# Patient Record
Sex: FEMALE | Race: WHITE | NOT HISPANIC OR LATINO | URBAN - METROPOLITAN AREA
[De-identification: names, ages, dates, MRNs, and addresses within clinical notes are randomized per-mention and may not be internally consistent; named-entity substitution may affect disease eponyms.]

---

## 2022-10-29 ENCOUNTER — INPATIENT (INPATIENT)
Facility: HOSPITAL | Age: 77
LOS: 10 days | Discharge: HOME | DRG: 439 | End: 2022-11-09
Attending: INTERNAL MEDICINE | Admitting: INTERNAL MEDICINE
Payer: MEDICARE

## 2022-10-29 VITALS
DIASTOLIC BLOOD PRESSURE: 84 MMHG | RESPIRATION RATE: 18 BRPM | HEART RATE: 79 BPM | WEIGHT: 130.95 LBS | OXYGEN SATURATION: 98 % | TEMPERATURE: 98 F | SYSTOLIC BLOOD PRESSURE: 153 MMHG

## 2022-10-29 DIAGNOSIS — D72.829 ELEVATED WHITE BLOOD CELL COUNT, UNSPECIFIED: ICD-10-CM

## 2022-10-29 DIAGNOSIS — Z29.9 ENCOUNTER FOR PROPHYLACTIC MEASURES, UNSPECIFIED: ICD-10-CM

## 2022-10-29 DIAGNOSIS — K85.90 ACUTE PANCREATITIS WITHOUT NECROSIS OR INFECTION, UNSPECIFIED: ICD-10-CM

## 2022-10-29 LAB
ALBUMIN SERPL ELPH-MCNC: 3.2 G/DL — LOW (ref 3.5–5)
ALP SERPL-CCNC: 106 U/L — SIGNIFICANT CHANGE UP (ref 40–120)
ALT FLD-CCNC: 58 U/L DA — SIGNIFICANT CHANGE UP (ref 10–60)
ANION GAP SERPL CALC-SCNC: 8 MMOL/L — SIGNIFICANT CHANGE UP (ref 5–17)
APPEARANCE UR: CLEAR — SIGNIFICANT CHANGE UP
AST SERPL-CCNC: 45 U/L — HIGH (ref 10–40)
BACTERIA # UR AUTO: NEGATIVE /HPF — SIGNIFICANT CHANGE UP
BASOPHILS # BLD AUTO: 0.02 K/UL — SIGNIFICANT CHANGE UP (ref 0–0.2)
BASOPHILS NFR BLD AUTO: 0.1 % — SIGNIFICANT CHANGE UP (ref 0–2)
BILIRUB SERPL-MCNC: 0.7 MG/DL — SIGNIFICANT CHANGE UP (ref 0.2–1.2)
BILIRUB UR-MCNC: NEGATIVE — SIGNIFICANT CHANGE UP
BUN SERPL-MCNC: 17 MG/DL — SIGNIFICANT CHANGE UP (ref 7–18)
CALCIUM SERPL-MCNC: 8.9 MG/DL — SIGNIFICANT CHANGE UP (ref 8.4–10.5)
CHLORIDE SERPL-SCNC: 108 MMOL/L — SIGNIFICANT CHANGE UP (ref 96–108)
CO2 SERPL-SCNC: 27 MMOL/L — SIGNIFICANT CHANGE UP (ref 22–31)
COLOR SPEC: ABNORMAL
CREAT SERPL-MCNC: 0.93 MG/DL — SIGNIFICANT CHANGE UP (ref 0.5–1.3)
DIFF PNL FLD: ABNORMAL
EGFR: 63 ML/MIN/1.73M2 — SIGNIFICANT CHANGE UP
EOSINOPHIL # BLD AUTO: 0 K/UL — SIGNIFICANT CHANGE UP (ref 0–0.5)
EOSINOPHIL NFR BLD AUTO: 0 % — SIGNIFICANT CHANGE UP (ref 0–6)
EPI CELLS # UR: NEGATIVE /HPF — SIGNIFICANT CHANGE UP
GLUCOSE SERPL-MCNC: 145 MG/DL — HIGH (ref 70–99)
GLUCOSE UR QL: NEGATIVE — SIGNIFICANT CHANGE UP
HCT VFR BLD CALC: 50.9 % — HIGH (ref 34.5–45)
HGB BLD-MCNC: 16.7 G/DL — HIGH (ref 11.5–15.5)
IMM GRANULOCYTES NFR BLD AUTO: 0.6 % — SIGNIFICANT CHANGE UP (ref 0–0.9)
KETONES UR-MCNC: ABNORMAL
LEUKOCYTE ESTERASE UR-ACNC: ABNORMAL
LIDOCAIN IGE QN: 5682 U/L — HIGH (ref 73–393)
LYMPHOCYTES # BLD AUTO: 0.34 K/UL — LOW (ref 1–3.3)
LYMPHOCYTES # BLD AUTO: 2.1 % — LOW (ref 13–44)
MAGNESIUM SERPL-MCNC: 2 MG/DL — SIGNIFICANT CHANGE UP (ref 1.6–2.6)
MCHC RBC-ENTMCNC: 28.4 PG — SIGNIFICANT CHANGE UP (ref 27–34)
MCHC RBC-ENTMCNC: 32.8 GM/DL — SIGNIFICANT CHANGE UP (ref 32–36)
MCV RBC AUTO: 86.4 FL — SIGNIFICANT CHANGE UP (ref 80–100)
MONOCYTES # BLD AUTO: 1.26 K/UL — HIGH (ref 0–0.9)
MONOCYTES NFR BLD AUTO: 7.8 % — SIGNIFICANT CHANGE UP (ref 2–14)
NEUTROPHILS # BLD AUTO: 14.5 K/UL — HIGH (ref 1.8–7.4)
NEUTROPHILS NFR BLD AUTO: 89.4 % — HIGH (ref 43–77)
NITRITE UR-MCNC: NEGATIVE — SIGNIFICANT CHANGE UP
NRBC # BLD: 0 /100 WBCS — SIGNIFICANT CHANGE UP (ref 0–0)
PH UR: 5 — SIGNIFICANT CHANGE UP (ref 5–8)
PLATELET # BLD AUTO: 228 K/UL — SIGNIFICANT CHANGE UP (ref 150–400)
POTASSIUM SERPL-MCNC: 3.9 MMOL/L — SIGNIFICANT CHANGE UP (ref 3.5–5.3)
POTASSIUM SERPL-SCNC: 3.9 MMOL/L — SIGNIFICANT CHANGE UP (ref 3.5–5.3)
PROT SERPL-MCNC: 7.1 G/DL — SIGNIFICANT CHANGE UP (ref 6–8.3)
PROT UR-MCNC: 100
RBC # BLD: 5.89 M/UL — HIGH (ref 3.8–5.2)
RBC # FLD: 12.5 % — SIGNIFICANT CHANGE UP (ref 10.3–14.5)
RBC CASTS # UR COMP ASSIST: ABNORMAL /HPF (ref 0–2)
SARS-COV-2 RNA SPEC QL NAA+PROBE: SIGNIFICANT CHANGE UP
SODIUM SERPL-SCNC: 143 MMOL/L — SIGNIFICANT CHANGE UP (ref 135–145)
SP GR SPEC: 1.02 — SIGNIFICANT CHANGE UP (ref 1.01–1.02)
TRIGL SERPL-MCNC: 111 MG/DL — SIGNIFICANT CHANGE UP
UROBILINOGEN FLD QL: NEGATIVE — SIGNIFICANT CHANGE UP
WBC # BLD: 16.38 K/UL — HIGH (ref 3.8–10.5)
WBC # FLD AUTO: 16.38 K/UL — HIGH (ref 3.8–10.5)
WBC UR QL: SIGNIFICANT CHANGE UP /HPF (ref 0–5)

## 2022-10-29 PROCEDURE — 74177 CT ABD & PELVIS W/CONTRAST: CPT | Mod: 26,MA

## 2022-10-29 PROCEDURE — 99285 EMERGENCY DEPT VISIT HI MDM: CPT

## 2022-10-29 RX ORDER — SODIUM CHLORIDE 9 MG/ML
1000 INJECTION, SOLUTION INTRAVENOUS
Refills: 0 | Status: DISCONTINUED | OUTPATIENT
Start: 2022-10-29 | End: 2022-10-31

## 2022-10-29 RX ORDER — ONDANSETRON 8 MG/1
4 TABLET, FILM COATED ORAL ONCE
Refills: 0 | Status: COMPLETED | OUTPATIENT
Start: 2022-10-29 | End: 2022-10-29

## 2022-10-29 RX ORDER — KETOROLAC TROMETHAMINE 30 MG/ML
15 SYRINGE (ML) INJECTION ONCE
Refills: 0 | Status: DISCONTINUED | OUTPATIENT
Start: 2022-10-29 | End: 2022-10-29

## 2022-10-29 RX ORDER — ACETAMINOPHEN 500 MG
650 TABLET ORAL EVERY 6 HOURS
Refills: 0 | Status: DISCONTINUED | OUTPATIENT
Start: 2022-10-29 | End: 2022-11-09

## 2022-10-29 RX ORDER — ONDANSETRON 8 MG/1
4 TABLET, FILM COATED ORAL EVERY 8 HOURS
Refills: 0 | Status: DISCONTINUED | OUTPATIENT
Start: 2022-10-29 | End: 2022-11-09

## 2022-10-29 RX ORDER — SODIUM CHLORIDE 9 MG/ML
2000 INJECTION INTRAMUSCULAR; INTRAVENOUS; SUBCUTANEOUS ONCE
Refills: 0 | Status: COMPLETED | OUTPATIENT
Start: 2022-10-29 | End: 2022-10-29

## 2022-10-29 RX ORDER — MORPHINE SULFATE 50 MG/1
1 CAPSULE, EXTENDED RELEASE ORAL EVERY 6 HOURS
Refills: 0 | Status: DISCONTINUED | OUTPATIENT
Start: 2022-10-29 | End: 2022-11-01

## 2022-10-29 RX ORDER — MORPHINE SULFATE 50 MG/1
4 CAPSULE, EXTENDED RELEASE ORAL ONCE
Refills: 0 | Status: DISCONTINUED | OUTPATIENT
Start: 2022-10-29 | End: 2022-10-29

## 2022-10-29 RX ORDER — SODIUM CHLORIDE 9 MG/ML
1000 INJECTION, SOLUTION INTRAVENOUS
Refills: 0 | Status: DISCONTINUED | OUTPATIENT
Start: 2022-10-29 | End: 2022-10-29

## 2022-10-29 RX ORDER — MORPHINE SULFATE 50 MG/1
2 CAPSULE, EXTENDED RELEASE ORAL EVERY 6 HOURS
Refills: 0 | Status: DISCONTINUED | OUTPATIENT
Start: 2022-10-29 | End: 2022-11-02

## 2022-10-29 RX ORDER — ENOXAPARIN SODIUM 100 MG/ML
40 INJECTION SUBCUTANEOUS EVERY 24 HOURS
Refills: 0 | Status: DISCONTINUED | OUTPATIENT
Start: 2022-10-29 | End: 2022-11-03

## 2022-10-29 RX ORDER — KETOROLAC TROMETHAMINE 30 MG/ML
15 SYRINGE (ML) INJECTION EVERY 6 HOURS
Refills: 0 | Status: DISCONTINUED | OUTPATIENT
Start: 2022-10-29 | End: 2022-10-29

## 2022-10-29 RX ADMIN — Medication 15 MILLIGRAM(S): at 11:34

## 2022-10-29 RX ADMIN — Medication 15 MILLIGRAM(S): at 12:00

## 2022-10-29 RX ADMIN — MORPHINE SULFATE 4 MILLIGRAM(S): 50 CAPSULE, EXTENDED RELEASE ORAL at 14:20

## 2022-10-29 RX ADMIN — SODIUM CHLORIDE 1000 MILLILITER(S): 9 INJECTION INTRAMUSCULAR; INTRAVENOUS; SUBCUTANEOUS at 19:30

## 2022-10-29 RX ADMIN — ONDANSETRON 4 MILLIGRAM(S): 8 TABLET, FILM COATED ORAL at 11:34

## 2022-10-29 RX ADMIN — MORPHINE SULFATE 4 MILLIGRAM(S): 50 CAPSULE, EXTENDED RELEASE ORAL at 14:50

## 2022-10-29 NOTE — ED PROVIDER NOTE - PHYSICAL EXAMINATION
General: well appearing female, no acute distress   HEENT: normocephalic, atraumatic   Respiratory: normal work of breathing, lungs clear to auscultation bilaterally   Cardiac: regular rate and rhythm   Abdomen: soft, diffuse lower abdominal tenderness to palpation   MSK: no swelling or tenderness of lower extremities, moving all extremities spontaneously   Skin: warm, dry   Neuro: A&Ox3  Psych: appropriate affect

## 2022-10-29 NOTE — H&P ADULT - HISTORY OF PRESENT ILLNESS
77F, coming from home, ambulates independently, with no significant PMHx p/w worsening abdominal pain x3d. She states her abdominal pain started on Thursday, after taking new abx, amoxicillin  started on Monday after a recent dental removal. She describes abdominal pain to radiate to her back with sudden nausea and vomiting waking her up on Friday morning. She states that she has not been able to hold any food/ drink down, reporting food aversion since then. Denies any prior episodes of this happening. She states that last year she was told that she was told that she had hardening of the gallbladder with no stones, she does not follow GI o/p. Patient denies fevers, chills, or recent illness. Patient denies HA, chest pain, SOB, abdominal pain, and changes in BM and urination.

## 2022-10-29 NOTE — ED ADULT NURSE NOTE - OBJECTIVE STATEMENT
patient c/o abdominal pain with nausea vomiting since Friday morning. reports dental procedure on Monday taking amoxicillin.

## 2022-10-29 NOTE — ED PROVIDER NOTE - OBJECTIVE STATEMENT
77F presenting with two days of abdominal pain. patient currently taking amoxicillin for recent dental procedure. has nausea and vomiting. no fever, chest pain, trouble breathing, diarrhea. no abdominal surgeries.

## 2022-10-29 NOTE — H&P ADULT - NSHPPHYSICALEXAM_GEN_ALL_CORE
Vital Signs Last 24 Hrs  T(C): 36.7 (29 Oct 2022 15:10), Max: 36.7 (29 Oct 2022 15:10)  T(F): 98.1 (29 Oct 2022 15:10), Max: 98.1 (29 Oct 2022 15:10)  HR: 72 (29 Oct 2022 15:10) (72 - 80)  BP: 149/82 (29 Oct 2022 15:10) (149/82 - 172/84)  BP(mean): --  RR: 17 (29 Oct 2022 15:10) (17 - 18)  SpO2: 93% (29 Oct 2022 15:10) (93% - 98%)    Parameters below as of 29 Oct 2022 15:10  Patient On (Oxygen Delivery Method): room air    GENERAL: NAD, lying in bed comfortably  HEAD:  Atraumatic, Normocephalic  EYES: EOMI, PERRLA, conjunctiva and sclera clear  ENT: Moist mucous membranes  NECK: Supple, No JVD  CHEST/LUNG: Clear to auscultation bilaterally; No rales, rhonchi, wheezing, or rubs. Unlabored respirations  HEART: Regular rate and rhythm; No murmurs, rubs, or gallops  ABDOMEN: Bowel sounds present; Soft, Nondistended. (+) epigastric tenderness   EXTREMITIES:  2+ Peripheral Pulses, brisk capillary refill. No clubbing, cyanosis, or edema  NERVOUS SYSTEM:  Alert & Oriented X3, speech clear. No deficits   MSK: FROM all 4 extremities, full and equal strength  SKIN: No rashes or lesions

## 2022-10-29 NOTE — H&P ADULT - PROBLEM SELECTOR PLAN 2
p/w WBC >16k, afebrile   likely reactive in the setting of acute pancreatitis   will hold off on Abx for now   Rest of plan as above   will continue to monitor for improvement

## 2022-10-29 NOTE — H&P ADULT - PROBLEM SELECTOR PLAN 1
p/w abdominal pain + lipase elevated 5682   likely in setting of new Abx use s/p dental procedure on Monday   CT a/p shows distended GB with evidence of acute pancreatitis, colonic diverticulosis, mild-moderate pelvic ascites, and no evidence of cholelithiasis  s/p Ketoralac, Morphine, and 2L NS in the ED  c/w aggressive fluid hydration - LR 200cc/hr x 24h  NPO until able to tolerate diet, once able to tolerate diet start with clears   Zofran 4mg ivp q4 PRN for n/v  Pain management - tylenol, Ketoralac, morphine  f/u Abdominal U/S  GI consulted: p/w abdominal pain + lipase elevated 5682   likely in setting of new Abx use s/p dental procedure on Monday   CT a/p shows distended GB with evidence of acute pancreatitis, colonic diverticulosis, mild-moderate pelvic ascites, and no evidence of cholelithiasis  s/p Ketoralac, Morphine, and 2L NS in the ED  c/w IVF with 1/2 NS @ 100cc/hr x 24hr, per attending   NPO until able to tolerate diet, once able to tolerate diet start with clears   Zofran 4mg ivp q4 PRN for n/v  Pain management - morphine 1mg IVP for breakthrough pain and morphine 2mg for severe pain   f/u Abdominal U/S  f/u GGT, lactate, CEA and pancreatic tumor marker   f/u labs tomorrow 3AM  GI consulted: Dr. White, called twice with no answer, still awaiting reply - endorsed for covering team to f/u

## 2022-10-29 NOTE — ED PROVIDER NOTE - NSFOLLOWUPINSTRUCTIONS_ED_ALL_ED_FT
You were seen in the emergency department for abdominal pain.     Please follow-up with your primary care doctor in the next 24-48 hours.     If you have any worsening symptoms, severe headache, chest pain, trouble breathing, please return to the emergency department.

## 2022-10-29 NOTE — H&P ADULT - ASSESSMENT
77F, coming from home, ambulates independently, with no significant PMHx p/w worsening abdominal pain x3d with associated n/v and food aversion. CT confirms pancreatitis. Patient being admitted for acute pancreatitis.     In the ED:  VSS  WBC >16k, Hgb >16k, TG wnl, AST 45, and Lipase 5682  CT a/p shows distended GB with evidence of acute pancreatitis, colonic diverticulosis, mild-moderate pelvic ascites, and no evidence of cholelithiasis  s/p Ketoralac and Morphine   s/p 2L NS  77F, coming from home, ambulates independently, with no significant PMHx p/w worsening abdominal pain x3d with associated n/v and food aversion. CT confirms pancreatitis. Patient being admitted for acute pancreatitis.     In the ED:  VSS  WBC >16k, Hgb >16k, TG wnl, AST 45, and Lipase 5682  CT a/p shows distended GB with evidence of acute pancreatitis, colonic diverticulosis, mild-moderate pelvic ascites, and no evidence of cholelithiasis  s/p Ketoralac and Morphine   s/p 2L NS       PRIMARY TEAM TO FOLLOW UP WITH DR. RAGLAND IN THE AM, CALLED TWICE FOR CONSULT AND FURTHER RECOMMENDATIONS WITH NO REPLY.

## 2022-10-30 LAB
ALBUMIN SERPL ELPH-MCNC: 2.6 G/DL — LOW (ref 3.5–5)
ALP SERPL-CCNC: 82 U/L — SIGNIFICANT CHANGE UP (ref 40–120)
ALT FLD-CCNC: 39 U/L DA — SIGNIFICANT CHANGE UP (ref 10–60)
ANION GAP SERPL CALC-SCNC: 5 MMOL/L — SIGNIFICANT CHANGE UP (ref 5–17)
AST SERPL-CCNC: 25 U/L — SIGNIFICANT CHANGE UP (ref 10–40)
BASOPHILS # BLD AUTO: 0.03 K/UL — SIGNIFICANT CHANGE UP (ref 0–0.2)
BASOPHILS NFR BLD AUTO: 0.2 % — SIGNIFICANT CHANGE UP (ref 0–2)
BILIRUB SERPL-MCNC: 0.8 MG/DL — SIGNIFICANT CHANGE UP (ref 0.2–1.2)
BUN SERPL-MCNC: 19 MG/DL — HIGH (ref 7–18)
CALCIUM SERPL-MCNC: 8.1 MG/DL — LOW (ref 8.4–10.5)
CANCER AG19-9 SERPL-ACNC: 17 U/ML — SIGNIFICANT CHANGE UP
CEA SERPL-MCNC: 1.7 NG/ML — SIGNIFICANT CHANGE UP (ref 0–3.8)
CHLORIDE SERPL-SCNC: 111 MMOL/L — HIGH (ref 96–108)
CO2 SERPL-SCNC: 27 MMOL/L — SIGNIFICANT CHANGE UP (ref 22–31)
CREAT SERPL-MCNC: 0.65 MG/DL — SIGNIFICANT CHANGE UP (ref 0.5–1.3)
EGFR: 90 ML/MIN/1.73M2 — SIGNIFICANT CHANGE UP
EOSINOPHIL # BLD AUTO: 0 K/UL — SIGNIFICANT CHANGE UP (ref 0–0.5)
EOSINOPHIL NFR BLD AUTO: 0 % — SIGNIFICANT CHANGE UP (ref 0–6)
GGT SERPL-CCNC: 75 U/L — HIGH (ref 8–40)
GLUCOSE SERPL-MCNC: 117 MG/DL — HIGH (ref 70–99)
HCT VFR BLD CALC: 44.3 % — SIGNIFICANT CHANGE UP (ref 34.5–45)
HGB BLD-MCNC: 14.7 G/DL — SIGNIFICANT CHANGE UP (ref 11.5–15.5)
IMM GRANULOCYTES NFR BLD AUTO: 1 % — HIGH (ref 0–0.9)
LACTATE SERPL-SCNC: 0.8 MMOL/L — SIGNIFICANT CHANGE UP (ref 0.7–2)
LYMPHOCYTES # BLD AUTO: 0.54 K/UL — LOW (ref 1–3.3)
LYMPHOCYTES # BLD AUTO: 3.4 % — LOW (ref 13–44)
MAGNESIUM SERPL-MCNC: 1.9 MG/DL — SIGNIFICANT CHANGE UP (ref 1.6–2.6)
MCHC RBC-ENTMCNC: 29.2 PG — SIGNIFICANT CHANGE UP (ref 27–34)
MCHC RBC-ENTMCNC: 33.2 GM/DL — SIGNIFICANT CHANGE UP (ref 32–36)
MCV RBC AUTO: 88.1 FL — SIGNIFICANT CHANGE UP (ref 80–100)
MONOCYTES # BLD AUTO: 1.2 K/UL — HIGH (ref 0–0.9)
MONOCYTES NFR BLD AUTO: 7.5 % — SIGNIFICANT CHANGE UP (ref 2–14)
NEUTROPHILS # BLD AUTO: 14.14 K/UL — HIGH (ref 1.8–7.4)
NEUTROPHILS NFR BLD AUTO: 87.9 % — HIGH (ref 43–77)
NRBC # BLD: 0 /100 WBCS — SIGNIFICANT CHANGE UP (ref 0–0)
PHOSPHATE SERPL-MCNC: 2 MG/DL — LOW (ref 2.5–4.5)
PLATELET # BLD AUTO: 184 K/UL — SIGNIFICANT CHANGE UP (ref 150–400)
POTASSIUM SERPL-MCNC: 3.3 MMOL/L — LOW (ref 3.5–5.3)
POTASSIUM SERPL-SCNC: 3.3 MMOL/L — LOW (ref 3.5–5.3)
PROT SERPL-MCNC: 5.9 G/DL — LOW (ref 6–8.3)
RBC # BLD: 5.03 M/UL — SIGNIFICANT CHANGE UP (ref 3.8–5.2)
RBC # FLD: 12.7 % — SIGNIFICANT CHANGE UP (ref 10.3–14.5)
SODIUM SERPL-SCNC: 143 MMOL/L — SIGNIFICANT CHANGE UP (ref 135–145)
WBC # BLD: 16.07 K/UL — HIGH (ref 3.8–10.5)
WBC # FLD AUTO: 16.07 K/UL — HIGH (ref 3.8–10.5)

## 2022-10-30 RX ORDER — POTASSIUM CHLORIDE 20 MEQ
40 PACKET (EA) ORAL ONCE
Refills: 0 | Status: COMPLETED | OUTPATIENT
Start: 2022-10-30 | End: 2022-10-30

## 2022-10-30 RX ADMIN — MORPHINE SULFATE 2 MILLIGRAM(S): 50 CAPSULE, EXTENDED RELEASE ORAL at 23:56

## 2022-10-30 RX ADMIN — SODIUM CHLORIDE 100 MILLILITER(S): 9 INJECTION, SOLUTION INTRAVENOUS at 05:45

## 2022-10-30 RX ADMIN — Medication 40 MILLIEQUIVALENT(S): at 15:06

## 2022-10-30 RX ADMIN — MORPHINE SULFATE 2 MILLIGRAM(S): 50 CAPSULE, EXTENDED RELEASE ORAL at 17:44

## 2022-10-30 RX ADMIN — MORPHINE SULFATE 2 MILLIGRAM(S): 50 CAPSULE, EXTENDED RELEASE ORAL at 05:45

## 2022-10-30 RX ADMIN — ENOXAPARIN SODIUM 40 MILLIGRAM(S): 100 INJECTION SUBCUTANEOUS at 05:44

## 2022-10-30 RX ADMIN — MORPHINE SULFATE 2 MILLIGRAM(S): 50 CAPSULE, EXTENDED RELEASE ORAL at 17:14

## 2022-10-30 NOTE — CONSULT NOTE ADULT - SUBJECTIVE AND OBJECTIVE BOX
[  ] STAT REQUEST              [ X ] ROUTINE REQUEST    Patient is a 77 year old female with abdominal pain. GI consulted to evaluate.        HPI:  77 year old female from home, ambulates independently, with out significant past medical history presented with 3 days history of progressively worsening sharp constant, 8/10 intensity epigastric abdominal pain radiating to her back associated with nausea, non bloody vomiting. Patient reports abdominal pain started after starting taking Amoxacillin for her recent dental removal. Patient denies hematemesis, hematochezia, melena, fever, chills, chest pain, SOB, cough, hematuria, dysuria, diarrhea or taking ETOH.           PAIN MANAGEMENT:  Pain Scale:                8 /10  Pain Location:  Epigastric abdominal pain      Prior Colonoscopy:  No prior colonoscopy      PAST MEDICAL HISTORY  No significant past medical history reported      PAST SURGICAL HISTORY  No significant surgical history reported      Allergies    No Known Allergies    Intolerances  None         MEDICATIONS  (STANDING):  enoxaparin Injectable 40 milliGRAM(s) SubCutaneous every 24 hours  potassium chloride    Tablet ER 40 milliEquivalent(s) Oral once  sodium chloride 0.45%. 1000 milliLiter(s) (100 mL/Hr) IV Continuous <Continuous>    MEDICATIONS  (PRN):  acetaminophen     Tablet .. 650 milliGRAM(s) Oral every 6 hours PRN Temp greater or equal to 38C (100.4F), Mild Pain (1 - 3)  aluminum hydroxide/magnesium hydroxide/simethicone Suspension 30 milliLiter(s) Oral every 4 hours PRN Dyspepsia  morphine  - Injectable 2 milliGRAM(s) IV Push every 6 hours PRN Severe Pain (7 - 10)  morphine  - Injectable 1 milliGRAM(s) IV Push every 6 hours PRN breakthrough pain  ondansetron Injectable 4 milliGRAM(s) IV Push every 8 hours PRN Nausea and/or Vomiting      SOCIAL HISTORY  Advanced Directives:       [ X ] Full Code       [  ] DNR  Marital Status:         [  ] M      [ X ] S      [  ] D       [  ] W  Children:       [ X ] Yes      [  ] No  Occupation:        [  ] Employed       [ X] Unemployed       [  ] Retired  Diet:       [ X ] Regular       [  ] PEG feeding          [  ] NG tube feeding  Drug Use:           [ X ] Patient denied          [  ] Yes  Alcohol:           [  X] No             [  ] Yes (socially)         [  ] Yes (chronic)  Tobacco:           [  ] Yes           [  X] No      FAMILY HISTORY  [ X ] Heart Disease            [ X ] Diabetes             [ X ] HTN             [  ] Colon Cancer             [  ] Stomach Cancer              [  ] Pancreatic Cancer      VITAL SIGNS   Vital Signs Last 24 Hrs  T(C): 37.1 (30 Oct 2022 07:40), Max: 37.1 (30 Oct 2022 07:40)  T(F): 98.7 (30 Oct 2022 07:40), Max: 98.7 (30 Oct 2022 07:40)  HR: 84 (30 Oct 2022 07:40) (72 - 85)  BP: 167/74 (30 Oct 2022 07:40) (143/78 - 167/74)   RR: 18 (30 Oct 2022 07:40) (17 - 18)  SpO2: 92% (30 Oct 2022 07:40) (92% - 93%)  Parameters below as of 30 Oct 2022 07:40  Patient On (Oxygen Delivery Method): room air           CBC Full  -  ( 30 Oct 2022 03:20 )  WBC Count : 16.07 K/uL  RBC Count : 5.03 M/uL  Hemoglobin : 14.7 g/dL  Hematocrit : 44.3 %  Platelet Count - Automated : 184 K/uL  Mean Cell Volume : 88.1 fl  Mean Cell Hemoglobin : 29.2 pg  Mean Cell Hemoglobin Concentration : 33.2 gm/dL  Auto Neutrophil # : 14.14 K/uL  Auto Lymphocyte # : 0.54 K/uL  Auto Monocyte # : 1.20 K/uL  Auto Eosinophil # : 0.00 K/uL  Auto Basophil # : 0.03 K/uL  Auto Neutrophil % : 87.9 %  Auto Lymphocyte % : 3.4 %  Auto Monocyte % : 7.5 %  Auto Eosinophil % : 0.0 %  Auto Basophil % : 0.2 %      10-30    143  |  111<H>  |  19<H>  ----------------------------<  117<H>  3.3<L>   |  27  |  0.65    Ca    8.1<L>      30 Oct 2022 03:20  Phos  2.0     10-30  Mg     1.9     10-30    TPro  5.9<L>  /  Alb  2.6<L>  /  TBili  0.8  /  DBili  x   /  AST  25  /  ALT  39  /  AlkPhos  82  10-30     Urinalysis + Microscopic Examination (10.29.22 @ 14:40)   pH Urine: 5.0   Leukocyte Esterase Concentration: Trace   Color: Raina   Protein, Urine: 100   Urine Appearance: Clear   Urobilinogen: Negative   Specific Gravity: 1.025   Nitrite: Negative   Ketone - Urine: Small   Bilirubin: Negative   Glucose Qualitative, Urine: Negative   Blood, Urine: Moderate   Red Blood Cell - Urine: 5-10 /HPF   White Blood Cell - Urine: 0-2 /HPF   Epithelial Cells: Negative /HPF   Bacteria: Negative /HPF     RADIOLOGY/IMAGING                ACC: [id number] EXAM:  CT ABDOMEN AND PELVIS IC                          PROCEDURE DATE:  10/29/2022          INTERPRETATION:  CLINICAL INFORMATION: Lower abdominal pain    COMPARISON: None.    CONTRAST/COMPLICATIONS:  IV Contrast: Omnipaque 350  90 cc administered   10 cc discarded  Oral Contrast: NONE  Complications: None reported at time of study completion    PROCEDURE:  CT of the Abdomen and Pelvis was performed.  Sagittal and coronal reformats were performed.    FINDINGS:  LOWER CHEST: Chronic changes in the lung bases with additional linear   atelectasis and/or scarring.    LIVER: Hepatic cyst adjacent to the gallbladder fossa. Trace perihepatic   ascites.  BILE DUCTS: Common bile duct measures up to 8 mm. No definite common bile   duct calculus delineated on current study, correlate with bilirubin   levels.  GALLBLADDER: Gallbladder is mildly distended.  SPLEEN: Within normal limits.  PANCREAS: Edema the pancreatic body and tail with focal area of marked   decreased enhancement ofthe proximal pancreatic body with peripancreatic   infiltrative changes and peripancreatic fluid extending inferiorly along   the bilateral paracolic gutters compatible with acute pancreatitis.  ADRENALS: Within normal limits.  KIDNEYS/URETERS: No hydronephrosis bilaterally. Anterior exophytic right   mid to lower pole cyst. Additional tiny hypodensities bilaterally to   small to characterize. No intrarenal calculi. The ureters are   unremarkable.    BLADDER: Within normal limits.  REPRODUCTIVE ORGANS: Mild to moderate pelvic ascites. Uterus and adnexa   are unremarkable.    BOWEL: No bowel obstruction. Appendix is normal. Colonic diverticulosis   without definite evidence of acute diverticulitis.  PERITONEUM: No ascites.  VESSELS: Atherosclerotic changes.  RETROPERITONEUM/LYMPH NODES: No lymphadenopathy.  ABDOMINAL WALL: Tiny fat-containing umbilical hernia.  BONES: Degenerative changes.    IMPRESSION:  Acute pancreatitis with focal area of decreased enhancement in the   proximal anterior pancreatic body.

## 2022-10-30 NOTE — CONSULT NOTE ADULT - SUBJECTIVE AND OBJECTIVE BOX
Patient is a 77y old  Female from home, ambulates independently, with no significant PMHx, presents to the ER for evaluation of worsening abdominal pain x 3d. She states her abdominal pain started on Thursday, after taking new abx, amoxicillin  started on Monday after a recent dental removal. She describes abdominal pain to radiate to her back with sudden nausea and vomiting waking her up on Friday morning. She states that she has not been able to hold any food/ drink down. ON Admission, she found to have no fever but Leukocytosis. The CT abd/pelvis shows Acute Pancreatitis. The ID consult requested to assist with further evaluation and antibiotic management.         REVIEW OF SYSTEMS: Total of twelve systems have been reviewed with patient and found to be negative unless mentioned in HPI        PAST MEDICAL & SURGICAL HISTORY:  No pertinent past medical history        SOCIAL HISTORY  Alcohol: Does not drink  Tobacco: Does not smoke  Illicit substance use: None      FAMILY HISTORY: Non contributory to the present illness        ALLERGIES: No Known Allergies        \Vital Signs Last 24 Hrs  T(C): 37.1 (30 Oct 2022 07:40), Max: 37.1 (30 Oct 2022 07:40)  T(F): 98.7 (30 Oct 2022 07:40), Max: 98.7 (30 Oct 2022 07:40)  HR: 84 (30 Oct 2022 07:40) (84 - 85)  BP: 167/74 (30 Oct 2022 07:40) (143/78 - 167/74)  BP(mean): --  RR: 18 (30 Oct 2022 07:40) (17 - 18)  SpO2: 92% (30 Oct 2022 07:40) (92% - 93%)    Parameters below as of 30 Oct 2022 07:40  Patient On (Oxygen Delivery Method): room air        PHYSICAL EXAM:  GENERAL: Not in distress   CHEST/LUNG: Not using accessory muscles   HEART: s1 and s2 present  ABDOMEN:  Nontender and  Nondistended  EXTREMITIES: No pedal  edema  CNS: Awake and Alert      LABS:                        14.7   16.07 )-----------( 184      ( 30 Oct 2022 03:20 )             44.3       10    143  |  111<H>  |  19<H>  ----------------------------<  117<H>  3.3<L>   |  27  |  0.65    Ca    8.1<L>      30 Oct 2022 03:20  Phos  2.0     10-30  Mg     1.9     10-30    TPro  5.9<L>  /  Alb  2.6<L>  /  TBili  0.8  /  DBili  x   /  AST  25  /  ALT  39  /  AlkPhos  82  10-30        Urinalysis Basic - ( 29 Oct 2022 14:40 )  Color: Raina / Appearance: Clear / S.025 / pH: x  Gluc: x / Ketone: Small  / Bili: Negative / Urobili: Negative   Blood: x / Protein: 100 / Nitrite: Negative   Leuk Esterase: Trace / RBC: 5-10 /HPF / WBC 0-2 /HPF   Sq Epi: x / Non Sq Epi: Negative /HPF / Bacteria: Negative /HPF        MEDICATIONS  (STANDING):  enoxaparin Injectable 40 milliGRAM(s) SubCutaneous every 24 hours  sodium chloride 0.45%. 1000 milliLiter(s) (100 mL/Hr) IV Continuous <Continuous>    MEDICATIONS  (PRN):  acetaminophen     Tablet .. 650 milliGRAM(s) Oral every 6 hours PRN Temp greater or equal to 38C (100.4F), Mild Pain (1 - 3)  aluminum hydroxide/magnesium hydroxide/simethicone Suspension 30 milliLiter(s) Oral every 4 hours PRN Dyspepsia  morphine  - Injectable 2 milliGRAM(s) IV Push every 6 hours PRN Severe Pain (7 - 10)  morphine  - Injectable 1 milliGRAM(s) IV Push every 6 hours PRN breakthrough pain  ondansetron Injectable 4 milliGRAM(s) IV Push every 8 hours PRN Nausea and/or Vomiting        RADIOLOGY & ADDITIONAL TESTS:    10/29/22: CT Abdomen and Pelvis w/ IV Cont (10.29.22 @ 13:37) >Acute pancreatitis with focal area of decreased enhancement in the   proximal anterior pancreatic body.      MICROBIOLOGY DATA:    COVID-19 PCR . (10.29.22 @ 14:40)   COVID-19 PCR: NotDetec:               Patient is a 77y old  Female from home, ambulates independently, with no significant PMHx, presents to the ER for evaluation of worsening abdominal pain x 3d. She states her abdominal pain started on Thursday, after taking new abx, amoxicillin  started on Monday after a recent dental removal. She describes abdominal pain to radiate to her back with sudden nausea and vomiting waking her up on Friday morning. She states that she has not been able to hold any food/ drink down. ON Admission, she found to have no fever but Leukocytosis. The CT abd/pelvis shows Acute Pancreatitis. The ID consult requested to assist with further evaluation and antibiotic management.       REVIEW OF SYSTEMS: Total of twelve systems have been reviewed with patient and found to be negative unless mentioned in HPI        PAST MEDICAL & SURGICAL HISTORY:  No pertinent past medical history        SOCIAL HISTORY  Alcohol: Does not drink  Tobacco: Does not smoke  Illicit substance use: None      FAMILY HISTORY: Non contributory to the present illness        ALLERGIES: No Known Allergies        \Vital Signs Last 24 Hrs  T(C): 37.1 (30 Oct 2022 07:40), Max: 37.1 (30 Oct 2022 07:40)  T(F): 98.7 (30 Oct 2022 07:40), Max: 98.7 (30 Oct 2022 07:40)  HR: 84 (30 Oct 2022 07:40) (84 - 85)  BP: 167/74 (30 Oct 2022 07:40) (143/78 - 167/74)  BP(mean): --  RR: 18 (30 Oct 2022 07:40) (17 - 18)  SpO2: 92% (30 Oct 2022 07:40) (92% - 93%)    Parameters below as of 30 Oct 2022 07:40  Patient On (Oxygen Delivery Method): room air        PHYSICAL EXAM:  GENERAL: Not in distress   CHEST/LUNG: Not using accessory muscles   HEART: s1 and s2 present  ABDOMEN:  epigastric and right sided tenderness   EXTREMITIES: No pedal  edema  CNS: Awake and Alert      LABS:                        14.7   16.07 )-----------( 184      ( 30 Oct 2022 03:20 )             44.3       10    143  |  111<H>  |  19<H>  ----------------------------<  117<H>  3.3<L>   |  27  |  0.65    Ca    8.1<L>      30 Oct 2022 03:20  Phos  2.0     10-30  Mg     1.9     10-30    TPro  5.9<L>  /  Alb  2.6<L>  /  TBili  0.8  /  DBili  x   /  AST  25  /  ALT  39  /  AlkPhos  82  10-30        Urinalysis Basic - ( 29 Oct 2022 14:40 )  Color: Raina / Appearance: Clear / S.025 / pH: x  Gluc: x / Ketone: Small  / Bili: Negative / Urobili: Negative   Blood: x / Protein: 100 / Nitrite: Negative   Leuk Esterase: Trace / RBC: 5-10 /HPF / WBC 0-2 /HPF   Sq Epi: x / Non Sq Epi: Negative /HPF / Bacteria: Negative /HPF        MEDICATIONS  (STANDING):  enoxaparin Injectable 40 milliGRAM(s) SubCutaneous every 24 hours  sodium chloride 0.45%. 1000 milliLiter(s) (100 mL/Hr) IV Continuous <Continuous>    MEDICATIONS  (PRN):  acetaminophen     Tablet .. 650 milliGRAM(s) Oral every 6 hours PRN Temp greater or equal to 38C (100.4F), Mild Pain (1 - 3)  aluminum hydroxide/magnesium hydroxide/simethicone Suspension 30 milliLiter(s) Oral every 4 hours PRN Dyspepsia  morphine  - Injectable 2 milliGRAM(s) IV Push every 6 hours PRN Severe Pain (7 - 10)  morphine  - Injectable 1 milliGRAM(s) IV Push every 6 hours PRN breakthrough pain  ondansetron Injectable 4 milliGRAM(s) IV Push every 8 hours PRN Nausea and/or Vomiting        RADIOLOGY & ADDITIONAL TESTS:      10/29/22: CT Abdomen and Pelvis w/ IV Cont (10.29.22 @ 13:37) >Acute pancreatitis with focal area of decreased enhancement in the   proximal anterior pancreatic body.      MICROBIOLOGY DATA:    COVID-19 PCR . (10.29.22 @ 14:40)   COVID-19 PCR: NotDetec:

## 2022-10-30 NOTE — PATIENT PROFILE ADULT - FALL HARM RISK - FACTORS
pain/IV and/or equipment tethered to patient/Other medical problems/Other pain/IV and/or equipment tethered to patient/Other medical problems/Syncope/Other

## 2022-10-30 NOTE — PROGRESS NOTE ADULT - ASSESSMENT
77F, coming from home, ambulates independently, with no significant PMHx p/w worsening abdominal pain x3d with associated n/v and food aversion. CT confirms pancreatitis. Patient being admitted for acute pancreatitis.     In

## 2022-10-30 NOTE — CONSULT NOTE ADULT - GASTROINTESTINAL
soft/nondistended/normal active bowel sounds/no guarding/no rigidity/no organomegaly/no palpable anibal/no masses palpable/tender details…

## 2022-10-30 NOTE — CONSULT NOTE ADULT - NEGATIVE SKIN SYMPTOMS
Impression: Keratoconjunctivitis sicca, bilateral
01/2021 OSMO [id number]
03/2021 [facility]smo 325, [phone]
03/2021 Schirmers 7, 5 Plan: Improving. Pt notes redness at nasal canthus OU is better. 4th IPL 8/2021. Using Systane Ultra AT's qid OU- recommend switching to PFATs. O3's. Avoid ceiling fans. Cont Restasis BID OU-still notes burning, consider refrigeration and use AT's after. Consider Lumify PRN OU. Will consider replacing plugs BLL in future. Generated new SRX Today. *Removed plugs 9/2021 to see if improves redness. 03/4/2021 BLL 0.5mm OASIS Mini placed 8/2021; 0.4mm Ultra placed RLL

DEC 03/2021. 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 Discussed options for treatment such as IPL x 4 in order to maintain MG function. Pt aware of out of pocket cost $350. no rash/no itching/no dryness/no brittle nails/no pitted nails/no hair loss

## 2022-10-30 NOTE — CONSULT NOTE ADULT - NEGATIVE OPHTHALMOLOGIC SYMPTOMS
no diplopia/no photophobia/no lacrimation L/no lacrimation R/no blurred vision L/no blurred vision R/no discharge L/no discharge R/no pain L/no pain R/no irritation L/no irritation R/no loss of vision L/no loss of vision R/no scleral injection L/no scleral injection R HEADACHE

## 2022-10-30 NOTE — CONSULT NOTE ADULT - NEGATIVE NEUROLOGICAL SYMPTOMS
no syncope/no tremors/no loss of sensation/no difficulty walking/no headache/no loss of consciousness/no confusion

## 2022-10-30 NOTE — PROGRESS NOTE ADULT - SUBJECTIVE AND OBJECTIVE BOX
SUBJECTIVE / OVERNIGHT EVENTS:pt seen and examined  10-30-22    MEDICATIONS  (STANDING):  enoxaparin Injectable 40 milliGRAM(s) SubCutaneous every 24 hours  sodium chloride 0.45%. 1000 milliLiter(s) (100 mL/Hr) IV Continuous <Continuous>    MEDICATIONS  (PRN):  acetaminophen     Tablet .. 650 milliGRAM(s) Oral every 6 hours PRN Temp greater or equal to 38C (100.4F), Mild Pain (1 - 3)  aluminum hydroxide/magnesium hydroxide/simethicone Suspension 30 milliLiter(s) Oral every 4 hours PRN Dyspepsia  morphine  - Injectable 2 milliGRAM(s) IV Push every 6 hours PRN Severe Pain (7 - 10)  morphine  - Injectable 1 milliGRAM(s) IV Push every 6 hours PRN breakthrough pain  ondansetron Injectable 4 milliGRAM(s) IV Push every 8 hours PRN Nausea and/or Vomiting    T(C): 36.7 (10-30-22 @ 21:48), Max: 37.2 (10-30-22 @ 15:58)  HR: 83 (10-30-22 @ 21:48) (81 - 85)  BP: 160/77 (10-30-22 @ 21:48) (143/78 - 167/74)  RR: 17 (10-30-22 @ 21:48) (17 - 18)  SpO2: 92% (10-30-22 @ 21:48) (92% - 95%)    CAPILLARY BLOOD GLUCOSE        I&O's Summary      Constitutional: No fever, fatigue  Skin: No rash.  Eyes: No recent vision problems or eye pain.  ENT: No congestion, ear pain, or sore throat.  Cardiovascular: No chest pain or palpation.  Respiratory: No cough, shortness of breath, congestion, or wheezing.  Gastrointestinal: No abdominal pain, nausea, vomiting, or diarrhea.  Genitourinary: No dysuria.  Musculoskeletal: No joint swelling.  Neurologic: No headache.    PHYSICAL EXAM:  GENERAL: NAD  EYES: EOMI, PERRLA  NECK: Supple, No JVD  CHEST/LUNG: dec breath sounds at bases  HEART:  S1 , S2 +  ABDOMEN: soft, bs+  EXTREMITIES:  no edema  NEUROLOGY:alert awake      LABS:                        14.7   16.07 )-----------( 184      ( 30 Oct 2022 03:20 )             44.3     10-30    143  |  111<H>  |  19<H>  ----------------------------<  117<H>  3.3<L>   |  27  |  0.65    Ca    8.1<L>      30 Oct 2022 03:20  Phos  2.0     10-30  Mg     1.9     10-30    TPro  5.9<L>  /  Alb  2.6<L>  /  TBili  0.8  /  DBili  x   /  AST  25  /  ALT  39  /  AlkPhos  82  10-30          Urinalysis Basic - ( 29 Oct 2022 14:40 )    Color: Raina / Appearance: Clear / S.025 / pH: x  Gluc: x / Ketone: Small  / Bili: Negative / Urobili: Negative   Blood: x / Protein: 100 / Nitrite: Negative   Leuk Esterase: Trace / RBC: 5-10 /HPF / WBC 0-2 /HPF   Sq Epi: x / Non Sq Epi: Negative /HPF / Bacteria: Negative /HPF        RADIOLOGY & ADDITIONAL TESTS:    Imaging Personally Reviewed:    Consultant(s) Notes Reviewed:      Care Discussed with Consultants/Other Providers:

## 2022-10-30 NOTE — PATIENT PROFILE ADULT - CENTRAL VENOUS CATHETER/PICC LINE
[facility]   PROGRESS NOTE    ADMISSION DATE:  4/23/2020  DATE:  4/29/2020  CURRENT HOSPITAL DAY:  Hospital Day: 7  ATTENDING PHYSICIAN:  Rich DAVIES MD      SUBJECTIVE:    Patient denies chest pain, palpitations, faintness or syncope.  Patient denies shortness of breath, dyspnea on exertion, orthopnea or cough.      CURRENT MEDICATIONS:    Current Facility-Administered Medications   Medication Dose Route Frequency Provider Last Rate Last Dose   • warfarin (COUMADIN) tablet 4 mg  4 mg Oral Q Evening Shana Craig CNP   4 mg at 04/28/20 1702   • metoPROLOL tartrate (LOPRESSOR) tablet 100 mg  100 mg Oral 2 times per day Edgar Irizarry MD   100 mg at 04/29/20 0818   • WARFARIN - PHYSICIAN MONITORED 1 each  1 each Does not apply Q Evening Rich DAVIES MD   Stopped at 04/28/20 1713   • dextrose 50 % injection 25 g  25 g Intravenous PRN Rich DAVIES MD       • dextrose 50 % injection 12.5 g  12.5 g Intravenous PRN Rich DAVIES MD       • glucagon (GLUCAGEN) injection 1 mg  1 mg Intramuscular PRN Rich DAVIES MD       • dextrose (GLUTOSE) 40 % gel 15 g  15 g Oral PRN Rich DAVIES MD       • dextrose (GLUTOSE) 40 % gel 30 g  30 g Oral PRN Rich DAVIES MD       • insulin lispro (HumaLOG) injection 3-9 Units  3-9 Units Subcutaneous 4x Daily AC & HS Rich DAVIES MD   5 Units at 04/28/20 2020   • atorvastatin (LIPITOR) tablet 80 mg  80 mg Oral Nightly Rich Gaines MD   80 mg at 04/28/20 2014   • heparin (porcine) injection 5,000 Units  5,000 Units Subcutaneous 2 times per day Shana Craig CNP   5,000 Units at 04/29/20 0818   • acetaminophen (TYLENOL) tablet 650 mg  650 mg Oral Q4H PRN Shana Craig CNP       • HYDROcodone-acetaminophen (NORCO) 5-325 MG per tablet 1 tablet  1 tablet Oral Q4H PRN Shana Craig CNP   1 tablet at 04/28/20 0053   • ondansetron (ZOFRAN ODT) disintegrating tablet 4 mg  4 mg Oral Q12H PRN Shana  SUZY Craig CNP        Or   • ondansetron (ZOFRAN) injection 4 mg  4 mg Intravenous Q12H PRN Shana Craig CNP   4 mg at 04/24/20 0545   • aspirin (ECOTRIN) EC tablet 325 mg  325 mg Oral Daily Shana Craig CNP   325 mg at 04/29/20 0818   • aluminum-magnesium hydroxide-simethicone (MAALOX) 200-200-20 MG/5ML suspension 30 mL  30 mL Oral Q4H PRN Shana Craig CNP       • polyethylene glycol (MIRALAX) packet 17 g  17 g Oral Daily PRN Shana Craig CNP       • docusate sodium-sennosides (SENOKOT S) 50-8.6 MG 2 tablet  2 tablet Oral BID Shana Craig CNP   2 tablet at 04/29/20 0818   • bisacodyl (DULCOLAX) suppository 10 mg  10 mg Rectal Daily PRN Shana Craig CNP       • magnesium hydroxide (MILK OF MAGNESIA) 400 MG/5ML suspension 30 mL  30 mL Oral Daily PRN Shana Craig CNP       • potassium CHLORIDE ER tablet 20 mEq  20 mEq Oral Q4H PRN Shana Craig CNP   20 mEq at 04/28/20 0801   • potassium CHLORIDE 20 mEq/100mL IVPB premix  20 mEq Intravenous Q4H PRN Shana Craig CNP       • potassium CHLORIDE ER tablet 40 mEq  40 mEq Oral Q4H PRN Shana Craig CNP       • potassium CHLORIDE 40 mEq in sodium chloride 0.9 % 250 mL IVPB  40 mEq Intravenous Q4H PRN Shana Craig CNP       • magnesium sulfate 1 g in dextrose 5% 100 mL IVPB premix  1 g Intravenous Daily PRN Shana Craig CNP       • magnesium sulfate 2 g in 50 mL premix IVPB  2 g Intravenous Daily PRN Shana Craig CNP       • magnesium sulfate 2 g in 50 mL premix IVPB  2 g Intravenous Q4H PRN Shana Craig CNP       • traMADol (ULTRAM) tablet 50 mg  50 mg Oral Q6H PRN Shana Craig CNP       • allopurinol (ZYLOPRIM) tablet 100 mg  100 mg Oral Daily Shana Craig CNP   100 mg at 04/29/20 0818        OBJECTIVE:    VITAL SIGNS:     Vital Last Value 24 Hour Range   Temperature 97.7 °F (36.5 °C) (04/29/20 0645) Temp  Min: 97.3 °F (36.3 °C)  Max: 98.4 °F (36.9  °C)   Pulse (!) 128 (04/29/20 0645) Pulse  Min: 80  Max: 128   Respiratory 17 (04/29/20 0645) Resp  Min: 16  Max: 18   Non-Invasive  Blood Pressure 120/72 (04/29/20 0645) BP  Min: 118/57  Max: 146/75   Pulse Oximetry 95 % (04/29/20 0645) SpO2  Min: 94 %  Max: 100 %     Vital Today Admitted   Weight 92.4 kg (203 lb 11.3 oz) (04/29/20 0500) Weight: 87.5 kg (193 lb) (04/20/20 1143)   Height N/A Height: 6' (182.9 cm) (04/20/20 1143)   Body Mass Index N/A BMI (Calculated): 26.18 (04/20/20 1143)     INTAKE/OUTPUT:      Intake/Output Summary (Last 24 hours) at 4/29/2020 0906  Last data filed at 4/29/2020 0800  Gross per 24 hour   Intake 240 ml   Output 400 ml   Net -160 ml         PHYSICAL EXAM:    CONSTITUTIONAL: In no acute distress.   EYES: Conjunctiva pink, sclera white.   NECK: No JVD.   CARDIOVASCULAR: RRR, S1 & S2 present, no murmurs, rubs or gallops. No carotid bruit bilaterally.   RESPIRATORY: Even and unlabored respirations. Lungs clear to auscultation bilaterally. No wheezes or rhonchi.   EXTREMITIES:  2+ pitting edema bilaterally to the knees sKIN: Warm and dry.   MUSCULOSKELETAL: Strength grossly intact.   NEURO: Alert and Oriented x 3.             LABORATORY DATA:    Recent Results (from the past 24 hour(s))   Metered blood glucose    Collection Time: 04/28/20 11:55 AM   Result Value Ref Range    Glucose Bedside  (H) 70 - 99 mg/dL   Metered blood glucose    Collection Time: 04/28/20  4:56 PM   Result Value Ref Range    Glucose Bedside  (H) 70 - 99 mg/dL   Metered blood glucose    Collection Time: 04/28/20  8:07 PM   Result Value Ref Range    Glucose Bedside  (H) 70 - 99 mg/dL   Metered blood glucose    Collection Time: 04/28/20  9:27 PM   Result Value Ref Range    Glucose Bedside  (H) 70 - 99 mg/dL   Basic Metabolic Panel    Collection Time: 04/29/20  3:55 AM   Result Value Ref Range    Sodium 138 135 - 145 mmol/L    Potassium 3.9 3.4 - 5.1 mmol/L    Chloride 101 98 - 107 mmol/L     Carbon Dioxide 32 21 - 32 mmol/L    Anion Gap 9 (L) 10 - 20 mmol/L    Glucose 127 (H) 65 - 99 mg/dL    BUN 41 (H) 6 - 20 mg/dL    Creatinine 1.28 (H) 0.67 - 1.17 mg/dL    GFR Estimate,  61     GFR Estimate, Non African American 53     BUN/Creatinine Ratio 32 (H) 7 - 25    CALCIUM 8.0 (L) 8.4 - 10.2 mg/dL   CBC No Differential    Collection Time: 04/29/20  3:55 AM   Result Value Ref Range    WBC 7.8 4.2 - 11.0 K/mcL    RBC 2.78 (L) 4.50 - 5.90 mil/mcL    HGB 9.3 (L) 13.0 - 17.0 g/dL    HCT 28.2 (L) 39.0 - 51.0 %    .4 (H) 78.0 - 100.0 fl    MCH 33.5 26.0 - 34.0 pg    MCHC 33.0 32.0 - 36.5 g/dL    RDW-CV 13.1 11.0 - 15.0 %     (L) 140 - 450 K/mcL    NRBC 0 0 /100 WBC   Prothrombin Time    Collection Time: 04/29/20  3:55 AM   Result Value Ref Range    PROTIME 11.5 9.7 - 11.8 sec    INR 1.1    Metered blood glucose    Collection Time: 04/29/20  6:51 AM   Result Value Ref Range    Glucose Bedside  (H) 70 - 99 mg/dL         IMAGING STUDIES:                               ASSESSMENT/PLAN:     1.  Aortic stenosis-status post aortic valve replacement with a #23 [place] trifecta bovine pericardial valve.  Postop day #5.  Continue with postoperative care.  Patient will need anticoagulation with Coumadin for 3 months for the valve.  After that patient will not need any anticoagulation since the left atrial appendage has been removed. LVEF PREOP 50%  2.  Chronic and permanent atrial fibrillation-CTA showed possible thrombus in the left atrial appendage.  The left atrial appendage has been removed.  We will need rate control for the atrial fibrillation.  3.  Hypertension-continue to monitor  4.  Hyperlipidemia-restart statin therapy in the future  5.  Carotid stenosis-left is 100% occluded, mild to moderate disease.  Continue to monitor  6.  Chronic kidney disease-continue to observe  7.  Decreased platelets-platelet count improving, 111,000 today       I would like to give the patient 2 mg  of Bumex IV push x1.  Increase metoprolol.  Question discharge tomorrow.  Discharge planning in progress.  Continue Coumadin.                     Edgar Irizarry MD, FACC, [facility]   no

## 2022-10-30 NOTE — PATIENT PROFILE ADULT - CAREGIVER RELATION TO PATIENT
1. MARINE with bubble study for evaluation of ASD and further characterization.     -The risks, benefits & alternatives of the procedure were explained to the patient.    -The risks of transesophageal echo include but are not limited to:  Dental trauma, esophageal trauma/perforation, bleeding, laryngospasm/brochospasm, aspiration, sore throat/hoarseness, & dislodgement of the endotracheal tube/nasogastric tube (where applicable).    -The risks of moderate sedation include hypotension, respiratory depression, arrhythmias, bronchospasm, & death.    -Informed consent was obtained & the patient is agreeable to proceed with the procedure.     I will discuss with the attending physician. Attending addendum is to follow.      Further recommendations per attending addendum     Jatin Ruth MD  PGY-V Cardiology Fellow  [phone]          neighbor

## 2022-10-30 NOTE — CONSULT NOTE ADULT - ASSESSMENT
1. Abdominal pain  2. Acute pancreatitis  3. R/o CBD stone    Suggestions:    1. NPO  2. IVF hydration  3. Protonix daily  4. Avoid NSAID  5. Pain control  6. MRI / MRCP  7. Follow up lipase  8. DVT prophylaxis

## 2022-10-30 NOTE — PATIENT PROFILE ADULT - FALL HARM RISK - RISK INTERVENTIONS
Assistance OOB with selected safe patient handling equipment/Assistance with ambulation/Communicate Fall Risk and Risk Factors to all staff, patient, and family/Monitor gait and stability/Reinforce activity limits and safety measures with patient and family/Visual Cue: Yellow wristband/Bed in lowest position, wheels locked, appropriate side rails in place/Call bell, personal items and telephone in reach/Instruct patient to call for assistance before getting out of bed or chair/Non-slip footwear when patient is out of bed/[place] to call system/Physically safe environment - no spills, clutter or unnecessary equipment/Purposeful Proactive Rounding/Room/bathroom lighting operational, light cord in reach Assistance OOB with selected safe patient handling equipment/Assistance with ambulation/Communicate Fall Risk and Risk Factors to all staff, patient, and family/Discuss with provider need for PT consult/Monitor gait and stability/Provide patient with walking aids - walker, cane, crutches/Reinforce activity limits and safety measures with patient and family/Visual Cue: Yellow wristband/Bed in lowest position, wheels locked, appropriate side rails in place/Call bell, personal items and telephone in reach/Instruct patient to call for assistance before getting out of bed or chair/Non-slip footwear when patient is out of bed/[place] to call system/Physically safe environment - no spills, clutter or unnecessary equipment/Purposeful Proactive Rounding/Room/bathroom lighting operational, light cord in reach

## 2022-10-30 NOTE — CONSULT NOTE ADULT - NEGATIVE MUSCULOSKELETAL SYMPTOMS
no muscle cramps/no muscle weakness/no stiffness/no arm pain L/no back pain/no leg pain L/no leg pain R

## 2022-10-30 NOTE — CONSULT NOTE ADULT - ASSESSMENT
Patient is a 77y old  Female from home, ambulates independently, with no significant PMHx, presents to the ER for evaluation of worsening abdominal pain x 3d. She states her abdominal pain started on Thursday, after taking new abx, amoxicillin  started on Monday after a recent dental removal. She describes abdominal pain to radiate to her back with sudden nausea and vomiting waking her up on Friday morning. She states that she has not been able to hold any food/ drink down. ON Admission, she found to have no fever but Leukocytosis. The CT abd/pelvis shows Acute Pancreatitis. The ID consult requested to assist with further evaluation and antibiotic management.     # Acute Pancreatitis  # Leukocytosis     would recommend:    1. NPO, IVF and pain management  2. Monitor WBC count  3. Monitor oFF ABx     will follow the patient with you and make further recommendation based on the clinical course and Lab results  Thank you for the opportunity to participate in Ms. ARDON's care      Attending Attestation:    Spent more than 65 minutes on total encounter, more than 50 % of the visit was spent counseling and/or coordinating care by the Attending physician.       Patient is a 77y old  Female from home, ambulates independently, with no significant PMHx, presents to the ER for evaluation of worsening abdominal pain x 3d. She states her abdominal pain started on Thursday, after taking new abx, amoxicillin  started on Monday after a recent dental removal. She describes abdominal pain to radiate to her back with sudden nausea and vomiting waking her up on Friday morning. She states that she has not been able to hold any food/ drink down. ON Admission, she found to have no fever but Leukocytosis. The CT abd/pelvis shows Acute Pancreatitis. The ID consult requested to assist with further evaluation and antibiotic management.     # Acute Pancreatitis- r/o Gallstone pancreatitis  # Leukocytosis     would recommend:    1. NPO, IVF and pain management  2. Monitor WBC count  3. Monitor oFF ABx   4. Abdominal US  since Gall bladder mildly distended    d/w Patient     will follow the patient with you and make further recommendation based on the clinical course and Lab results  Thank you for the opportunity to participate in Ms. ARDON's care      Attending Attestation:    Spent more than 65 minutes on total encounter, more than 50 % of the visit was spent counseling and/or coordinating care by the Attending physician.

## 2022-10-31 LAB
-  AMIKACIN: SIGNIFICANT CHANGE UP
-  AMOXICILLIN/CLAVULANIC ACID: SIGNIFICANT CHANGE UP
-  AMPICILLIN/SULBACTAM: SIGNIFICANT CHANGE UP
-  AMPICILLIN: SIGNIFICANT CHANGE UP
-  AZTREONAM: SIGNIFICANT CHANGE UP
-  CEFAZOLIN: SIGNIFICANT CHANGE UP
-  CEFEPIME: SIGNIFICANT CHANGE UP
-  CEFOXITIN: SIGNIFICANT CHANGE UP
-  CEFTRIAXONE: SIGNIFICANT CHANGE UP
-  CIPROFLOXACIN: SIGNIFICANT CHANGE UP
-  ERTAPENEM: SIGNIFICANT CHANGE UP
-  GENTAMICIN: SIGNIFICANT CHANGE UP
-  IMIPENEM: SIGNIFICANT CHANGE UP
-  LEVOFLOXACIN: SIGNIFICANT CHANGE UP
-  MEROPENEM: SIGNIFICANT CHANGE UP
-  NITROFURANTOIN: SIGNIFICANT CHANGE UP
-  PIPERACILLIN/TAZOBACTAM: SIGNIFICANT CHANGE UP
-  TIGECYCLINE: SIGNIFICANT CHANGE UP
-  TOBRAMYCIN: SIGNIFICANT CHANGE UP
-  TRIMETHOPRIM/SULFAMETHOXAZOLE: SIGNIFICANT CHANGE UP
ALBUMIN SERPL ELPH-MCNC: 2.4 G/DL — LOW (ref 3.5–5)
ALP SERPL-CCNC: 88 U/L — SIGNIFICANT CHANGE UP (ref 40–120)
ALT FLD-CCNC: 27 U/L DA — SIGNIFICANT CHANGE UP (ref 10–60)
ANION GAP SERPL CALC-SCNC: 10 MMOL/L — SIGNIFICANT CHANGE UP (ref 5–17)
AST SERPL-CCNC: 18 U/L — SIGNIFICANT CHANGE UP (ref 10–40)
BASOPHILS # BLD AUTO: 0.02 K/UL — SIGNIFICANT CHANGE UP (ref 0–0.2)
BASOPHILS NFR BLD AUTO: 0.2 % — SIGNIFICANT CHANGE UP (ref 0–2)
BILIRUB SERPL-MCNC: 0.7 MG/DL — SIGNIFICANT CHANGE UP (ref 0.2–1.2)
BUN SERPL-MCNC: 20 MG/DL — HIGH (ref 7–18)
CALCIUM SERPL-MCNC: 8.6 MG/DL — SIGNIFICANT CHANGE UP (ref 8.4–10.5)
CHLORIDE SERPL-SCNC: 111 MMOL/L — HIGH (ref 96–108)
CO2 SERPL-SCNC: 25 MMOL/L — SIGNIFICANT CHANGE UP (ref 22–31)
CREAT SERPL-MCNC: 0.58 MG/DL — SIGNIFICANT CHANGE UP (ref 0.5–1.3)
CULTURE RESULTS: SIGNIFICANT CHANGE UP
EGFR: 92 ML/MIN/1.73M2 — SIGNIFICANT CHANGE UP
EOSINOPHIL # BLD AUTO: 0 K/UL — SIGNIFICANT CHANGE UP (ref 0–0.5)
EOSINOPHIL NFR BLD AUTO: 0 % — SIGNIFICANT CHANGE UP (ref 0–6)
GLUCOSE SERPL-MCNC: 93 MG/DL — SIGNIFICANT CHANGE UP (ref 70–99)
HCT VFR BLD CALC: 42.5 % — SIGNIFICANT CHANGE UP (ref 34.5–45)
HGB BLD-MCNC: 14.2 G/DL — SIGNIFICANT CHANGE UP (ref 11.5–15.5)
IMM GRANULOCYTES NFR BLD AUTO: 1.3 % — HIGH (ref 0–0.9)
LYMPHOCYTES # BLD AUTO: 0.61 K/UL — LOW (ref 1–3.3)
LYMPHOCYTES # BLD AUTO: 4.8 % — LOW (ref 13–44)
MAGNESIUM SERPL-MCNC: 2.3 MG/DL — SIGNIFICANT CHANGE UP (ref 1.6–2.6)
MCHC RBC-ENTMCNC: 29.1 PG — SIGNIFICANT CHANGE UP (ref 27–34)
MCHC RBC-ENTMCNC: 33.4 GM/DL — SIGNIFICANT CHANGE UP (ref 32–36)
MCV RBC AUTO: 87.1 FL — SIGNIFICANT CHANGE UP (ref 80–100)
METHOD TYPE: SIGNIFICANT CHANGE UP
MONOCYTES # BLD AUTO: 1.2 K/UL — HIGH (ref 0–0.9)
MONOCYTES NFR BLD AUTO: 9.4 % — SIGNIFICANT CHANGE UP (ref 2–14)
NEUTROPHILS # BLD AUTO: 10.82 K/UL — HIGH (ref 1.8–7.4)
NEUTROPHILS NFR BLD AUTO: 84.3 % — HIGH (ref 43–77)
NRBC # BLD: 0 /100 WBCS — SIGNIFICANT CHANGE UP (ref 0–0)
ORGANISM # SPEC MICROSCOPIC CNT: SIGNIFICANT CHANGE UP
ORGANISM # SPEC MICROSCOPIC CNT: SIGNIFICANT CHANGE UP
PHOSPHATE SERPL-MCNC: 1.2 MG/DL — LOW (ref 2.5–4.5)
PLATELET # BLD AUTO: 186 K/UL — SIGNIFICANT CHANGE UP (ref 150–400)
POTASSIUM SERPL-MCNC: 3.5 MMOL/L — SIGNIFICANT CHANGE UP (ref 3.5–5.3)
POTASSIUM SERPL-SCNC: 3.5 MMOL/L — SIGNIFICANT CHANGE UP (ref 3.5–5.3)
PROT SERPL-MCNC: 6.2 G/DL — SIGNIFICANT CHANGE UP (ref 6–8.3)
RBC # BLD: 4.88 M/UL — SIGNIFICANT CHANGE UP (ref 3.8–5.2)
RBC # FLD: 12.6 % — SIGNIFICANT CHANGE UP (ref 10.3–14.5)
SODIUM SERPL-SCNC: 146 MMOL/L — HIGH (ref 135–145)
SPECIMEN SOURCE: SIGNIFICANT CHANGE UP
WBC # BLD: 12.82 K/UL — HIGH (ref 3.8–10.5)
WBC # FLD AUTO: 12.82 K/UL — HIGH (ref 3.8–10.5)

## 2022-10-31 RX ORDER — MORPHINE SULFATE 50 MG/1
2 CAPSULE, EXTENDED RELEASE ORAL ONCE
Refills: 0 | Status: DISCONTINUED | OUTPATIENT
Start: 2022-10-31 | End: 2022-10-31

## 2022-10-31 RX ORDER — SODIUM CHLORIDE 9 MG/ML
1000 INJECTION, SOLUTION INTRAVENOUS
Refills: 0 | Status: DISCONTINUED | OUTPATIENT
Start: 2022-10-31 | End: 2022-11-02

## 2022-10-31 RX ORDER — POTASSIUM PHOSPHATE, MONOBASIC POTASSIUM PHOSPHATE, DIBASIC 236; 224 MG/ML; MG/ML
30 INJECTION, SOLUTION INTRAVENOUS ONCE
Refills: 0 | Status: COMPLETED | OUTPATIENT
Start: 2022-10-31 | End: 2022-10-31

## 2022-10-31 RX ADMIN — MORPHINE SULFATE 2 MILLIGRAM(S): 50 CAPSULE, EXTENDED RELEASE ORAL at 10:13

## 2022-10-31 RX ADMIN — ENOXAPARIN SODIUM 40 MILLIGRAM(S): 100 INJECTION SUBCUTANEOUS at 05:09

## 2022-10-31 RX ADMIN — MORPHINE SULFATE 2 MILLIGRAM(S): 50 CAPSULE, EXTENDED RELEASE ORAL at 06:07

## 2022-10-31 RX ADMIN — SODIUM CHLORIDE 200 MILLILITER(S): 9 INJECTION, SOLUTION INTRAVENOUS at 10:41

## 2022-10-31 RX ADMIN — MORPHINE SULFATE 2 MILLIGRAM(S): 50 CAPSULE, EXTENDED RELEASE ORAL at 17:32

## 2022-10-31 RX ADMIN — MORPHINE SULFATE 2 MILLIGRAM(S): 50 CAPSULE, EXTENDED RELEASE ORAL at 06:24

## 2022-10-31 RX ADMIN — MORPHINE SULFATE 2 MILLIGRAM(S): 50 CAPSULE, EXTENDED RELEASE ORAL at 00:30

## 2022-10-31 RX ADMIN — POTASSIUM PHOSPHATE, MONOBASIC POTASSIUM PHOSPHATE, DIBASIC 83.33 MILLIMOLE(S): 236; 224 INJECTION, SOLUTION INTRAVENOUS at 10:37

## 2022-10-31 RX ADMIN — MORPHINE SULFATE 2 MILLIGRAM(S): 50 CAPSULE, EXTENDED RELEASE ORAL at 17:12

## 2022-10-31 RX ADMIN — MORPHINE SULFATE 2 MILLIGRAM(S): 50 CAPSULE, EXTENDED RELEASE ORAL at 11:13

## 2022-10-31 RX ADMIN — MORPHINE SULFATE 1 MILLIGRAM(S): 50 CAPSULE, EXTENDED RELEASE ORAL at 23:45

## 2022-10-31 NOTE — PROGRESS NOTE ADULT - SUBJECTIVE AND OBJECTIVE BOX
PGY1 Progress Note discussed with attending     PAGER #: [[phone]] TILL 5:00 PM  PLEASE CONTACT ON CALL TEAM:  - On Call Team (Please refer to Elizabeth) FROM 5:00 PM - 8:30PM  - Nightfloat Team FROM 8:30 -7:30 AM    CHIEF COMPLAINT & BRIEF HOSPITAL COURSE:    INTERVAL HPI/OVERNIGHT EVENTS:       REVIEW OF SYSTEMS:  CONSTITUTIONAL: No fever, weight loss, or fatigue  RESPIRATORY: No cough, wheezing, chills or hemoptysis; No shortness of breath  CARDIOVASCULAR: No chest pain, palpitations, dizziness, or leg swelling  GASTROINTESTINAL: No abdominal pain. No nausea, vomiting, or hematemesis; No diarrhea or constipation. No melena or hematochezia.  GENITOURINARY: No dysuria or hematuria, urinary frequency  NEUROLOGICAL: No headaches, memory loss, loss of strength, numbness, or tremors  SKIN: No itching, burning, rashes, or lesions   MEDICATIONS  (STANDING):  enoxaparin Injectable 40 milliGRAM(s) SubCutaneous every 24 hours  lactated ringers. 1000 milliLiter(s) (200 mL/Hr) IV Continuous <Continuous>  sodium chloride 0.45%. 1000 milliLiter(s) (100 mL/Hr) IV Continuous <Continuous>    MEDICATIONS  (PRN):  acetaminophen     Tablet .. 650 milliGRAM(s) Oral every 6 hours PRN Temp greater or equal to 38C (100.4F), Mild Pain (1 - 3)  aluminum hydroxide/magnesium hydroxide/simethicone Suspension 30 milliLiter(s) Oral every 4 hours PRN Dyspepsia  morphine  - Injectable 2 milliGRAM(s) IV Push every 6 hours PRN Severe Pain (7 - 10)  morphine  - Injectable 1 milliGRAM(s) IV Push every 6 hours PRN breakthrough pain  ondansetron Injectable 4 milliGRAM(s) IV Push every 8 hours PRN Nausea and/or Vomiting    Vital Signs Last 24 Hrs  T(C): 36.7 (31 Oct 2022 14:05), Max: 37.3 (31 Oct 2022 06:04)  T(F): 98 (31 Oct 2022 14:05), Max: 99.2 (31 Oct 2022 06:04)  HR: 81 (31 Oct 2022 14:05) (76 - 89)  BP: 163/69 (31 Oct 2022 14:05) (145/74 - 181/77)  BP(mean): --  RR: 20 (31 Oct 2022 14:05) (17 - 20)  SpO2: 93% (31 Oct 2022 14:05) (92% - 97%)    Parameters below as of 31 Oct 2022 14:05  Patient On (Oxygen Delivery Method): room air        PHYSICAL EXAMINATION:  GENERAL: NAD, well built  HEAD:  Atraumatic, Normocephalic  EYES:  conjunctiva and sclera clear  NECK: Supple, No JVD, Normal thyroid  CHEST/LUNG: Clear to auscultation. Clear to percussion bilaterally; No rales, rhonchi, wheezing, or rubs  HEART: Regular rate and rhythm; No murmurs, rubs, or gallops  ABDOMEN: Soft, Nontender, Nondistended; Bowel sounds present  NERVOUS SYSTEM:  Alert & Oriented X3,    EXTREMITIES:  2+ Peripheral Pulses, No clubbing, cyanosis, or edema  SKIN: warm dry                          14.2   12.82 )-----------( 186      ( 31 Oct 2022 07:55 )             42.5     10-31    146<H>  |  111<H>  |  20<H>  ----------------------------<  93  3.5   |  25  |  0.58    Ca    8.6      31 Oct 2022 07:55  Phos  1.2     10-31  Mg     2.3     10-31    TPro  6.2  /  Alb  2.4<L>  /  TBili  0.7  /  DBili  x   /  AST  18  /  ALT  27  /  AlkPhos  88  10-31    LIVER FUNCTIONS - ( 31 Oct 2022 07:55 )  Alb: 2.4 g/dL / Pro: 6.2 g/dL / ALK PHOS: 88 U/L / ALT: 27 U/L DA / AST: 18 U/L / GGT: x                   CAPILLARY BLOOD GLUCOSE      RADIOLOGY & ADDITIONAL TESTS:                   PGY1 Progress Note discussed with attending     PAGER #: [[phone]] TILL 5:00 PM  PLEASE CONTACT ON CALL TEAM:  - On Call Team (Please refer to Elizabeth) FROM 5:00 PM - 8:30PM  - Nightfloat Team FROM 8:30 -7:30 AM    CHIEF COMPLAINT & BRIEF HOSPITAL COURSE:    INTERVAL HPI/OVERNIGHT EVENTS: No acute events noted overnight. Patient still complaining of abdominal pains. Denies nausea or vomiting. Denies fevers or chills.       REVIEW OF SYSTEMS:  CONSTITUTIONAL: No fever, weight loss, or fatigue  RESPIRATORY: No cough, wheezing, chills or hemoptysis; No shortness of breath  CARDIOVASCULAR: No chest pain, palpitations, dizziness, or leg swelling  GASTROINTESTINAL: Has  abdominal pain. No nausea, vomiting, or hematemesis; No diarrhea or constipation. No melena or hematochezia.  GENITOURINARY: No dysuria or hematuria, urinary frequency  NEUROLOGICAL: No headaches, memory loss, loss of strength, numbness, or tremors  SKIN: No itching, burning, rashes, or lesions   MEDICATIONS  (STANDING):  enoxaparin Injectable 40 milliGRAM(s) SubCutaneous every 24 hours  lactated ringers. 1000 milliLiter(s) (200 mL/Hr) IV Continuous <Continuous>  sodium chloride 0.45%. 1000 milliLiter(s) (100 mL/Hr) IV Continuous <Continuous>    MEDICATIONS  (PRN):  acetaminophen     Tablet .. 650 milliGRAM(s) Oral every 6 hours PRN Temp greater or equal to 38C (100.4F), Mild Pain (1 - 3)  aluminum hydroxide/magnesium hydroxide/simethicone Suspension 30 milliLiter(s) Oral every 4 hours PRN Dyspepsia  morphine  - Injectable 2 milliGRAM(s) IV Push every 6 hours PRN Severe Pain (7 - 10)  morphine  - Injectable 1 milliGRAM(s) IV Push every 6 hours PRN breakthrough pain  ondansetron Injectable 4 milliGRAM(s) IV Push every 8 hours PRN Nausea and/or Vomiting    Vital Signs Last 24 Hrs  T(C): 36.7 (31 Oct 2022 14:05), Max: 37.3 (31 Oct 2022 06:04)  T(F): 98 (31 Oct 2022 14:05), Max: 99.2 (31 Oct 2022 06:04)  HR: 81 (31 Oct 2022 14:05) (76 - 89)  BP: 163/69 (31 Oct 2022 14:05) (145/74 - 181/77)  BP(mean): --  RR: 20 (31 Oct 2022 14:05) (17 - 20)  SpO2: 93% (31 Oct 2022 14:05) (92% - 97%)    Parameters below as of 31 Oct 2022 14:05  Patient On (Oxygen Delivery Method): room air        PHYSICAL EXAMINATION:  GENERAL: NAD, well built  HEAD:  Atraumatic, Normocephalic  EYES:  conjunctiva and sclera clear  NECK: Supple, No JVD, Normal thyroid  CHEST/LUNG: Clear to auscultation. Clear to percussion bilaterally; No rales, rhonchi, wheezing, or rubs  HEART: Regular rate and rhythm; No murmurs, rubs, or gallops  ABDOMEN: Soft, tender in epigastric region, Nondistended; Bowel sounds present  NERVOUS SYSTEM:  Alert & Oriented X3,    EXTREMITIES:  2+ Peripheral Pulses, No clubbing, cyanosis, or edema  SKIN: warm dry                          14.2   12.82 )-----------( 186      ( 31 Oct 2022 07:55 )             42.5     10-31    146<H>  |  111<H>  |  20<H>  ----------------------------<  93  3.5   |  25  |  0.58    Ca    8.6      31 Oct 2022 07:55  Phos  1.2     10-31  Mg     2.3     10-31    TPro  6.2  /  Alb  2.4<L>  /  TBili  0.7  /  DBili  x   /  AST  18  /  ALT  27  /  AlkPhos  88  10-31    LIVER FUNCTIONS - ( 31 Oct 2022 07:55 )  Alb: 2.4 g/dL / Pro: 6.2 g/dL / ALK PHOS: 88 U/L / ALT: 27 U/L DA / AST: 18 U/L / GGT: x                   CAPILLARY BLOOD GLUCOSE      RADIOLOGY & ADDITIONAL TESTS:

## 2022-10-31 NOTE — PROGRESS NOTE ADULT - GASTROINTESTINAL
details… soft/nondistended/normal active bowel sounds/no guarding/no rigidity/no organomegaly/no palpable anibal/no masses palpable/tender

## 2022-10-31 NOTE — DIETITIAN INITIAL EVALUATION ADULT - SIGNS/SYMPTOMS
AEB poor PO intake PTA <25%x7 days, moderate fat & muscle loss, NPO x3 days, small ketones in urine.

## 2022-10-31 NOTE — DIETITIAN INITIAL EVALUATION ADULT - PERTINENT MEDS FT
MEDICATIONS  (STANDING):  enoxaparin Injectable 40 milliGRAM(s) SubCutaneous every 24 hours  lactated ringers. 1000 milliLiter(s) (200 mL/Hr) IV Continuous <Continuous>  sodium chloride 0.45%. 1000 milliLiter(s) (100 mL/Hr) IV Continuous <Continuous>    MEDICATIONS  (PRN):  acetaminophen     Tablet .. 650 milliGRAM(s) Oral every 6 hours PRN Temp greater or equal to 38C (100.4F), Mild Pain (1 - 3)  aluminum hydroxide/magnesium hydroxide/simethicone Suspension 30 milliLiter(s) Oral every 4 hours PRN Dyspepsia  morphine  - Injectable 2 milliGRAM(s) IV Push every 6 hours PRN Severe Pain (7 - 10)  morphine  - Injectable 1 milliGRAM(s) IV Push every 6 hours PRN breakthrough pain  ondansetron Injectable 4 milliGRAM(s) IV Push every 8 hours PRN Nausea and/or Vomiting

## 2022-10-31 NOTE — PROGRESS NOTE ADULT - ASSESSMENT
Patient is a 77y old  Female from home, ambulates independently, with no significant PMHx, presents to the ER for evaluation of worsening abdominal pain x 3d. She states her abdominal pain started on Thursday, after taking new abx, amoxicillin  started on Monday after a recent dental removal. She describes abdominal pain to radiate to her back with sudden nausea and vomiting waking her up on Friday morning. She states that she has not been able to hold any food/ drink down. ON Admission, she found to have no fever but Leukocytosis. The CT abd/pelvis shows Acute Pancreatitis. The ID consult requested to assist with further evaluation and antibiotic management.     # Acute Pancreatitis- r/o Gallstone pancreatitis  # Leukocytosis - resolving   # Positive Urine culture- most likely a contaminant in the setting of Negative Urine analysis    would recommend:    1. Monitor oFF ABx   2. NPO, IVF and pain management  3. Monitor WBC count, started trending down   4. Abdominal US  since Gall bladder mildly distended    d/w Patient     Attending Attestation:    Spent more than 45 minutes on total encounter, more than 50 % of the visit was spent counseling and/or coordinating care by the Attending physician.

## 2022-10-31 NOTE — DIETITIAN INITIAL EVALUATION ADULT - OTHER INFO
77 years old female with admit diagnoses acute pancreatitis without infection visited earlier today. Pt states she had some dental work done x1 week was on abt therapy and noticed decrease in her Po intake, was eating only soup, does not know of any weight loss -129 lbs. C/o N/V two days PTA but for now its resolves (as per pt). pt NPO on IV fluids for hydration; she states she is not hungry because she is in a lot of pain which is controlled with pain medication. Pt is lactose intolerant can only tolerate hard cheese.

## 2022-10-31 NOTE — PROGRESS NOTE ADULT - SUBJECTIVE AND OBJECTIVE BOX
[   ] ICU                                          [   ] CCU                                      [ X  ] Medical Floor        Patient is a 77 year old female with abdominal pain. GI consulted to evaluate.         77 year old female from home, ambulates independently, with out significant past medical history presented with 3 days history of progressively worsening sharp constant, 8/10 intensity epigastric abdominal pain radiating to her back associated with nausea, non bloody vomiting. Patient reports abdominal pain started after starting taking Amoxacillin for her recent dental removal. Patient denies hematemesis, hematochezia, melena, fever, chills, chest pain, SOB, cough, hematuria, dysuria, diarrhea or taking ETOH.    Patient is comfortable. No new complaints reported, No abdominal pain, N/V, hematemesis, hematochezia, melena, fever, chills, chest pain, SOB, cough or diarrhea reported.           PAIN MANAGEMENT:  Pain Scale:                5/10  Pain Location:  Epigastric abdominal pain      Prior Colonoscopy:  No prior colonoscopy      PAST MEDICAL HISTORY  No significant past medical history reported      PAST SURGICAL HISTORY  No significant surgical history reported      Allergies    No Known Allergies    Intolerances  None       SOCIAL HISTORY  Advanced Directives:       [ X ] Full Code       [  ] DNR  Marital Status:         [  ] M      [ X ] S      [  ] D       [  ] W  Children:       [ X ] Yes      [  ] No  Occupation:        [  ] Employed       [ X] Unemployed       [  ] Retired  Diet:       [ X ] Regular       [  ] PEG feeding          [  ] NG tube feeding  Drug Use:           [ X ] Patient denied          [  ] Yes  Alcohol:           [  X] No             [  ] Yes (socially)         [  ] Yes (chronic)  Tobacco:           [  ] Yes           [  X] No      FAMILY HISTORY  [ X ] Heart Disease            [ X ] Diabetes             [ X ] HTN             [  ] Colon Cancer             [  ] Stomach Cancer              [  ] Pancreatic Cancer        VITALS  Vital Signs Last 24 Hrs  T(C): 36.4 (31 Oct 2022 10:13), Max: 37.3 (31 Oct 2022 06:04)  T(F): 97.5 (31 Oct 2022 10:13), Max: 99.2 (31 Oct 2022 06:04)  HR: 76 (31 Oct 2022 10:13) (76 - 89)  BP: 152/81 (31 Oct 2022 10:13) (145/74 - 181/77)  RR: 18 (31 Oct 2022 10:13) (17 - 18)  SpO2: 97% (31 Oct 2022 10:13) (92% - 97%)  Parameters below as of 31 Oct 2022 10:13  Patient On (Oxygen Delivery Method): room air      MEDICATIONS  (STANDING):  enoxaparin Injectable 40 milliGRAM(s) SubCutaneous every 24 hours  lactated ringers. 1000 milliLiter(s) (200 mL/Hr) IV Continuous <Continuous>  sodium chloride 0.45%. 1000 milliLiter(s) (100 mL/Hr) IV Continuous <Continuous>    MEDICATIONS  (PRN):  acetaminophen     Tablet .. 650 milliGRAM(s) Oral every 6 hours PRN Temp greater or equal to 38C (100.4F), Mild Pain (1 - 3)  aluminum hydroxide/magnesium hydroxide/simethicone Suspension 30 milliLiter(s) Oral every 4 hours PRN Dyspepsia  morphine  - Injectable 2 milliGRAM(s) IV Push every 6 hours PRN Severe Pain (7 - 10)  morphine  - Injectable 1 milliGRAM(s) IV Push every 6 hours PRN breakthrough pain  ondansetron Injectable 4 milliGRAM(s) IV Push every 8 hours PRN Nausea and/or Vomiting                            14.2   12.82 )-----------( 186      ( 31 Oct 2022 07:55 )             42.5       10-31    146<H>  |  111<H>  |  20<H>  ----------------------------<  93  3.5   |  25  |  0.58    Ca    8.6      31 Oct 2022 07:55  Phos  1.2     10-31  Mg     2.3     10-31    TPro  6.2  /  Alb  2.4<L>  /  TBili  0.7  /  DBili  x   /  AST  18  /  ALT  27  /  AlkPhos  88  10-31

## 2022-10-31 NOTE — DIETITIAN INITIAL EVALUATION ADULT - PERTINENT LABORATORY DATA
10-31    146<H>  |  111<H>  |  20<H>  ----------------------------<  93  3.5   |  25  |  0.58    Ca    8.6      31 Oct 2022 07:55  Phos  1.2     10-31  Mg     2.3     10-31    TPro  6.2  /  Alb  2.4<L>  /  TBili  0.7  /  DBili  x   /  AST  18  /  ALT  27  /  AlkPhos  88  10-31

## 2022-11-01 LAB
ALBUMIN SERPL ELPH-MCNC: 2.2 G/DL — LOW (ref 3.5–5)
ALP SERPL-CCNC: 90 U/L — SIGNIFICANT CHANGE UP (ref 40–120)
ALT FLD-CCNC: 25 U/L DA — SIGNIFICANT CHANGE UP (ref 10–60)
ANION GAP SERPL CALC-SCNC: 5 MMOL/L — SIGNIFICANT CHANGE UP (ref 5–17)
AST SERPL-CCNC: 16 U/L — SIGNIFICANT CHANGE UP (ref 10–40)
BILIRUB SERPL-MCNC: 0.7 MG/DL — SIGNIFICANT CHANGE UP (ref 0.2–1.2)
BUN SERPL-MCNC: 18 MG/DL — SIGNIFICANT CHANGE UP (ref 7–18)
CALCIUM SERPL-MCNC: 8.2 MG/DL — LOW (ref 8.4–10.5)
CHLORIDE SERPL-SCNC: 113 MMOL/L — HIGH (ref 96–108)
CO2 SERPL-SCNC: 26 MMOL/L — SIGNIFICANT CHANGE UP (ref 22–31)
CREAT SERPL-MCNC: 0.43 MG/DL — LOW (ref 0.5–1.3)
EGFR: 99 ML/MIN/1.73M2 — SIGNIFICANT CHANGE UP
GLUCOSE BLDC GLUCOMTR-MCNC: 78 MG/DL — SIGNIFICANT CHANGE UP (ref 70–99)
GLUCOSE SERPL-MCNC: 86 MG/DL — SIGNIFICANT CHANGE UP (ref 70–99)
HCT VFR BLD CALC: 41.3 % — SIGNIFICANT CHANGE UP (ref 34.5–45)
HGB BLD-MCNC: 13.7 G/DL — SIGNIFICANT CHANGE UP (ref 11.5–15.5)
MAGNESIUM SERPL-MCNC: 2 MG/DL — SIGNIFICANT CHANGE UP (ref 1.6–2.6)
MCHC RBC-ENTMCNC: 28.6 PG — SIGNIFICANT CHANGE UP (ref 27–34)
MCHC RBC-ENTMCNC: 33.2 GM/DL — SIGNIFICANT CHANGE UP (ref 32–36)
MCV RBC AUTO: 86.2 FL — SIGNIFICANT CHANGE UP (ref 80–100)
NRBC # BLD: 0 /100 WBCS — SIGNIFICANT CHANGE UP (ref 0–0)
PHOSPHATE SERPL-MCNC: 1.7 MG/DL — LOW (ref 2.5–4.5)
PLATELET # BLD AUTO: 215 K/UL — SIGNIFICANT CHANGE UP (ref 150–400)
POTASSIUM SERPL-MCNC: 3.4 MMOL/L — LOW (ref 3.5–5.3)
POTASSIUM SERPL-SCNC: 3.4 MMOL/L — LOW (ref 3.5–5.3)
PROT SERPL-MCNC: 5.8 G/DL — LOW (ref 6–8.3)
RBC # BLD: 4.79 M/UL — SIGNIFICANT CHANGE UP (ref 3.8–5.2)
RBC # FLD: 12.4 % — SIGNIFICANT CHANGE UP (ref 10.3–14.5)
SODIUM SERPL-SCNC: 144 MMOL/L — SIGNIFICANT CHANGE UP (ref 135–145)
WBC # BLD: 12.61 K/UL — HIGH (ref 3.8–10.5)
WBC # FLD AUTO: 12.61 K/UL — HIGH (ref 3.8–10.5)

## 2022-11-01 PROCEDURE — 76705 ECHO EXAM OF ABDOMEN: CPT | Mod: 26

## 2022-11-01 RX ORDER — POTASSIUM PHOSPHATE, MONOBASIC POTASSIUM PHOSPHATE, DIBASIC 236; 224 MG/ML; MG/ML
30 INJECTION, SOLUTION INTRAVENOUS ONCE
Refills: 0 | Status: COMPLETED | OUTPATIENT
Start: 2022-11-01 | End: 2022-11-01

## 2022-11-01 RX ORDER — POTASSIUM CHLORIDE 20 MEQ
10 PACKET (EA) ORAL
Refills: 0 | Status: COMPLETED | OUTPATIENT
Start: 2022-11-01 | End: 2022-11-01

## 2022-11-01 RX ORDER — MORPHINE SULFATE 50 MG/1
1 CAPSULE, EXTENDED RELEASE ORAL EVERY 6 HOURS
Refills: 0 | Status: DISCONTINUED | OUTPATIENT
Start: 2022-11-01 | End: 2022-11-02

## 2022-11-01 RX ADMIN — Medication 100 MILLIEQUIVALENT(S): at 09:25

## 2022-11-01 RX ADMIN — MORPHINE SULFATE 2 MILLIGRAM(S): 50 CAPSULE, EXTENDED RELEASE ORAL at 06:50

## 2022-11-01 RX ADMIN — POTASSIUM PHOSPHATE, MONOBASIC POTASSIUM PHOSPHATE, DIBASIC 83.33 MILLIMOLE(S): 236; 224 INJECTION, SOLUTION INTRAVENOUS at 08:50

## 2022-11-01 RX ADMIN — Medication 100 MILLIEQUIVALENT(S): at 11:01

## 2022-11-01 RX ADMIN — MORPHINE SULFATE 2 MILLIGRAM(S): 50 CAPSULE, EXTENDED RELEASE ORAL at 16:37

## 2022-11-01 RX ADMIN — MORPHINE SULFATE 2 MILLIGRAM(S): 50 CAPSULE, EXTENDED RELEASE ORAL at 05:51

## 2022-11-01 RX ADMIN — ENOXAPARIN SODIUM 40 MILLIGRAM(S): 100 INJECTION SUBCUTANEOUS at 05:51

## 2022-11-01 RX ADMIN — Medication 30 MILLILITER(S): at 19:09

## 2022-11-01 RX ADMIN — SODIUM CHLORIDE 200 MILLILITER(S): 9 INJECTION, SOLUTION INTRAVENOUS at 05:51

## 2022-11-01 RX ADMIN — MORPHINE SULFATE 1 MILLIGRAM(S): 50 CAPSULE, EXTENDED RELEASE ORAL at 00:45

## 2022-11-01 RX ADMIN — MORPHINE SULFATE 1 MILLIGRAM(S): 50 CAPSULE, EXTENDED RELEASE ORAL at 08:50

## 2022-11-01 RX ADMIN — MORPHINE SULFATE 1 MILLIGRAM(S): 50 CAPSULE, EXTENDED RELEASE ORAL at 09:00

## 2022-11-01 RX ADMIN — MORPHINE SULFATE 2 MILLIGRAM(S): 50 CAPSULE, EXTENDED RELEASE ORAL at 16:07

## 2022-11-01 RX ADMIN — Medication 100 MILLIEQUIVALENT(S): at 12:13

## 2022-11-01 NOTE — PROGRESS NOTE ADULT - ASSESSMENT
Patient is a 77y old  Female from home, ambulates independently, with no significant PMHx, presents to the ER for evaluation of worsening abdominal pain x 3d. She states her abdominal pain started on Thursday, after taking new abx, amoxicillin  started on Monday after a recent dental removal. She describes abdominal pain to radiate to her back with sudden nausea and vomiting waking her up on Friday morning. She states that she has not been able to hold any food/ drink down. ON Admission, she found to have no fever but Leukocytosis. The CT abd/pelvis shows Acute Pancreatitis. The ID consult requested to assist with further evaluation and antibiotic management.     # Acute Pancreatitis- r/o Gallstone pancreatitis  # Leukocytosis - resolving   # Positive Urine culture- most likely a contaminant in the setting of Negative Urine analysis    would recommend:    1. Monitor oFF ABx   2. NPO, IVF and pain management  3. Monitor WBC count, started trending down   4. Abdominal US  since Gall bladder mildly distended    d/w Patient     Attending Attestation:    Spent more than 45 minutes on total encounter, more than 50 % of the visit was spent counseling and/or coordinating care by the Attending physician.   Patient is a 77y old  Female from home, ambulates independently, with no significant PMHx, presents to the ER for evaluation of worsening abdominal pain x 3d. She states her abdominal pain started on Thursday, after taking new abx, amoxicillin  started on Monday after a recent dental removal. She describes abdominal pain to radiate to her back with sudden nausea and vomiting waking her up on Friday morning. She states that she has not been able to hold any food/ drink down. ON Admission, she found to have no fever but Leukocytosis. The CT abd/pelvis shows Acute Pancreatitis. The ID consult requested to assist with further evaluation and antibiotic management.     # Acute Pancreatitis- most likely Gallstone pancreatitis- in the setting of Cholelithiasis seen on abdominal US from 11/1/22  # Leukocytosis - resolving   # Positive Urine culture- most likely a contaminant in the setting of Negative Urine analysis    would recommend:    1. Monitor WBC count, stay elevated   2. IVF and pain management  3. Advanced diet as tolerated   4. Follow up MRI of abdomen    Attending Attestation:    Spent more than 35 minutes on total encounter, more than 50 % of the visit was spent counseling and/or coordinating care by the Attending physician.

## 2022-11-01 NOTE — PROGRESS NOTE ADULT - ASSESSMENT
1. Abdominal pain  2. Acute pancreatitis  3. Gall stone  4. R/o CBD stone    Suggestions:    1. Clear liquid diet  2. Surgical evaluation  3. Protonix daily  4. Avoid NSAID  5. Pain control  6. MRI / MRCP  7. Follow up lipase  8. DVT prophylaxis

## 2022-11-01 NOTE — PROGRESS NOTE ADULT - SUBJECTIVE AND OBJECTIVE BOX
Patient is seen and examined at the bed side, is afebrile. She still has abdominal pain and stay NPO. The Leukocytosis is trending down.      REVIEW OF SYSTEMS: All other review systems are negative      ALLERGIES: No Known Allergies      Vital Signs Last 24 Hrs  T(C): 36.7 (2022 05:37), Max: 36.7 (31 Oct 2022 19:16)  T(F): 98.1 (2022 05:37), Max: 98.1 (31 Oct 2022 19:16)  HR: 77 (2022 05:37) (77 - 85)  BP: 165/78 (2022 05:37) (150/62 - 174/70)  BP(mean): --  RR: 20 (2022 05:37) (18 - 20)  SpO2: 93% (:37) (92% - 97%)    Parameters below as of 2022 05:37  Patient On (Oxygen Delivery Method): room air        PHYSICAL EXAM:  GENERAL: Not in distress   CHEST/LUNG: Not using accessory muscles   HEART: s1 and s2 present  ABDOMEN:  epigastric and right sided tenderness   EXTREMITIES: No pedal  edema  CNS: Awake and Alert      LABS:                        13.7   12.61 )-----------( 215      ( 2022 03:50 )             41.3                        14.7   16.07 )-----------( 184      ( 30 Oct 2022 03:20 )             44.3         11    144  |  113<H>  |  18  ----------------------------<  86  3.4<L>   |  26  |  0.43<L>    Ca    8.2<L>      2022 03:50  Phos  1.7       Mg     2.0         TPro  5.8<L>  /  Alb  2.2<L>  /  TBili  0.7  /  DBili  x   /  AST  16  /  ALT  25  /  AlkPhos  90  11-01    10-31    146<H>  |  111<H>  |  20<H>  ----------------------------<  93  3.5   |  25  |  0.58    Ca    8.6      31 Oct 2022 07:55  Phos  1.2     10-31  Mg     2.3     10-31    TPro  6.2  /  Alb  2.4<L>  /  TBili  0.7  /  DBili  x   /  AST  18  /  ALT  27  /  AlkPhos  88  10-31        Urinalysis Basic - ( 29 Oct 2022 14:40 )  Color: Raina / Appearance: Clear / S.025 / pH: x  Gluc: x / Ketone: Small  / Bili: Negative / Urobili: Negative   Blood: x / Protein: 100 / Nitrite: Negative   Leuk Esterase: Trace / RBC: 5-10 /HPF / WBC 0-2 /HPF   Sq Epi: x / Non Sq Epi: Negative /HPF / Bacteria: Negative /HPF        MEDICATIONS  (STANDING):    enoxaparin Injectable 40 milliGRAM(s) SubCutaneous every 24 hours  lactated ringers. 1000 milliLiter(s) (200 mL/Hr) IV Continuous <Continuous>      RADIOLOGY & ADDITIONAL TESTS:      10/29/22: CT Abdomen and Pelvis w/ IV Cont (10.29.22 @ 13:37) >Acute pancreatitis with focal area of decreased enhancement in the   proximal anterior pancreatic body.        MICROBIOLOGY DATA:    Culture - Urine (10.29.22 @ 14:40)   - Amikacin: S <=16   - Amoxicillin/Clavulanic Acid: S <=8/4   - Ampicillin: R >16 These ampicillin results predict results for amoxicillin   - Ampicillin/Sulbactam: S / Enterobacter, Klebsiella aerogenes, Citrobacter, and Serratia may develop resistance during prolonged therapy (3-4 days)   - Aztreonam: S <=4   - Cefazolin: S <=2 For uncomplicated UTI with K. pneumoniae, E. coli, or P. mirablis: JACINTO <=16 is sensitive and JACINTO >=32 is resistant. This also predicts results for oral agents cefaclor, cefdinir, cefpodoxime, cefprozil, cefuroxime axetil, cephalexin and locarbef for uncomplicated UTI. Note that some isolates may be susceptible to these agents while testing resistant to cefazolin.   - Cefepime: S <=2   - Cefoxitin: S <=8   - Ceftriaxone: S <=1 Enterobacter, Klebsiella aerogenes, Citrobacter, and Serratia may develop resistance during prolonged therapy   - Ciprofloxacin: S <=0.25   - Ertapenem: S <=0.5   - Gentamicin: S <=2   - Imipenem: S <=1   - Levofloxacin: S <=0.5   - Meropenem: S <=1   - Nitrofurantoin: I 64 Should not be used to treat pyelonephritis   - Piperacillin/Tazobactam: S <=8   - Tigecycline: S <=2   - Tobramycin: S <=2   - Trimethoprim/Sulfamethoxazole: S <=0.5/9.5   Specimen Source: Clean Catch Clean Catch (Midstream)   Culture Results:   10,000 - 49,000 CFU/mL Klebsiella pneumoniae   <10,000 CFU/ml Normal Urogenital vito present   Organism Identification: Klebsiella pneumoniae   Organism: Klebsiella pneumoniae   COVID-19 PCR . (10.29.22 @ 14:40)   COVID-19 PCR: NotDetec:                Patient is seen and examined at the bed side, is afebrile. The diet is advanced to Full Liquid. The abdominal US shows Cholelithiasis.      REVIEW OF SYSTEMS: All other review systems are negative      ALLERGIES: No Known Allergies      Vital Signs Last 24 Hrs  T(C): 36.7 (2022 05:37), Max: 36.7 (31 Oct 2022 19:16)  T(F): 98.1 (2022 05:37), Max: 98.1 (31 Oct 2022 19:16)  HR: 77 (2022 05:37) (77 - 85)  BP: 165/78 (2022 05:37) (150/62 - 174/70)  BP(mean): --  RR: 20 (:37) (18 - 20)  SpO2: 93% (:37) (92% - 97%)    Parameters below as of 2022 05:37  Patient On (Oxygen Delivery Method): room air        PHYSICAL EXAM:  GENERAL: Not in distress   CHEST/LUNG: Not using accessory muscles   HEART: s1 and s2 present  ABDOMEN:  epigastric and right sided tenderness improving   EXTREMITIES: No pedal  edema  CNS: Awake and Alert      LABS:                        13.7   12.61 )-----------( 215      ( 2022 03:50 )             41.3                        14.7   16.07 )-----------( 184      ( 30 Oct 2022 03:20 )             44.3         11    144  |  113<H>  |  18  ----------------------------<  86  3.4<L>   |  26  |  0.43<L>    Ca    8.2<L>      2022 03:50  Phos  1.7       Mg     2.0         TPro  5.8<L>  /  Alb  2.2<L>  /  TBili  0.7  /  DBili  x   /  AST  16  /  ALT  25  /  AlkPhos  90  11-01    10-31    146<H>  |  111<H>  |  20<H>  ----------------------------<  93  3.5   |  25  |  0.58    Ca    8.6      31 Oct 2022 07:55  Phos  1.2     10-31  Mg     2.3     10-31    TPro  6.2  /  Alb  2.4<L>  /  TBili  0.7  /  DBili  x   /  AST  18  /  ALT  27  /  AlkPhos  88  10-31        Urinalysis Basic - ( 29 Oct 2022 14:40 )  Color: Raina / Appearance: Clear / S.025 / pH: x  Gluc: x / Ketone: Small  / Bili: Negative / Urobili: Negative   Blood: x / Protein: 100 / Nitrite: Negative   Leuk Esterase: Trace / RBC: 5-10 /HPF / WBC 0-2 /HPF   Sq Epi: x / Non Sq Epi: Negative /HPF / Bacteria: Negative /HPF        MEDICATIONS  (STANDING):    enoxaparin Injectable 40 milliGRAM(s) SubCutaneous every 24 hours  lactated ringers. 1000 milliLiter(s) (200 mL/Hr) IV Continuous <Continuous>      RADIOLOGY & ADDITIONAL TESTS:    22: US Abdomen Upper Quadrant Right (22 @ 11:28) Cholelithiasis without evidence of thickened gallbladder wall,   pericholecystic fluid or ultrasonic Gonzalez's sign.    Mild peripancreatic fluid, consistent with patient's known acute pancreatitis.      10/29/22: CT Abdomen and Pelvis w/ IV Cont (10.29.22 @ 13:37) >Acute pancreatitis with focal area of decreased enhancement in the   proximal anterior pancreatic body.        MICROBIOLOGY DATA:    Culture - Urine (10.29.22 @ 14:40)   - Amikacin: S <=16   - Amoxicillin/Clavulanic Acid: S <=   - Ampicillin: R >16 These ampicillin results predict results for amoxicillin   - Ampicillin/Sulbactam: S  Enterobacter, Klebsiella aerogenes, Citrobacter, and Serratia may develop resistance during prolonged therapy (3-4 days)   - Aztreonam: S <=4   - Cefazolin: S <=2 For uncomplicated UTI with K. pneumoniae, E. coli, or P. mirablis: JACINTO <=16 is sensitive and JACINTO >=32 is resistant. This also predicts results for oral agents cefaclor, cefdinir, cefpodoxime, cefprozil, cefuroxime axetil, cephalexin and locarbef for uncomplicated UTI. Note that some isolates may be susceptible to these agents while testing resistant to cefazolin.   - Cefepime: S <=2   - Cefoxitin: S <=8   - Ceftriaxone: S <=1 Enterobacter, Klebsiella aerogenes, Citrobacter, and Serratia may develop resistance during prolonged therapy   - Ciprofloxacin: S <=0.25   - Ertapenem: S <=0.5   - Gentamicin: S <=2   - Imipenem: S <=1   - Levofloxacin: S <=0.5   - Meropenem: S <=1   - Nitrofurantoin: I 64 Should not be used to treat pyelonephritis   - Piperacillin/Tazobactam: S <=8   - Tigecycline: S <=2   - Tobramycin: S <=2   - Trimethoprim/Sulfamethoxazole: S <=0.5/9.5   Specimen Source: Clean Catch Clean Catch (Midstream)   Culture Results:   10,000 - 49,000 CFU/mL Klebsiella pneumoniae   <10,000 CFU/ml Normal Urogenital vito present   Organism Identification: Klebsiella pneumoniae   Organism: Klebsiella pneumoniae   COVID-19 PCR . (10.29.22 @ 14:40)   COVID-19 PCR: NotDetec:

## 2022-11-01 NOTE — PROGRESS NOTE ADULT - SUBJECTIVE AND OBJECTIVE BOX
[   ] ICU                                          [   ] CCU                                      [ X  ] Medical Floor      Patient is a 77 year old female with abdominal pain. GI consulted to evaluate.         77 year old female from home, ambulates independently, with out significant past medical history presented with 3 days history of progressively worsening sharp constant, 8/10 intensity epigastric abdominal pain radiating to her back associated with nausea, non bloody vomiting. Patient reports abdominal pain started after starting taking Amoxacillin for her recent dental removal. Patient denies hematemesis, hematochezia, melena, fever, chills, chest pain, SOB, cough, hematuria, dysuria, diarrhea or taking ETOH.    Patient is comfortable. No new complaints reported, No abdominal pain, N/V, hematemesis, hematochezia, melena, fever, chills, chest pain, SOB, cough or diarrhea reported.           PAIN MANAGEMENT:  Pain Scale:                5/10  Pain Location:  Epigastric abdominal pain      Prior Colonoscopy:  No prior colonoscopy      PAST MEDICAL HISTORY  No significant past medical history reported      PAST SURGICAL HISTORY  No significant surgical history reported      Allergies    No Known Allergies    Intolerances  None       SOCIAL HISTORY  Advanced Directives:       [ X ] Full Code       [  ] DNR  Marital Status:         [  ] M      [ X ] S      [  ] D       [  ] W  Children:       [ X ] Yes      [  ] No  Occupation:        [  ] Employed       [ X] Unemployed       [  ] Retired  Diet:       [ X ] Regular       [  ] PEG feeding          [  ] NG tube feeding  Drug Use:           [ X ] Patient denied          [  ] Yes  Alcohol:           [  X] No             [  ] Yes (socially)         [  ] Yes (chronic)  Tobacco:           [  ] Yes           [  X] No      FAMILY HISTORY  [ X ] Heart Disease            [ X ] Diabetes             [ X ] HTN             [  ] Colon Cancer             [  ] Stomach Cancer              [  ] Pancreatic Cancer      VITALS  Vital Signs Last 24 Hrs  T(C): 37.3 (01 Nov 2022 19:04), Max: 37.4 (01 Nov 2022 14:13)  T(F): 99.1 (01 Nov 2022 19:04), Max: 99.4 (01 Nov 2022 14:13)  HR: 80 (01 Nov 2022 19:04) (77 - 84)  BP: 165/74 (01 Nov 2022 19:04) (165/72 - 174/70)   RR: 18 (01 Nov 2022 19:04) (18 - 20)  SpO2: 93% (01 Nov 2022 19:04) (92% - 94%)  Parameters below as of 01 Nov 2022 19:04  Patient On (Oxygen Delivery Method): room air       MEDICATIONS  (STANDING):  enoxaparin Injectable 40 milliGRAM(s) SubCutaneous every 24 hours  lactated ringers. 1000 milliLiter(s) (200 mL/Hr) IV Continuous <Continuous>    MEDICATIONS  (PRN):  acetaminophen     Tablet .. 650 milliGRAM(s) Oral every 6 hours PRN Temp greater or equal to 38C (100.4F), Mild Pain (1 - 3)  aluminum hydroxide/magnesium hydroxide/simethicone Suspension 30 milliLiter(s) Oral every 4 hours PRN Dyspepsia  morphine  - Injectable 1 milliGRAM(s) IV Push every 6 hours PRN Moderate Pain (4 - 6)  morphine  - Injectable 2 milliGRAM(s) IV Push every 6 hours PRN Severe Pain (7 - 10)  ondansetron Injectable 4 milliGRAM(s) IV Push every 8 hours PRN Nausea and/or Vomiting                            13.7   12.61 )-----------( 215      ( 01 Nov 2022 03:50 )             41.3       11-01    144  |  113<H>  |  18  ----------------------------<  86  3.4<L>   |  26  |  0.43<L>    Ca    8.2<L>      01 Nov 2022 03:50  Phos  1.7     11-01  Mg     2.0     11-01    TPro  5.8<L>  /  Alb  2.2<L>  /  TBili  0.7  /  DBili  x   /  AST  16  /  ALT  25  /  AlkPhos  90  11-01      ACC: [id number] EXAM:  US ABDOMEN RT UPR QUADRANT                          PROCEDURE DATE:  11/01/2022          INTERPRETATION:  CLINICAL INFORMATION: Pancreatitis. Abdominal pain.    COMPARISON: No prior abdominal ultrasound is available for comparison.   Reference is made with a previous abdominal CT dated 10/29/2022    TECHNIQUE: Sonography of the right upper quadrant.    FINDINGS:  Liver: Mild hepatomegaly.  Bile ducts: Common bile duct measures 7 mm in caliber which is within   normal limitsfor age.  Gallbladder: Cholelithiasis without evidence of thickened gallbladder   wall, pericholecystic fluid or ultrasonic Gonzalez's sign.  Pancreas: Mild peripancreatic fluid, consistent with patient's known   acute pancreatitis.  Right kidney: 10.7cm. No hydronephrosis. 5.4 cm right renal cyst.  Ascites: None.  IVC: Visualized portions are within normal limits.    IMPRESSION:  Cholelithiasis without evidence of thickened gallbladder wall,   pericholecystic fluid or ultrasonic Gonzalez's sign.    Mild peripancreatic fluid, consistent with patient's known acute   pancreatitis.

## 2022-11-01 NOTE — PROGRESS NOTE ADULT - SUBJECTIVE AND OBJECTIVE BOX
PGY1 Progress Note discussed with attending     PAGER #: [[phone]] TILL 5:00 PM  PLEASE CONTACT ON CALL TEAM:  - On Call Team (Please refer to Elizabeth) FROM 5:00 PM - 8:30PM  - Nightfloat Team FROM 8:30 -7:30 AM    CHIEF COMPLAINT & BRIEF HOSPITAL COURSE:    INTERVAL HPI/OVERNIGHT EVENTS:       REVIEW OF SYSTEMS:  CONSTITUTIONAL: No fever, weight loss, or fatigue  RESPIRATORY: No cough, wheezing, chills or hemoptysis; No shortness of breath  CARDIOVASCULAR: No chest pain, palpitations, dizziness, or leg swelling  GASTROINTESTINAL: No abdominal pain. No nausea, vomiting, or hematemesis; No diarrhea or constipation. No melena or hematochezia.  GENITOURINARY: No dysuria or hematuria, urinary frequency  NEUROLOGICAL: No headaches, memory loss, loss of strength, numbness, or tremors  SKIN: No itching, burning, rashes, or lesions   MEDICATIONS  (STANDING):  enoxaparin Injectable 40 milliGRAM(s) SubCutaneous every 24 hours  lactated ringers. 1000 milliLiter(s) (200 mL/Hr) IV Continuous <Continuous>  potassium chloride  10 mEq/100 mL IVPB 10 milliEquivalent(s) IV Intermittent every 1 hour    MEDICATIONS  (PRN):  acetaminophen     Tablet .. 650 milliGRAM(s) Oral every 6 hours PRN Temp greater or equal to 38C (100.4F), Mild Pain (1 - 3)  aluminum hydroxide/magnesium hydroxide/simethicone Suspension 30 milliLiter(s) Oral every 4 hours PRN Dyspepsia  morphine  - Injectable 1 milliGRAM(s) IV Push every 6 hours PRN Moderate Pain (4 - 6)  morphine  - Injectable 2 milliGRAM(s) IV Push every 6 hours PRN Severe Pain (7 - 10)  ondansetron Injectable 4 milliGRAM(s) IV Push every 8 hours PRN Nausea and/or Vomiting    Vital Signs Last 24 Hrs  T(C): 36.7 (01 Nov 2022 05:37), Max: 36.7 (31 Oct 2022 14:05)  T(F): 98.1 (01 Nov 2022 05:37), Max: 98.1 (31 Oct 2022 19:16)  HR: 77 (01 Nov 2022 05:37) (76 - 85)  BP: 165/78 (01 Nov 2022 05:37) (150/62 - 174/70)  BP(mean): --  RR: 20 (01 Nov 2022 05:37) (18 - 20)  SpO2: 93% (01 Nov 2022 05:37) (92% - 97%)    Parameters below as of 01 Nov 2022 05:37  Patient On (Oxygen Delivery Method): room air        PHYSICAL EXAMINATION:  GENERAL: NAD, well built  HEAD:  Atraumatic, Normocephalic  EYES:  conjunctiva and sclera clear  NECK: Supple, No JVD, Normal thyroid  CHEST/LUNG: Clear to auscultation. Clear to percussion bilaterally; No rales, rhonchi, wheezing, or rubs  HEART: Regular rate and rhythm; No murmurs, rubs, or gallops  ABDOMEN: Soft, Nontender, Nondistended; Bowel sounds present  NERVOUS SYSTEM:  Alert & Oriented X3,    EXTREMITIES:  2+ Peripheral Pulses, No clubbing, cyanosis, or edema  SKIN: warm dry                          13.7   12.61 )-----------( 215      ( 01 Nov 2022 03:50 )             41.3     11-01    144  |  113<H>  |  18  ----------------------------<  86  3.4<L>   |  26  |  0.43<L>    Ca    8.2<L>      01 Nov 2022 03:50  Phos  1.7     11-01  Mg     2.0     11-01    TPro  5.8<L>  /  Alb  2.2<L>  /  TBili  0.7  /  DBili  x   /  AST  16  /  ALT  25  /  AlkPhos  90  11-01    LIVER FUNCTIONS - ( 01 Nov 2022 03:50 )  Alb: 2.2 g/dL / Pro: 5.8 g/dL / ALK PHOS: 90 U/L / ALT: 25 U/L DA / AST: 16 U/L / GGT: x                   CAPILLARY BLOOD GLUCOSE      RADIOLOGY & ADDITIONAL TESTS:                   PGY1 Progress Note discussed with attending     PAGER #: [[phone]] TILL 5:00 PM  PLEASE CONTACT ON CALL TEAM:  - On Call Team (Please refer to Elizabeth) FROM 5:00 PM - 8:30PM  - Nightfloat Team FROM 8:30 -7:30 AM    CHIEF COMPLAINT & BRIEF HOSPITAL COURSE:    INTERVAL HPI/OVERNIGHT EVENTS: No acute events noted overnight. Patient stating abdominal pain has improved. Denies fevers or chills. Denies nausea or vomiting.       REVIEW OF SYSTEMS:  CONSTITUTIONAL: No fever, weight loss, or fatigue  RESPIRATORY: No cough, wheezing, chills or hemoptysis; No shortness of breath  CARDIOVASCULAR: No chest pain, palpitations, dizziness, or leg swelling  GASTROINTESTINAL: Has abdominal pain, improving.  No nausea, vomiting, or hematemesis; No diarrhea or constipation. No melena or hematochezia.  GENITOURINARY: No dysuria or hematuria, urinary frequency  NEUROLOGICAL: No headaches, memory loss, loss of strength, numbness, or tremors  SKIN: No itching, burning, rashes, or lesions     MEDICATIONS  (STANDING):  enoxaparin Injectable 40 milliGRAM(s) SubCutaneous every 24 hours  lactated ringers. 1000 milliLiter(s) (200 mL/Hr) IV Continuous <Continuous>  potassium chloride  10 mEq/100 mL IVPB 10 milliEquivalent(s) IV Intermittent every 1 hour    MEDICATIONS  (PRN):  acetaminophen     Tablet .. 650 milliGRAM(s) Oral every 6 hours PRN Temp greater or equal to 38C (100.4F), Mild Pain (1 - 3)  aluminum hydroxide/magnesium hydroxide/simethicone Suspension 30 milliLiter(s) Oral every 4 hours PRN Dyspepsia  morphine  - Injectable 1 milliGRAM(s) IV Push every 6 hours PRN Moderate Pain (4 - 6)  morphine  - Injectable 2 milliGRAM(s) IV Push every 6 hours PRN Severe Pain (7 - 10)  ondansetron Injectable 4 milliGRAM(s) IV Push every 8 hours PRN Nausea and/or Vomiting    Vital Signs Last 24 Hrs  T(C): 36.7 (01 Nov 2022 05:37), Max: 36.7 (31 Oct 2022 14:05)  T(F): 98.1 (01 Nov 2022 05:37), Max: 98.1 (31 Oct 2022 19:16)  HR: 77 (01 Nov 2022 05:37) (76 - 85)  BP: 165/78 (01 Nov 2022 05:37) (150/62 - 174/70)  BP(mean): --  RR: 20 (01 Nov 2022 05:37) (18 - 20)  SpO2: 93% (01 Nov 2022 05:37) (92% - 97%)    Parameters below as of 01 Nov 2022 05:37  Patient On (Oxygen Delivery Method): room air        PHYSICAL EXAMINATION:  GENERAL: NAD, well built  HEAD:  Atraumatic, Normocephalic  EYES:  conjunctiva and sclera clear  NECK: Supple, No JVD, Normal thyroid  CHEST/LUNG: Clear to auscultation. Clear to percussion bilaterally; No rales, rhonchi, wheezing, or rubs  HEART: Regular rate and rhythm; No murmurs, rubs, or gallops  ABDOMEN: Soft, less tender in epigastric region, Nondistended; Bowel sounds present  NERVOUS SYSTEM:  Alert & Oriented X3,    EXTREMITIES:  2+ Peripheral Pulses, No clubbing, cyanosis, or edema  SKIN: warm dry                       13.7   12.61 )-----------( 215      ( 01 Nov 2022 03:50 )             41.3     11-01    144  |  113<H>  |  18  ----------------------------<  86  3.4<L>   |  26  |  0.43<L>    Ca    8.2<L>      01 Nov 2022 03:50  Phos  1.7     11-01  Mg     2.0     11-01    TPro  5.8<L>  /  Alb  2.2<L>  /  TBili  0.7  /  DBili  x   /  AST  16  /  ALT  25  /  AlkPhos  90  11-01    LIVER FUNCTIONS - ( 01 Nov 2022 03:50 )  Alb: 2.2 g/dL / Pro: 5.8 g/dL / ALK PHOS: 90 U/L / ALT: 25 U/L DA / AST: 16 U/L / GGT: x                   CAPILLARY BLOOD GLUCOSE      RADIOLOGY & ADDITIONAL TESTS:

## 2022-11-02 ENCOUNTER — TRANSCRIPTION ENCOUNTER (OUTPATIENT)
Age: 77
End: 2022-11-02

## 2022-11-02 LAB
ALBUMIN SERPL ELPH-MCNC: 2 G/DL — LOW (ref 3.5–5)
ALP SERPL-CCNC: 103 U/L — SIGNIFICANT CHANGE UP (ref 40–120)
ALT FLD-CCNC: 23 U/L DA — SIGNIFICANT CHANGE UP (ref 10–60)
ANION GAP SERPL CALC-SCNC: 7 MMOL/L — SIGNIFICANT CHANGE UP (ref 5–17)
AST SERPL-CCNC: 21 U/L — SIGNIFICANT CHANGE UP (ref 10–40)
BILIRUB SERPL-MCNC: 0.6 MG/DL — SIGNIFICANT CHANGE UP (ref 0.2–1.2)
BUN SERPL-MCNC: 17 MG/DL — SIGNIFICANT CHANGE UP (ref 7–18)
CALCIUM SERPL-MCNC: 8.5 MG/DL — SIGNIFICANT CHANGE UP (ref 8.4–10.5)
CHLORIDE SERPL-SCNC: 109 MMOL/L — HIGH (ref 96–108)
CO2 SERPL-SCNC: 24 MMOL/L — SIGNIFICANT CHANGE UP (ref 22–31)
CREAT SERPL-MCNC: 0.42 MG/DL — LOW (ref 0.5–1.3)
EGFR: 99 ML/MIN/1.73M2 — SIGNIFICANT CHANGE UP
GLUCOSE SERPL-MCNC: 74 MG/DL — SIGNIFICANT CHANGE UP (ref 70–99)
HCT VFR BLD CALC: 40 % — SIGNIFICANT CHANGE UP (ref 34.5–45)
HGB BLD-MCNC: 13.3 G/DL — SIGNIFICANT CHANGE UP (ref 11.5–15.5)
MAGNESIUM SERPL-MCNC: 2.3 MG/DL — SIGNIFICANT CHANGE UP (ref 1.6–2.6)
MCHC RBC-ENTMCNC: 28.9 PG — SIGNIFICANT CHANGE UP (ref 27–34)
MCHC RBC-ENTMCNC: 33.3 GM/DL — SIGNIFICANT CHANGE UP (ref 32–36)
MCV RBC AUTO: 87 FL — SIGNIFICANT CHANGE UP (ref 80–100)
NRBC # BLD: 0 /100 WBCS — SIGNIFICANT CHANGE UP (ref 0–0)
PHOSPHATE SERPL-MCNC: 2.4 MG/DL — LOW (ref 2.5–4.5)
PLATELET # BLD AUTO: 188 K/UL — SIGNIFICANT CHANGE UP (ref 150–400)
POTASSIUM SERPL-MCNC: 3.7 MMOL/L — SIGNIFICANT CHANGE UP (ref 3.5–5.3)
POTASSIUM SERPL-SCNC: 3.7 MMOL/L — SIGNIFICANT CHANGE UP (ref 3.5–5.3)
PROT SERPL-MCNC: 5.4 G/DL — LOW (ref 6–8.3)
RBC # BLD: 4.6 M/UL — SIGNIFICANT CHANGE UP (ref 3.8–5.2)
RBC # FLD: 12.9 % — SIGNIFICANT CHANGE UP (ref 10.3–14.5)
SODIUM SERPL-SCNC: 140 MMOL/L — SIGNIFICANT CHANGE UP (ref 135–145)
WBC # BLD: 12.29 K/UL — HIGH (ref 3.8–10.5)
WBC # FLD AUTO: 12.29 K/UL — HIGH (ref 3.8–10.5)

## 2022-11-02 PROCEDURE — 74183 MRI ABD W/O CNTR FLWD CNTR: CPT | Mod: 26

## 2022-11-02 RX ORDER — POTASSIUM PHOSPHATE, MONOBASIC POTASSIUM PHOSPHATE, DIBASIC 236; 224 MG/ML; MG/ML
30 INJECTION, SOLUTION INTRAVENOUS ONCE
Refills: 0 | Status: COMPLETED | OUTPATIENT
Start: 2022-11-02 | End: 2022-11-02

## 2022-11-02 RX ORDER — SODIUM CHLORIDE 9 MG/ML
1000 INJECTION, SOLUTION INTRAVENOUS
Refills: 0 | Status: DISCONTINUED | OUTPATIENT
Start: 2022-11-02 | End: 2022-11-06

## 2022-11-02 RX ADMIN — SODIUM CHLORIDE 200 MILLILITER(S): 9 INJECTION, SOLUTION INTRAVENOUS at 21:33

## 2022-11-02 RX ADMIN — ONDANSETRON 4 MILLIGRAM(S): 8 TABLET, FILM COATED ORAL at 00:20

## 2022-11-02 RX ADMIN — MORPHINE SULFATE 2 MILLIGRAM(S): 50 CAPSULE, EXTENDED RELEASE ORAL at 13:50

## 2022-11-02 RX ADMIN — MORPHINE SULFATE 2 MILLIGRAM(S): 50 CAPSULE, EXTENDED RELEASE ORAL at 20:09

## 2022-11-02 RX ADMIN — ONDANSETRON 4 MILLIGRAM(S): 8 TABLET, FILM COATED ORAL at 20:09

## 2022-11-02 RX ADMIN — MORPHINE SULFATE 2 MILLIGRAM(S): 50 CAPSULE, EXTENDED RELEASE ORAL at 00:48

## 2022-11-02 RX ADMIN — SODIUM CHLORIDE 200 MILLILITER(S): 9 INJECTION, SOLUTION INTRAVENOUS at 10:31

## 2022-11-02 RX ADMIN — MORPHINE SULFATE 1 MILLIGRAM(S): 50 CAPSULE, EXTENDED RELEASE ORAL at 08:28

## 2022-11-02 RX ADMIN — Medication 30 MILLILITER(S): at 20:01

## 2022-11-02 RX ADMIN — POTASSIUM PHOSPHATE, MONOBASIC POTASSIUM PHOSPHATE, DIBASIC 83.33 MILLIMOLE(S): 236; 224 INJECTION, SOLUTION INTRAVENOUS at 10:30

## 2022-11-02 RX ADMIN — MORPHINE SULFATE 2 MILLIGRAM(S): 50 CAPSULE, EXTENDED RELEASE ORAL at 14:15

## 2022-11-02 RX ADMIN — MORPHINE SULFATE 2 MILLIGRAM(S): 50 CAPSULE, EXTENDED RELEASE ORAL at 00:18

## 2022-11-02 RX ADMIN — MORPHINE SULFATE 2 MILLIGRAM(S): 50 CAPSULE, EXTENDED RELEASE ORAL at 20:39

## 2022-11-02 RX ADMIN — MORPHINE SULFATE 1 MILLIGRAM(S): 50 CAPSULE, EXTENDED RELEASE ORAL at 08:57

## 2022-11-02 RX ADMIN — ENOXAPARIN SODIUM 40 MILLIGRAM(S): 100 INJECTION SUBCUTANEOUS at 05:00

## 2022-11-02 NOTE — DISCHARGE NOTE PROVIDER - HOSPITAL COURSE
77F, coming from home, ambulates independently, with no significant PMHx p/w worsening abdominal pain x3d with associated n/v and food aversion. CT confirms pancreatitis. Patient being admitted for acute pancreatitis.     For Acute pancreatitis: CT a/p shows distended GB with evidence of acute pancreatitis, colonic diverticulosis, mild-moderate pelvic ascites, and no evidence of cholelithiasis  RUQ US noted showing Cholelithiasis without evidence of thickened gallbladder wall, pericholecystic fluid or ultrasonic Gonzalez's sign.  transition to full liquid diet as tolerated. Patient managed with NPO, IVF hydration and pain control. GI Consulted and recommended MRCP. MRCP was done showing......  Diet was advanced as tolerated.     Leukocytosis: Patient  p/w WBC >16k, afebrile. Antibiotics were held and  White blood cell count downtrended.     This is just a brief medical summary. For further details, please see medical chart. Case discussed with medical attending.      77F, coming from home, ambulates independently, with no significant PMHx p/w worsening abdominal pain x3d with associated n/v and food aversion. CT confirms pancreatitis. Patient being admitted for acute gallstone pancreatitis.     For Acute pancreatitis: CT a/p shows distended GB with evidence of acute pancreatitis, colonic diverticulosis, mild-moderate pelvic ascites, and no evidence of cholelithiasis  RUQ US noted showing Cholelithiasis without evidence of thickened gallbladder wall, pericholecystic fluid or ultrasonic Gonzalez's sign.  transition to full liquid diet as tolerated. Patient managed with NPO, IVF hydration and pain control. GI Consulted and recommended MRCP. MRCP was done showing The common bile duct measures up to 6 mm in caliber which is within normal limits. 5 mm distal common bile duct stone. Cholelithiasis. Peripancreatic edema, compatible with acute pancreatitis. 5.9 x 2.4 cm area of walled-off necrosis at the pancreatic body/tail. GI recommended ERCP which CBD was dilated to 11mm significant for choledocholithiasis in which distal gallstone was removed. Patient's diet was improved. For pancreatic necrosis, IV meropenem was started and patient will need Levofloxacin and Flagyl until 11/14/22. Patient would need to follow up as outpatient.       This is just a brief medical summary. For further details, please see medical chart. Case discussed with medical attending.

## 2022-11-02 NOTE — DISCHARGE NOTE PROVIDER - NSDCCPCAREPLAN_GEN_ALL_CORE_FT
PRINCIPAL DISCHARGE DIAGNOSIS  Diagnosis: Pancreatitis  Assessment and Plan of Treatment: You came into the hospital with abdominal pain.  CAT scan of the abdomen was done showing inflammation of the pancreas, called pancreatitis, with a gallbladder that was distended. You were admitted to the medical service. Diet was held, IV fluid started and pain control with morphine was initiated. An abdominal ultrasound was done showing no infection of the gallbladder. Gastroenterology was consulted and recommended a special scan called MRCP. MRCP was done showin...  PLEASE RETURN TO THE HOSPITAL IF YOU DEVELOP FEVERS, CHILLS OR WORSENING ABDOMINAL PAIN. PLEASE FOLLOW WITH YOUR PRIMARY CARE PROVIDER.      SECONDARY DISCHARGE DIAGNOSES  Diagnosis: Leukocytosis  Assessment and Plan of Treatment: Your white blood cell count was elevated and you did not have a fever. Antibiotics were held due to recommendations of Infectious disease doctors. Your white blood cell count improved. PLEASE FOLLOW WITH YOUR PRIMARY CARE PROVIDER.     PRINCIPAL DISCHARGE DIAGNOSIS  Diagnosis: Pancreatitis  Assessment and Plan of Treatment: You came into the hospital with abdominal pain.  CAT scan of the abdomen was done showing inflammation of the pancreas, called pancreatitis, with a gallbladder that was distended. You were admitted to the medical service. Diet was held, IV fluid started and pain control with morphine was initiated. An abdominal ultrasound was done showing no infection of the gallbladder. Gastroenterology was consulted and recommended a special scan called MRCP. MRCP was done showing gallstones and a dilated common bile duct, concerning for acute pancreatitis. It also showed an area of pancreatic necrosis or dying pancreatic tissue. You then underwent ERCP and this was when the gall stone was removed. Your diet was advanced.  For your pancreatic necrosis, IV antibiotics was started.  PLEASE TAKE LEVOFLOXACIN 750MG ONE TABLET A DAY AND FLAGYL 500MG ONE TABLET EVERY EIGHT HOURS UNTIL NOVEMBER 14, 2022.   PLEASE RETURN TO THE HOSPITAL IF YOU DEVELOP FEVERS, CHILLS OR WORSENING ABDOMINAL PAIN. PLEASE FOLLOW WITH YOUR PRIMARY CARE PROVIDER.      SECONDARY DISCHARGE DIAGNOSES  Diagnosis: Leukocytosis  Assessment and Plan of Treatment: Your white blood cell count was elevated and you did not have a fever. This was due to your dying pancreatic tissue. Antibiotics were started due to recommendations of Infectious disease doctors. Your white blood cell count improved. PLEASE TAKE LEVOFLOXACIN 750MG ONE TABLET A DAY AND FLAGYL 500MG ONE TABLET EVERY EIGHT HOURS UNTIL NOVEMBER 14, 2022.  PLEASE FOLLOW WITH YOUR PRIMARY CARE PROVIDER.     PRINCIPAL DISCHARGE DIAGNOSIS  Diagnosis: Pancreatitis  Assessment and Plan of Treatment: You came into the hospital with abdominal pain.  CAT scan of the abdomen was done showing inflammation of the pancreas, called pancreatitis, with a gallbladder that was distended. You were admitted to the medical service. Diet was held, IV fluid started and pain control with morphine was initiated. An abdominal ultrasound was done showing no infection of the gallbladder. Gastroenterology was consulted and recommended a special scan called MRCP. MRCP was done showing gallstones and a dilated common bile duct, concerning for acute pancreatitis. It also showed an area of pancreatic necrosis or dying pancreatic tissue. You then underwent ERCP and this was when the gall stone was removed. Your diet was advanced.  For your pancreatic necrosis, IV antibiotics was started.  PLEASE TAKE LEVOFLOXACIN 750MG ONE TABLET A DAY AND FLAGYL 500MG ONE TABLET EVERY EIGHT HOURS UNTIL NOVEMBER 14, 2022.   PLEASE RETURN TO THE HOSPITAL IF YOU DEVELOP FEVERS, CHILLS OR WORSENING ABDOMINAL PAIN. PLEASE FOLLOW WITH YOUR PRIMARY CARE PROVIDER.  YOU WILL NEED TO HAVE YOUR GALLBLADDER REMOVED. PLEASE FOLLOW WITH DR. LIZET CHAMPAGNE IN 6-8 WEEKS TO HAVE YOUR GALL BLADDER REMOVED TO PREVENT FURTHER EPISODES OF PANCREATITIS.      SECONDARY DISCHARGE DIAGNOSES  Diagnosis: Leukocytosis  Assessment and Plan of Treatment: Your white blood cell count was elevated and you did not have a fever. This was due to your dying pancreatic tissue. Antibiotics were started due to recommendations of Infectious disease doctors. Your white blood cell count improved. PLEASE TAKE LEVOFLOXACIN 750MG ONE TABLET A DAY AND FLAGYL 500MG ONE TABLET EVERY EIGHT HOURS UNTIL NOVEMBER 14, 2022.  PLEASE FOLLOW WITH YOUR PRIMARY CARE PROVIDER.

## 2022-11-02 NOTE — PROGRESS NOTE ADULT - ASSESSMENT
Patient is a 77y old  Female from home, ambulates independently, with no significant PMHx, presents to the ER for evaluation of worsening abdominal pain x 3d. She states her abdominal pain started on Thursday, after taking new abx, amoxicillin  started on Monday after a recent dental removal. She describes abdominal pain to radiate to her back with sudden nausea and vomiting waking her up on Friday morning. She states that she has not been able to hold any food/ drink down. ON Admission, she found to have no fever but Leukocytosis. The CT abd/pelvis shows Acute Pancreatitis. The ID consult requested to assist with further evaluation and antibiotic management.     # Acute Pancreatitis- most likely Gallstone pancreatitis- in the setting of Cholelithiasis seen on abdominal US from 11/1/22  # Leukocytosis - resolving   # Positive Urine culture- most likely a contaminant in the setting of Negative Urine analysis    would recommend:    1. Follow up abdominal MRI, result is pending  2. Monitor WBC count, stay elevated   3. IVF and pain management  4. OOB to chair    d/w patient and House staff, DR. Daniels    Attending Attestation:    Spent more than 35 minutes on total encounter, more than 50 % of the visit was spent counseling and/or coordinating care by the Attending physician.

## 2022-11-02 NOTE — PROGRESS NOTE ADULT - SUBJECTIVE AND OBJECTIVE BOX
PGY1 Progress Note discussed with attending     PAGER #: [[phone]] TILL 5:00 PM  PLEASE CONTACT ON CALL TEAM:  - On Call Team (Please refer to Elizabeth) FROM 5:00 PM - 8:30PM  - Nightfloat Team FROM 8:30 -7:30 AM    CHIEF COMPLAINT & BRIEF HOSPITAL COURSE:    INTERVAL HPI/OVERNIGHT EVENTS: No acute events noted overnight. Patient still complaining of abdominal pain, epigastric and b/l lower abdominal quadrants. Patient unable to tolerate liquid diet, requiring morphine overnight. Patient denies fevers or chills.       REVIEW OF SYSTEMS:  CONSTITUTIONAL: No fever, weight loss, or fatigue  RESPIRATORY: No cough, wheezing, chills or hemoptysis; No shortness of breath  CARDIOVASCULAR: No chest pain, palpitations, dizziness, or leg swelling  GASTROINTESTINAL: Epigastric abdominal pain. Has nausea no vomiting, or hematemesis; No diarrhea or constipation. No melena or hematochezia.  GENITOURINARY: No dysuria or hematuria, urinary frequency  NEUROLOGICAL: No headaches, memory loss, loss of strength, numbness, or tremors  SKIN: No itching, burning, rashes, or lesions   MEDICATIONS  (STANDING):  enoxaparin Injectable 40 milliGRAM(s) SubCutaneous every 24 hours  lactated ringers. 1000 milliLiter(s) (200 mL/Hr) IV Continuous <Continuous>    MEDICATIONS  (PRN):  acetaminophen     Tablet .. 650 milliGRAM(s) Oral every 6 hours PRN Temp greater or equal to 38C (100.4F), Mild Pain (1 - 3)  aluminum hydroxide/magnesium hydroxide/simethicone Suspension 30 milliLiter(s) Oral every 4 hours PRN Dyspepsia  morphine  - Injectable 1 milliGRAM(s) IV Push every 6 hours PRN Moderate Pain (4 - 6)  morphine  - Injectable 2 milliGRAM(s) IV Push every 6 hours PRN Severe Pain (7 - 10)  ondansetron Injectable 4 milliGRAM(s) IV Push every 8 hours PRN Nausea and/or Vomiting    Vital Signs Last 24 Hrs  T(C): 37.3 (02 Nov 2022 04:35), Max: 37.4 (01 Nov 2022 14:13)  T(F): 99.1 (02 Nov 2022 04:35), Max: 99.4 (01 Nov 2022 14:13)  HR: 79 (02 Nov 2022 04:35) (79 - 83)  BP: 155/83 (02 Nov 2022 04:35) (155/83 - 165/77)  BP(mean): --  RR: 18 (02 Nov 2022 04:35) (18 - 20)  SpO2: 93% (02 Nov 2022 04:35) (93% - 94%)    Parameters below as of 02 Nov 2022 04:35  Patient On (Oxygen Delivery Method): room air        PHYSICAL EXAMINATION:  GENERAL: NAD, well built  HEAD:  Atraumatic, Normocephalic  EYES:  conjunctiva and sclera clear  NECK: Supple, No JVD, Normal thyroid  CHEST/LUNG: Clear to auscultation. Clear to percussion bilaterally; No rales, rhonchi, wheezing, or rubs  HEART: Regular rate and rhythm; No murmurs, rubs, or gallops  ABDOMEN: Soft, less tender in epigastric region, Nondistended; Bowel sounds present  NERVOUS SYSTEM:  Alert & Oriented X3,    EXTREMITIES:  2+ Peripheral Pulses, No clubbing, cyanosis, or edema  SKIN: warm dry                                    13.3   12.29 )-----------( 188      ( 02 Nov 2022 07:15 )             40.0     11-02    140  |  109<H>  |  17  ----------------------------<  74  3.7   |  24  |  0.42<L>    Ca    8.5      02 Nov 2022 07:15  Phos  2.4     11-02  Mg     2.3     11-02    TPro  5.4<L>  /  Alb  2.0<L>  /  TBili  0.6  /  DBili  x   /  AST  21  /  ALT  23  /  AlkPhos  103  11-02    LIVER FUNCTIONS - ( 02 Nov 2022 07:15 )  Alb: 2.0 g/dL / Pro: 5.4 g/dL / ALK PHOS: 103 U/L / ALT: 23 U/L DA / AST: 21 U/L / GGT: x                   CAPILLARY BLOOD GLUCOSE      RADIOLOGY & ADDITIONAL TESTS:

## 2022-11-02 NOTE — DISCHARGE NOTE PROVIDER - CARE PROVIDER_API CALL
Aldo Gold (DO)  Surgery  [address] [facility] [address]  [place] [address]  Phone: [phone]  Fax: [phone]  Follow Up Time:

## 2022-11-02 NOTE — DISCHARGE NOTE PROVIDER - NSDCMRMEDTOKEN_GEN_ALL_CORE_FT
levoFLOXacin 750 mg oral tablet: 1 tab(s) orally once a day   metroNIDAZOLE 500 mg oral tablet: 1 tab(s) orally 3 times a day

## 2022-11-02 NOTE — PROGRESS NOTE ADULT - SUBJECTIVE AND OBJECTIVE BOX
[   ] ICU                                          [   ] CCU                                      [ X  ] Medical Floor      Patient is a 77 year old female with abdominal pain. GI consulted to evaluate.         77 year old female from home, ambulates independently, with out significant past medical history presented with 3 days history of progressively worsening sharp constant, 8/10 intensity epigastric abdominal pain radiating to her back associated with nausea, non bloody vomiting. Patient reports abdominal pain started after starting taking Amoxacillin for her recent dental removal. Patient denies hematemesis, hematochezia, melena, fever, chills, chest pain, SOB, cough, hematuria, dysuria, diarrhea or taking ETOH.    Patient is comfortable. No new complaints reported, No abdominal pain, N/V, hematemesis, hematochezia, melena, fever, chills, chest pain, SOB, cough or diarrhea reported.           PAIN MANAGEMENT:  Pain Scale:                5/10  Pain Location:  Epigastric abdominal pain      Prior Colonoscopy:  No prior colonoscopy      PAST MEDICAL HISTORY  No significant past medical history reported      PAST SURGICAL HISTORY  No significant surgical history reported      Allergies    No Known Allergies    Intolerances  None       SOCIAL HISTORY  Advanced Directives:       [ X ] Full Code       [  ] DNR  Marital Status:         [  ] M      [ X ] S      [  ] D       [  ] W  Children:       [ X ] Yes      [  ] No  Occupation:        [  ] Employed       [ X] Unemployed       [  ] Retired  Diet:       [ X ] Regular       [  ] PEG feeding          [  ] NG tube feeding  Drug Use:           [ X ] Patient denied          [  ] Yes  Alcohol:           [  X] No             [  ] Yes (socially)         [  ] Yes (chronic)  Tobacco:           [  ] Yes           [  X] No      FAMILY HISTORY  [ X ] Heart Disease            [ X ] Diabetes             [ X ] HTN             [  ] Colon Cancer             [  ] Stomach Cancer              [  ] Pancreatic Cancer      VITALS  Vital Signs Last 24 Hrs  T(C): 36.8 (02 Nov 2022 14:45), Max: 37.3 (01 Nov 2022 19:04)  T(F): 98.2 (02 Nov 2022 14:45), Max: 99.1 (01 Nov 2022 19:04)  HR: 77 (02 Nov 2022 14:45) (77 - 80)  BP: 149/73 (02 Nov 2022 14:45) (149/73 - 165/74)   RR: 18 (02 Nov 2022 14:45) (18 - 18)  SpO2: 93% (02 Nov 2022 14:45) (93% - 93%)  Parameters below as of 02 Nov 2022 14:45  Patient On (Oxygen Delivery Method): room air       MEDICATIONS  (STANDING):  enoxaparin Injectable 40 milliGRAM(s) SubCutaneous every 24 hours  lactated ringers. 1000 milliLiter(s) (200 mL/Hr) IV Continuous <Continuous>    MEDICATIONS  (PRN):  acetaminophen     Tablet .. 650 milliGRAM(s) Oral every 6 hours PRN Temp greater or equal to 38C (100.4F), Mild Pain (1 - 3)  aluminum hydroxide/magnesium hydroxide/simethicone Suspension 30 milliLiter(s) Oral every 4 hours PRN Dyspepsia  morphine  - Injectable 1 milliGRAM(s) IV Push every 6 hours PRN Moderate Pain (4 - 6)  morphine  - Injectable 2 milliGRAM(s) IV Push every 6 hours PRN Severe Pain (7 - 10)  ondansetron Injectable 4 milliGRAM(s) IV Push every 8 hours PRN Nausea and/or Vomiting                            13.3   12.29 )-----------( 188      ( 02 Nov 2022 07:15 )             40.0       11-02    140  |  109<H>  |  17  ----------------------------<  74  3.7   |  24  |  0.42<L>    Ca    8.5      02 Nov 2022 07:15  Phos  2.4     11-02  Mg     2.3     11-02    TPro  5.4<L>  /  Alb  2.0<L>  /  TBili  0.6  /  DBili  x   /  AST  21  /  ALT  23  /  AlkPhos  103  11-02

## 2022-11-02 NOTE — PROGRESS NOTE ADULT - SUBJECTIVE AND OBJECTIVE BOX
Patient is seen and examined at the bed side, is afebrile. She is still having abdominal pain. The MRI of abdomen is pending.       REVIEW OF SYSTEMS: All other review systems are negative      ALLERGIES: No Known Allergies      Vital Signs Last 24 Hrs  T(C): 36.8 (2022 14:45), Max: 37.3 (2022 19:04)  T(F): 98.2 (2022 14:45), Max: 99.1 (2022 19:04)  HR: 77 (2022 14:45) (77 - 80)  BP: 149/73 (2022 14:45) (149/73 - 165/74)  BP(mean): --  RR: 18 (2022 14:45) (18 - 18)  SpO2: 93% (2022 14:45) (93% - 93%)    Parameters below as of 2022 14:45  Patient On (Oxygen Delivery Method): room air        PHYSICAL EXAM:  GENERAL: Not in distress   CHEST/LUNG: Not using accessory muscles   HEART: s1 and s2 present  ABDOMEN:  epigastric and left sided tenderness positive  EXTREMITIES: No pedal  edema  CNS: Awake and Alert      LABS:                         13.3   12.29 )-----------( 188      ( 2022 07:15 )             40.0                         14.7   16.07 )-----------( 184      ( 30 Oct 2022 03:20 )             44.3       11-02    140  |  109<H>  |  17  ----------------------------<  74  3.7   |  24  |  0.42<L>    Ca    8.5      2022 07:15  Phos  2.4     11-  Mg     2.3         TPro  5.4<L>  /  Alb  2.0<L>  /  TBili  0.6  /  DBili  x   /  AST  21  /  ALT  23  /  AlkPhos  103  11-02    11-01    144  |  113<H>  |  18  ----------------------------<  86  3.4<L>   |  26  |  0.43<L>    Ca    8.2<L>      2022 03:50  Phos  1.7       Mg     2.0         TPro  5.8<L>  /  Alb  2.2<L>  /  TBili  0.7  /  DBili  x   /  AST  16  /  ALT  25  /  AlkPhos  90        Urinalysis Basic - ( 29 Oct 2022 14:40 )  Color: Raina / Appearance: Clear / S.025 / pH: x  Gluc: x / Ketone: Small  / Bili: Negative / Urobili: Negative   Blood: x / Protein: 100 / Nitrite: Negative   Leuk Esterase: Trace / RBC: 5-10 /HPF / WBC 0-2 /HPF   Sq Epi: x / Non Sq Epi: Negative /HPF / Bacteria: Negative /HPF        MEDICATIONS  (STANDING):  enoxaparin Injectable 40 milliGRAM(s) SubCutaneous every 24 hours  lactated ringers. 1000 milliLiter(s) (200 mL/Hr) IV Continuous <Continuous>    RADIOLOGY & ADDITIONAL TESTS:      22: US Abdomen Upper Quadrant Right (22 @ 11:28) Cholelithiasis without evidence of thickened gallbladder wall,   pericholecystic fluid or ultrasonic Gonzalez's sign.    Mild peripancreatic fluid, consistent with patient's known acute pancreatitis.      10/29/22: CT Abdomen and Pelvis w/ IV Cont (10.29.22 @ 13:37) >Acute pancreatitis with focal area of decreased enhancement in the   proximal anterior pancreatic body.        MICROBIOLOGY DATA:    Culture - Urine (10.29.22 @ 14:40)   - Amikacin: S <=16   - Amoxicillin/Clavulanic Acid: S <=8/4   - Ampicillin: R >16 These ampicillin results predict results for amoxicillin   - Ampicillin/Sulbactam: S / Enterobacter, Klebsiella aerogenes, Citrobacter, and Serratia may develop resistance during prolonged therapy (3-4 days)   - Aztreonam: S <=4   - Cefazolin: S <=2 For uncomplicated UTI with K. pneumoniae, E. coli, or P. mirablis: JACINTO <=16 is sensitive and JACINTO >=32 is resistant. This also predicts results for oral agents cefaclor, cefdinir, cefpodoxime, cefprozil, cefuroxime axetil, cephalexin and locarbef for uncomplicated UTI. Note that some isolates may be susceptible to these agents while testing resistant to cefazolin.   - Cefepime: S <=2   - Cefoxitin: S <=8   - Ceftriaxone: S <=1 Enterobacter, Klebsiella aerogenes, Citrobacter, and Serratia may develop resistance during prolonged therapy   - Ciprofloxacin: S <=0.25   - Ertapenem: S <=0.5   - Gentamicin: S <=2   - Imipenem: S <=1   - Levofloxacin: S <=0.5   - Meropenem: S <=1   - Nitrofurantoin: I 64 Should not be used to treat pyelonephritis   - Piperacillin/Tazobactam: S <=8   - Tigecycline: S <=2   - Tobramycin: S <=2   - Trimethoprim/Sulfamethoxazole: S <=0.5/9.5   Specimen Source: Clean Catch Clean Catch (Midstream)   Culture Results:   10,000 - 49,000 CFU/mL Klebsiella pneumoniae   <10,000 CFU/ml Normal Urogenital vito present   Organism Identification: Klebsiella pneumoniae   Organism: Klebsiella pneumoniae   COVID-19 PCR . (10.29.22 @ 14:40)   COVID-19 PCR: NotDetec:

## 2022-11-02 NOTE — PROGRESS NOTE ADULT - ASSESSMENT
77F, coming from home, ambulates independently, with no significant PMHx p/w worsening abdominal pain x3d with associated n/v and food aversion. CT confirms pancreatitis. Patient being admitted for acute pancreatitis.

## 2022-11-03 LAB
ALBUMIN SERPL ELPH-MCNC: 1.9 G/DL — LOW (ref 3.5–5)
ALP SERPL-CCNC: 111 U/L — SIGNIFICANT CHANGE UP (ref 40–120)
ALT FLD-CCNC: 21 U/L DA — SIGNIFICANT CHANGE UP (ref 10–60)
ANION GAP SERPL CALC-SCNC: 7 MMOL/L — SIGNIFICANT CHANGE UP (ref 5–17)
APTT BLD: 30.8 SEC — SIGNIFICANT CHANGE UP (ref 27.5–35.5)
AST SERPL-CCNC: 20 U/L — SIGNIFICANT CHANGE UP (ref 10–40)
BILIRUB SERPL-MCNC: 0.6 MG/DL — SIGNIFICANT CHANGE UP (ref 0.2–1.2)
BUN SERPL-MCNC: 11 MG/DL — SIGNIFICANT CHANGE UP (ref 7–18)
CALCIUM SERPL-MCNC: 8.4 MG/DL — SIGNIFICANT CHANGE UP (ref 8.4–10.5)
CHLORIDE SERPL-SCNC: 105 MMOL/L — SIGNIFICANT CHANGE UP (ref 96–108)
CO2 SERPL-SCNC: 29 MMOL/L — SIGNIFICANT CHANGE UP (ref 22–31)
CREAT SERPL-MCNC: 0.46 MG/DL — LOW (ref 0.5–1.3)
EGFR: 97 ML/MIN/1.73M2 — SIGNIFICANT CHANGE UP
GLUCOSE SERPL-MCNC: 93 MG/DL — SIGNIFICANT CHANGE UP (ref 70–99)
HCT VFR BLD CALC: 39.9 % — SIGNIFICANT CHANGE UP (ref 34.5–45)
HGB BLD-MCNC: 13.1 G/DL — SIGNIFICANT CHANGE UP (ref 11.5–15.5)
INR BLD: 1.52 RATIO — HIGH (ref 0.88–1.16)
MAGNESIUM SERPL-MCNC: 1.9 MG/DL — SIGNIFICANT CHANGE UP (ref 1.6–2.6)
MCHC RBC-ENTMCNC: 28.5 PG — SIGNIFICANT CHANGE UP (ref 27–34)
MCHC RBC-ENTMCNC: 32.8 GM/DL — SIGNIFICANT CHANGE UP (ref 32–36)
MCV RBC AUTO: 86.7 FL — SIGNIFICANT CHANGE UP (ref 80–100)
NRBC # BLD: 0 /100 WBCS — SIGNIFICANT CHANGE UP (ref 0–0)
PHOSPHATE SERPL-MCNC: 2.3 MG/DL — LOW (ref 2.5–4.5)
PLATELET # BLD AUTO: 206 K/UL — SIGNIFICANT CHANGE UP (ref 150–400)
POTASSIUM SERPL-MCNC: 3.8 MMOL/L — SIGNIFICANT CHANGE UP (ref 3.5–5.3)
POTASSIUM SERPL-SCNC: 3.8 MMOL/L — SIGNIFICANT CHANGE UP (ref 3.5–5.3)
PROT SERPL-MCNC: 5.2 G/DL — LOW (ref 6–8.3)
PROTHROM AB SERPL-ACNC: 18.2 SEC — HIGH (ref 10.5–13.4)
RBC # BLD: 4.6 M/UL — SIGNIFICANT CHANGE UP (ref 3.8–5.2)
RBC # FLD: 12.8 % — SIGNIFICANT CHANGE UP (ref 10.3–14.5)
SODIUM SERPL-SCNC: 141 MMOL/L — SIGNIFICANT CHANGE UP (ref 135–145)
WBC # BLD: 13.22 K/UL — HIGH (ref 3.8–10.5)
WBC # FLD AUTO: 13.22 K/UL — HIGH (ref 3.8–10.5)

## 2022-11-03 RX ORDER — MEROPENEM 1 G/30ML
INJECTION INTRAVENOUS
Refills: 0 | Status: DISCONTINUED | OUTPATIENT
Start: 2022-11-03 | End: 2022-11-09

## 2022-11-03 RX ORDER — POTASSIUM PHOSPHATE, MONOBASIC POTASSIUM PHOSPHATE, DIBASIC 236; 224 MG/ML; MG/ML
30 INJECTION, SOLUTION INTRAVENOUS ONCE
Refills: 0 | Status: COMPLETED | OUTPATIENT
Start: 2022-11-03 | End: 2022-11-03

## 2022-11-03 RX ORDER — MEROPENEM 1 G/30ML
1000 INJECTION INTRAVENOUS EVERY 8 HOURS
Refills: 0 | Status: DISCONTINUED | OUTPATIENT
Start: 2022-11-03 | End: 2022-11-09

## 2022-11-03 RX ORDER — MEROPENEM 1 G/30ML
1000 INJECTION INTRAVENOUS ONCE
Refills: 0 | Status: COMPLETED | OUTPATIENT
Start: 2022-11-03 | End: 2022-11-03

## 2022-11-03 RX ADMIN — MORPHINE SULFATE 2 MILLIGRAM(S): 50 CAPSULE, EXTENDED RELEASE ORAL at 07:10

## 2022-11-03 RX ADMIN — SODIUM CHLORIDE 200 MILLILITER(S): 9 INJECTION, SOLUTION INTRAVENOUS at 02:51

## 2022-11-03 RX ADMIN — POTASSIUM PHOSPHATE, MONOBASIC POTASSIUM PHOSPHATE, DIBASIC 83.33 MILLIMOLE(S): 236; 224 INJECTION, SOLUTION INTRAVENOUS at 12:54

## 2022-11-03 RX ADMIN — MEROPENEM 100 MILLIGRAM(S): 1 INJECTION INTRAVENOUS at 21:28

## 2022-11-03 RX ADMIN — MEROPENEM 100 MILLIGRAM(S): 1 INJECTION INTRAVENOUS at 18:10

## 2022-11-03 NOTE — CONSULT NOTE ADULT - ASSESSMENT
78 y/o female w/ gallstone pancreatitis (10/29/22)   VSS, afebrile, leukocytosis (13), Tbili and LFTs wnl    Plan  -GI to perform ERCP  -cholecystectomy on admission w/ possible IOC   -medicine as per primary team   76 y/o female w/ necrotizing pancreatitis, likely 2/2 gallstones     MRCP w/distal CBD stone, 5mm in the setting of normalized T bili, LFTs  VSS, afebrile, leukocytosis stable  epigastric TTP stable    Plan  -f/u with GI to eval for ERCP on this admission  -cholecystectomy likely as outpatient given necrosis of pancreas, will need to be reevaluated within few weeks   -diet as tolerated, encourage fluid intake with IVF support as appropriate  -will continue to follow, continue care per primary team  -recs discussed with Dr. Gold and covering MD resident

## 2022-11-03 NOTE — PROGRESS NOTE ADULT - SUBJECTIVE AND OBJECTIVE BOX
[   ] ICU                                          [   ] CCU                                      [ X  ] Medical Floor      Patient is a 77 year old female with abdominal pain. GI consulted to evaluate.         77 year old female from home, ambulates independently, with out significant past medical history presented with 3 days history of progressively worsening sharp constant, 8/10 intensity epigastric abdominal pain radiating to her back associated with nausea, non bloody vomiting. Patient reports abdominal pain started after starting taking Amoxacillin for her recent dental removal. Patient denies hematemesis, hematochezia, melena, fever, chills, chest pain, SOB, cough, hematuria, dysuria, diarrhea or taking ETOH.    Patient is comfortable. No new complaints reported, No abdominal pain, N/V, hematemesis, hematochezia, melena, fever, chills, chest pain, SOB, cough or diarrhea reported.           PAIN MANAGEMENT:  Pain Scale:                5/10  Pain Location:  Epigastric abdominal pain      Prior Colonoscopy:  No prior colonoscopy      PAST MEDICAL HISTORY  No significant past medical history reported      PAST SURGICAL HISTORY  No significant surgical history reported      Allergies    No Known Allergies    Intolerances  None       SOCIAL HISTORY  Advanced Directives:       [ X ] Full Code       [  ] DNR  Marital Status:         [  ] M      [ X ] S      [  ] D       [  ] W  Children:       [ X ] Yes      [  ] No  Occupation:        [  ] Employed       [ X] Unemployed       [  ] Retired  Diet:       [ X ] Regular       [  ] PEG feeding          [  ] NG tube feeding  Drug Use:           [ X ] Patient denied          [  ] Yes  Alcohol:           [  X] No             [  ] Yes (socially)         [  ] Yes (chronic)  Tobacco:           [  ] Yes           [  X] No      FAMILY HISTORY  [ X ] Heart Disease            [ X ] Diabetes             [ X ] HTN             [  ] Colon Cancer             [  ] Stomach Cancer              [  ] Pancreatic Cancer        VITALS  Vital Signs Last 24 Hrs  T(C): 37.3 (03 Nov 2022 13:56), Max: 37.3 (03 Nov 2022 13:56)  T(F): 99.2 (03 Nov 2022 13:56), Max: 99.2 (03 Nov 2022 13:56)  HR: 75 (03 Nov 2022 13:56) (75 - 80)  BP: 151/76 (03 Nov 2022 13:56) (151/76 - 153/64)   RR: 17 (03 Nov 2022 13:56) (17 - 18)  SpO2: 97% (03 Nov 2022 13:56) (95% - 97%)  Parameters below as of 03 Nov 2022 13:56  Patient On (Oxygen Delivery Method): room air       MEDICATIONS  (STANDING):  lactated ringers. 1000 milliLiter(s) (200 mL/Hr) IV Continuous <Continuous>  meropenem  IVPB      meropenem  IVPB 1000 milliGRAM(s) IV Intermittent every 8 hours    MEDICATIONS  (PRN):  acetaminophen     Tablet .. 650 milliGRAM(s) Oral every 6 hours PRN Temp greater or equal to 38C (100.4F), Mild Pain (1 - 3)  aluminum hydroxide/magnesium hydroxide/simethicone Suspension 30 milliLiter(s) Oral every 4 hours PRN Dyspepsia  morphine  - Injectable 1 milliGRAM(s) IV Push every 6 hours PRN Moderate Pain (4 - 6)  morphine  - Injectable 2 milliGRAM(s) IV Push every 6 hours PRN Severe Pain (7 - 10)  ondansetron Injectable 4 milliGRAM(s) IV Push every 8 hours PRN Nausea and/or Vomiting                            13.1   13.22 )-----------( 206      ( 03 Nov 2022 08:23 )             39.9       11-03    141  |  105  |  11  ----------------------------<  93  3.8   |  29  |  0.46<L>    Ca    8.4      03 Nov 2022 08:23  Phos  2.3     11-03  Mg     1.9     11-03    TPro  5.2<L>  /  Alb  1.9<L>  /  TBili  0.6  /  DBili  x   /  AST  20  /  ALT  21  /  AlkPhos  111  11-03    PT/INR - ( 03 Nov 2022 08:23 )   PT: 18.2 sec;   INR: 1.52 ratio       PTT - ( 03 Nov 2022 08:23 )  PTT:30.8 sec      ACC: [id number] EXAM:  MR MRCP [facility]                          PROCEDURE DATE:  11/02/2022          INTERPRETATION:  CLINICAL INFORMATION: Acute pancreatitis    COMPARISON: No prior abdominal MR is available for comparison. Reference   is made with aprevious abdominal CT dated 10/29/2022.    CONTRAST/COMPLICATIONS:  IV Contrast: Gadavist  6 cc administered   4 cc discarded  Oral Contrast: NONE  Complications: None reported at time of study completion    PROCEDURE:  MRI of the abdomen was performed.  MRCP was performed.    FINDINGS:  LOWER CHEST: Mild bilateral pleural effusions.    LIVER: 2 small left hepatic cysts measuring up to 2.0 cm.  BILE DUCTS: Normal caliber. The common bile duct measures up to 6 mm in   caliber which is within normal limits. 5 mm distal common bile duct stone   (image 63 series 13).  GALLBLADDER: Small gallstones in the gallbladder. No evidence for   thickened gallbladder wall or pericholecystic fluid.  SPLEEN: Within normal limits.  PANCREAS: Peripancreatic edema, compatible with acute pancreatitis. 5.9 x   2.4 cm area of walled-off necrosis at the pancreatic body/tail.  ADRENALS: Within normal limits.  KIDNEYS/URETERS: Bilateral renal cysts measuring up to 4.2 cm on the   right.    VISUALIZED PORTIONS:  BOWEL: Colonic diverticulosis  PERITONEUM: Small ascites.  VESSELS: Within normal limits.  RETROPERITONEUM/LYMPH NODES: No lymphadenopathy.  ABDOMINAL WALL: Within normal limits.  BONES: Within normal limits.    IMPRESSION:  The common bile duct measures up to 6 mm in caliber which is within   normal limits. 5 mm distal common bile duct stone.    Cholelithiasis.    Peripancreatic edema, compatible with acute pancreatitis. 5.9 x 2.4 cm   area of walled-off necrosis at the pancreatic body/tail.    Small ascites.    Mild bilateral pleural effusions.

## 2022-11-03 NOTE — CONSULT NOTE ADULT - SUBJECTIVE AND OBJECTIVE BOX
HPI:  77F with no significant PMHx p/w worsening abdominal pain x 3d. She describes abdominal pain to radiate to her back with sudden nausea and vomiting waking her up on Friday morning. She states that she has not been able to hold any food/ drink down, reporting food aversion since then. Denies any prior episodes of this happening. Patient denies fevers, chills, or recent illness. Patient denies HA, chest pain, SOB, abdominal pain, and changes in BM and urination.    SURGICAL CONSULT  76 y/o female with no significant pmh admitted to medicine service with abdominal pain and elevated lipase on 10/29/2022. CT scan demonstrated acute pancreatitis. F/u US found cholelithiasis with no signs of acute cholecystitis. GI consult recommended MRCP which demonstrated 5mm distal common bile duct stone. General surgery was consulted to see and examine patient at bedside. Pt admits to epigastric and RUQ pain radiating to the back, and loss of appetite. Denies nausea, vomiting, change in bowel habits, fevers, chills.       PAST MEDICAL & SURGICAL HISTORY:  No pertinent past medical history      MEDICATIONS  (STANDING):  enoxaparin Injectable 40 milliGRAM(s) SubCutaneous every 24 hours  lactated ringers. 1000 milliLiter(s) (200 mL/Hr) IV Continuous <Continuous>    MEDICATIONS  (PRN):  acetaminophen     Tablet .. 650 milliGRAM(s) Oral every 6 hours PRN Temp greater or equal to 38C (100.4F), Mild Pain (1 - 3)  aluminum hydroxide/magnesium hydroxide/simethicone Suspension 30 milliLiter(s) Oral every 4 hours PRN Dyspepsia  morphine  - Injectable 1 milliGRAM(s) IV Push every 6 hours PRN Moderate Pain (4 - 6)  morphine  - Injectable 2 milliGRAM(s) IV Push every 6 hours PRN Severe Pain (7 - 10)  ondansetron Injectable 4 milliGRAM(s) IV Push every 8 hours PRN Nausea and/or Vomiting    Allergies    No Known Allergies    Intolerances    Vital Signs Last 24 Hrs  T(C): 37.3 (03 Nov 2022 13:56), Max: 37.3 (03 Nov 2022 13:56)  T(F): 99.2 (03 Nov 2022 13:56), Max: 99.2 (03 Nov 2022 13:56)  HR: 75 (03 Nov 2022 13:56) (75 - 80)  BP: 151/76 (03 Nov 2022 13:56) (149/73 - 153/64)  BP(mean): --  RR: 17 (03 Nov 2022 13:56) (17 - 20)  SpO2: 97% (03 Nov 2022 13:56) (93% - 97%)    Parameters below as of 03 Nov 2022 13:56  Patient On (Oxygen Delivery Method): room air      Physical:  Gen: A&Ox3. NAD  Respiratory: Unlabored breathing.  Equal chest rise and fall bilaterally.  Abd: Soft ND, tenderness to palpation in the epigastric region. Negative Gonzalez's sign. No palpable masses, no voluntary guarding.   Extremities: No calf tenderness.    I&O's Detail      LABS:                        13.1   13.22 )-----------( 206      ( 03 Nov 2022 08:23 )             39.9              11-03    141  |  105  |  11  ----------------------------<  93  3.8   |  29  |  0.46<L>    Ca    8.4      03 Nov 2022 08:23  Phos  2.3     11-03  Mg     1.9     11-03    TPro  5.2<L>  /  Alb  1.9<L>  /  TBili  0.6  /  DBili  x   /  AST  20  /  ALT  21  /  AlkPhos  111  11-03            PT/INR - ( 03 Nov 2022 08:23 )   PT: 18.2 sec;   INR: 1.52 ratio         PTT - ( 03 Nov 2022 08:23 )  PTT:30.8 sec      RADIOLOGY & ADDITIONAL STUDIES:  < from: CT Abdomen and Pelvis w/ IV Cont (10.29.22 @ 13:37) >  INTERPRETATION:  CLINICAL INFORMATION: Lower abdominal pain    FINDINGS:  LOWER CHEST: Chronic changes in the lung bases with additional linear   atelectasis and/or scarring.    LIVER: Hepatic cyst adjacent to the gallbladder fossa. Trace perihepatic   ascites.  BILE DUCTS: Common bile duct measures up to 8 mm. No definite common bile   duct calculus delineated on current study, correlate with bilirubin   levels.  GALLBLADDER: Gallbladder is mildly distended.  SPLEEN: Within normal limits.  PANCREAS: Edema the pancreatic body and tail with focal area of marked   decreased enhancement ofthe proximal pancreatic body with peripancreatic   infiltrative changes and peripancreatic fluid extending inferiorly along   the bilateral paracolic gutters compatible with acute pancreatitis.  ADRENALS: Within normal limits.  KIDNEYS/URETERS: No hydronephrosis bilaterally. Anterior exophytic right   mid to lower pole cyst. Additional tiny hypodensities bilaterally to   small to characterize. No intrarenal calculi. The ureters are   unremarkable.    BLADDER: Within normal limits.  REPRODUCTIVE ORGANS: Mild to moderate pelvic ascites. Uterus and adnexa   are unremarkable.    BOWEL: No bowel obstruction. Appendix is normal. Colonic diverticulosis   without definite evidence of acute diverticulitis.  PERITONEUM: No ascites.  VESSELS: Atherosclerotic changes.  RETROPERITONEUM/LYMPH NODES: No lymphadenopathy.  ABDOMINAL WALL: Tiny fat-containing umbilical hernia.  BONES: Degenerative changes.    IMPRESSION:  Acute pancreatitis with focal area of decreased enhancement in the   proximal anterior pancreatic body.    < from: US Abdomen Upper Quadrant Right (11.01.22 @ 11:28) >    IMPRESSION:  Cholelithiasis without evidence of thickened gallbladder wall,   pericholecystic fluid or ultrasonic Gonzalez's sign.    Mild peripancreatic fluid, consistent with patient's known acute   pancreatitis.    < from: MR MRCP w/wo IV Cont (11.02.22 @ 13:10) >    PROCEDURE:  MRI of the abdomen was performed.  MRCP was performed.    FINDINGS:  LOWER CHEST: Mild bilateral pleural effusions.    LIVER: 2 small left hepatic cysts measuring up to 2.0 cm.  BILE DUCTS: Normal caliber. The common bile duct measures up to 6 mm in   caliber which is within normal limits. 5 mm distal common bile duct stone   (image 63 series 13).  GALLBLADDER: Small gallstones in the gallbladder. No evidence for   thickened gallbladder wall or pericholecystic fluid.  SPLEEN: Within normal limits.  PANCREAS: Peripancreatic edema, compatible with acute pancreatitis. 5.9 x   2.4 cm area of walled-off necrosis at the pancreatic body/tail.  ADRENALS: Within normal limits.  KIDNEYS/URETERS: Bilateral renal cysts measuring up to 4.2 cm on the   right.    VISUALIZED PORTIONS:  BOWEL: Colonic diverticulosis  PERITONEUM: Small ascites.  VESSELS: Within normal limits.  RETROPERITONEUM/LYMPH NODES: No lymphadenopathy.  ABDOMINAL WALL: Within normal limits.  BONES: Within normal limits.    IMPRESSION:  The common bile duct measures up to 6 mm in caliber which is within   normal limits. 5 mm distal common bile duct stone.    Cholelithiasis.    Peripancreatic edema, compatible with acute pancreatitis. 5.9 x 2.4 cm   area of walled-off necrosis at the pancreatic body/tail.    Small ascites.    Mild bilateral pleural effusions.    --- End of Report ---      < end of copied text > HPI:  77F with no significant PMHx p/w worsening abdominal pain x 3d. She describes abdominal pain to radiate to her back with sudden nausea and vomiting waking her up on Friday morning. She states that she has not been able to hold any food/ drink down, reporting food aversion since then. Denies any prior episodes of this happening. Patient denies fevers, chills, or recent illness. Patient denies HA, chest pain, SOB, abdominal pain, and changes in BM and urination.  Work up revealed elevated lipase, 5K and findings c/w acute pancreatitis likely 2/2 cholelithiasis. GI consulted, rec'ed MRCP.     SURGICAL CONSULT  General surgery was consulted to assess cholelithiasis and potential lap brendon on this admission. Interval MRCP showed distal CBD stone with necrosis on body of pancreas. At the time of exam, pt admits to epigastric and RUQ pain radiating to the back, and loss of appetite. Notably ~20lbs of weight loss for past few months,  negative, no h/o malignancy. Denies nausea, vomiting, change in bowel habits, fevers, chills.       PAST MEDICAL & SURGICAL HISTORY:  No pertinent past medical history      MEDICATIONS  (STANDING):  enoxaparin Injectable 40 milliGRAM(s) SubCutaneous every 24 hours  lactated ringers. 1000 milliLiter(s) (200 mL/Hr) IV Continuous <Continuous>    MEDICATIONS  (PRN):  acetaminophen     Tablet .. 650 milliGRAM(s) Oral every 6 hours PRN Temp greater or equal to 38C (100.4F), Mild Pain (1 - 3)  aluminum hydroxide/magnesium hydroxide/simethicone Suspension 30 milliLiter(s) Oral every 4 hours PRN Dyspepsia  morphine  - Injectable 1 milliGRAM(s) IV Push every 6 hours PRN Moderate Pain (4 - 6)  morphine  - Injectable 2 milliGRAM(s) IV Push every 6 hours PRN Severe Pain (7 - 10)  ondansetron Injectable 4 milliGRAM(s) IV Push every 8 hours PRN Nausea and/or Vomiting    Allergies    No Known Allergies    Intolerances    Vital Signs Last 24 Hrs  T(C): 37.3 (03 Nov 2022 13:56), Max: 37.3 (03 Nov 2022 13:56)  T(F): 99.2 (03 Nov 2022 13:56), Max: 99.2 (03 Nov 2022 13:56)  HR: 75 (03 Nov 2022 13:56) (75 - 80)  BP: 151/76 (03 Nov 2022 13:56) (149/73 - 153/64)  BP(mean): --  RR: 17 (03 Nov 2022 13:56) (17 - 20)  SpO2: 97% (03 Nov 2022 13:56) (93% - 97%)    Parameters below as of 03 Nov 2022 13:56  Patient On (Oxygen Delivery Method): room air      Physical:  Gen: A&Ox3. NAD  Respiratory: Unlabored breathing.  Equal chest rise and fall bilaterally.  Abd: Soft ND, tenderness to palpation in the epigastric region. Negative Gonzalez's sign. No palpable masses, no voluntary guarding.   Extremities: No calf tenderness.    I&O's Detail      LABS:                        13.1   13.22 )-----------( 206      ( 03 Nov 2022 08:23 )             39.9              11-03    141  |  105  |  11  ----------------------------<  93  3.8   |  29  |  0.46<L>    Ca    8.4      03 Nov 2022 08:23  Phos  2.3     11-03  Mg     1.9     11-03    TPro  5.2<L>  /  Alb  1.9<L>  /  TBili  0.6  /  DBili  x   /  AST  20  /  ALT  21  /  AlkPhos  111  11-03            PT/INR - ( 03 Nov 2022 08:23 )   PT: 18.2 sec;   INR: 1.52 ratio         PTT - ( 03 Nov 2022 08:23 )  PTT:30.8 sec      RADIOLOGY & ADDITIONAL STUDIES:  < from: CT Abdomen and Pelvis w/ IV Cont (10.29.22 @ 13:37) >  INTERPRETATION:  CLINICAL INFORMATION: Lower abdominal pain    FINDINGS:  LOWER CHEST: Chronic changes in the lung bases with additional linear   atelectasis and/or scarring.    LIVER: Hepatic cyst adjacent to the gallbladder fossa. Trace perihepatic   ascites.  BILE DUCTS: Common bile duct measures up to 8 mm. No definite common bile   duct calculus delineated on current study, correlate with bilirubin   levels.  GALLBLADDER: Gallbladder is mildly distended.  SPLEEN: Within normal limits.  PANCREAS: Edema the pancreatic body and tail with focal area of marked   decreased enhancement ofthe proximal pancreatic body with peripancreatic   infiltrative changes and peripancreatic fluid extending inferiorly along   the bilateral paracolic gutters compatible with acute pancreatitis.  ADRENALS: Within normal limits.  KIDNEYS/URETERS: No hydronephrosis bilaterally. Anterior exophytic right   mid to lower pole cyst. Additional tiny hypodensities bilaterally to   small to characterize. No intrarenal calculi. The ureters are   unremarkable.    BLADDER: Within normal limits.  REPRODUCTIVE ORGANS: Mild to moderate pelvic ascites. Uterus and adnexa   are unremarkable.    BOWEL: No bowel obstruction. Appendix is normal. Colonic diverticulosis   without definite evidence of acute diverticulitis.  PERITONEUM: No ascites.  VESSELS: Atherosclerotic changes.  RETROPERITONEUM/LYMPH NODES: No lymphadenopathy.  ABDOMINAL WALL: Tiny fat-containing umbilical hernia.  BONES: Degenerative changes.    IMPRESSION:  Acute pancreatitis with focal area of decreased enhancement in the   proximal anterior pancreatic body.    < from: US Abdomen Upper Quadrant Right (11.01.22 @ 11:28) >    IMPRESSION:  Cholelithiasis without evidence of thickened gallbladder wall,   pericholecystic fluid or ultrasonic Gonzalez's sign.    Mild peripancreatic fluid, consistent with patient's known acute   pancreatitis.    < from: MR MRCP w/wo IV Cont (11.02.22 @ 13:10) >    PROCEDURE:  MRI of the abdomen was performed.  MRCP was performed.    FINDINGS:  LOWER CHEST: Mild bilateral pleural effusions.    LIVER: 2 small left hepatic cysts measuring up to 2.0 cm.  BILE DUCTS: Normal caliber. The common bile duct measures up to 6 mm in   caliber which is within normal limits. 5 mm distal common bile duct stone   (image 63 series 13).  GALLBLADDER: Small gallstones in the gallbladder. No evidence for   thickened gallbladder wall or pericholecystic fluid.  SPLEEN: Within normal limits.  PANCREAS: Peripancreatic edema, compatible with acute pancreatitis. 5.9 x   2.4 cm area of walled-off necrosis at the pancreatic body/tail.  ADRENALS: Within normal limits.  KIDNEYS/URETERS: Bilateral renal cysts measuring up to 4.2 cm on the   right.    VISUALIZED PORTIONS:  BOWEL: Colonic diverticulosis  PERITONEUM: Small ascites.  VESSELS: Within normal limits.  RETROPERITONEUM/LYMPH NODES: No lymphadenopathy.  ABDOMINAL WALL: Within normal limits.  BONES: Within normal limits.    IMPRESSION:  The common bile duct measures up to 6 mm in caliber which is within   normal limits. 5 mm distal common bile duct stone.    Cholelithiasis.    Peripancreatic edema, compatible with acute pancreatitis. 5.9 x 2.4 cm   area of walled-off necrosis at the pancreatic body/tail.    Small ascites.    Mild bilateral pleural effusions.    --- End of Report ---      < end of copied text >

## 2022-11-03 NOTE — PROGRESS NOTE ADULT - ASSESSMENT
1. Abdominal pain  2. Acute pancreatitis  3. Gall stone  4. R/o CBD stone    Suggestions:    1. Diet as tolerated  2. Surgical evaluation  3. Protonix daily  4. Avoid NSAID  5. Pain control  6. DVT prophylaxis

## 2022-11-03 NOTE — CHART NOTE - NSCHARTNOTEFT_GEN_A_CORE
Updated by Surgical team patient will need ERCP, GI Dr. Victoria contacted for need for ERCP, awaiting response. Will make patient NPO after midnight in case ERCP is tomorrow.

## 2022-11-03 NOTE — PROGRESS NOTE ADULT - SUBJECTIVE AND OBJECTIVE BOX
PGY1 Progress Note discussed with attending     PAGER #: [phone]] TILL 5:00 PM  PLEASE CONTACT ON CALL TEAM:  - On Call Team (Please refer to Elizabeth) FROM 5:00 PM - 8:30PM  - Nightfloat Team FROM 8:30 -7:30 AM    CHIEF COMPLAINT & BRIEF HOSPITAL COURSE:    INTERVAL HPI/OVERNIGHT EVENTS: No acute events noted overnight. Patient had MRCP done showing gall stone in distal common bile duct and no dilatation of the CBD, with area of necrotic pancreas. Patient still has epigastric abdominal pain associated with nausea, tolerating clear liquid diet. Patient denies cp, sob or palpitations, denies emesis.       REVIEW OF SYSTEMS:  CONSTITUTIONAL: No fever, weight loss, or fatigue  RESPIRATORY: No cough, wheezing, chills or hemoptysis; No shortness of breath  CARDIOVASCULAR: No chest pain, palpitations, dizziness, or leg swelling  GASTROINTESTINAL: Has abdominal pain and nausea. No vomiting, or hematemesis; No diarrhea or constipation. No melena or hematochezia.  GENITOURINARY: No dysuria or hematuria, urinary frequency  NEUROLOGICAL: No headaches, memory loss, loss of strength, numbness, or tremors  SKIN: No itching, burning, rashes, or lesions     MEDICATIONS  (STANDING):  enoxaparin Injectable 40 milliGRAM(s) SubCutaneous every 24 hours  lactated ringers. 1000 milliLiter(s) (200 mL/Hr) IV Continuous <Continuous>    MEDICATIONS  (PRN):  acetaminophen     Tablet .. 650 milliGRAM(s) Oral every 6 hours PRN Temp greater or equal to 38C (100.4F), Mild Pain (1 - 3)  aluminum hydroxide/magnesium hydroxide/simethicone Suspension 30 milliLiter(s) Oral every 4 hours PRN Dyspepsia  morphine  - Injectable 1 milliGRAM(s) IV Push every 6 hours PRN Moderate Pain (4 - 6)  morphine  - Injectable 2 milliGRAM(s) IV Push every 6 hours PRN Severe Pain (7 - 10)  ondansetron Injectable 4 milliGRAM(s) IV Push every 8 hours PRN Nausea and/or Vomiting    Vital Signs Last 24 Hrs  T(C): 36.9 (03 Nov 2022 05:37), Max: 36.9 (03 Nov 2022 05:37)  T(F): 98.5 (03 Nov 2022 05:37), Max: 98.5 (03 Nov 2022 05:37)  HR: 80 (03 Nov 2022 05:37) (77 - 80)  BP: 153/64 (03 Nov 2022 05:37) (149/73 - 153/64)  BP(mean): --  RR: 18 (03 Nov 2022 05:37) (18 - 20)  SpO2: 95% (03 Nov 2022 05:37) (93% - 95%)    Parameters below as of 03 Nov 2022 05:37  Patient On (Oxygen Delivery Method): room air        PHYSICAL EXAMINATION:  GENERAL: NAD, well built  HEAD:  Atraumatic, Normocephalic  EYES:  conjunctiva and sclera clear  NECK: Supple, No JVD, Normal thyroid  CHEST/LUNG: Clear to auscultation. Clear to percussion bilaterally; No rales, rhonchi, wheezing, or rubs  HEART: Regular rate and rhythm; No murmurs, rubs, or gallops  ABDOMEN: Soft, less tender in epigastric region, Nondistended; Bowel sounds present  NERVOUS SYSTEM:  Alert & Oriented X3,    EXTREMITIES:  2+ Peripheral Pulses, No clubbing, cyanosis, or edema  SKIN: warm dry                                13.1   13.22 )-----------( 206      ( 03 Nov 2022 08:23 )             39.9     11-03    141  |  105  |  11  ----------------------------<  93  3.8   |  29  |  0.46<L>    Ca    8.4      03 Nov 2022 08:23  Phos  2.3     11-03  Mg     1.9     11-03    TPro  5.2<L>  /  Alb  1.9<L>  /  TBili  0.6  /  DBili  x   /  AST  20  /  ALT  21  /  AlkPhos  111  11-03    LIVER FUNCTIONS - ( 03 Nov 2022 08:23 )  Alb: 1.9 g/dL / Pro: 5.2 g/dL / ALK PHOS: 111 U/L / ALT: 21 U/L DA / AST: 20 U/L / GGT: x               PT/INR - ( 03 Nov 2022 08:23 )   PT: 18.2 sec;   INR: 1.52 ratio         PTT - ( 03 Nov 2022 08:23 )  PTT:30.8 sec    CAPILLARY BLOOD GLUCOSE      RADIOLOGY & ADDITIONAL TESTS:

## 2022-11-03 NOTE — PROGRESS NOTE ADULT - SUBJECTIVE AND OBJECTIVE BOX
Patient is seen and examined at the bed side, is afebrile. The MRCP from  has been reviewed. The plan for ERCP.       REVIEW OF SYSTEMS: All other review systems are negative      ALLERGIES: No Known Allergies      Vital Signs Last 24 Hrs  T(C): 37.3 (2022 13:56), Max: 37.3 (2022 13:56)  T(F): 99.2 (2022 13:56), Max: 99.2 (2022 13:56)  HR: 75 (2022 13:56) (75 - 80)  BP: 151/76 (2022 13:56) (150/77 - 153/64)  BP(mean): --  RR: 17 (2022 13:56) (17 - 20)  SpO2: 97% (2022 13:56) (94% - 97%)    Parameters below as of 2022 13:56  Patient On (Oxygen Delivery Method): room air      PHYSICAL EXAM:  GENERAL: Not in distress   CHEST/LUNG: Not using accessory muscles   HEART: s1 and s2 present  ABDOMEN:  epigastric and left sided tenderness positive  EXTREMITIES: No pedal  edema  CNS: Awake and Alert      LABS:                         13.1   13.22 )-----------( 206      ( 2022 08:23 )             39.9                         14.7   16.07 )-----------( 184      ( 30 Oct 2022 03:20 )             44.3           141  |  105  |  11  ----------------------------<  93  3.8   |  29  |  0.46<L>    Ca    8.4      2022 08:23  Phos  2.3       Mg     1.9         TPro  5.2<L>  /  Alb  1.9<L>  /  TBili  0.6  /  DBili  x   /  AST  20  /  ALT  21  /  AlkPhos  111  02    140  |  109<H>  |  17  ----------------------------<  74  3.7   |  24  |  0.42<L>    Ca    8.5      2022 07:15  Phos  2.4       Mg     2.3         TPro  5.4<L>  /  Alb  2.0<L>  /  TBili  0.6  /  DBili  x   /  AST  21  /  ALT  23  /  AlkPhos  103        Urinalysis Basic - ( 29 Oct 2022 14:40 )  Color: Raina / Appearance: Clear / S.025 / pH: x  Gluc: x / Ketone: Small  / Bili: Negative / Urobili: Negative   Blood: x / Protein: 100 / Nitrite: Negative   Leuk Esterase: Trace / RBC: 5-10 /HPF / WBC 0-2 /HPF   Sq Epi: x / Non Sq Epi: Negative /HPF / Bacteria: Negative /HPF        MEDICATIONS  (STANDING):    lactated ringers. 1000 milliLiter(s) (200 mL/Hr) IV Continuous <Continuous>  meropenem  IVPB      meropenem  IVPB 1000 milliGRAM(s) IV Intermittent once  meropenem  IVPB 1000 milliGRAM(s) IV Intermittent every 8 hours    RADIOLOGY & ADDITIONAL TESTS:    22: MR MRCP w/wo IV Cont (22 @ 13:10) The common bile duct measures up to 6 mm in caliber which is within   normal limits. 5 mm distal common bile duct stone. Cholelithiasis.    Peripancreatic edema, compatible with acute pancreatitis. 5.9 x 2.4 cm  area of walled-off necrosis at the pancreatic body/tail.    Small ascites.  Mild bilateral pleural effusions.      22: US Abdomen Upper Quadrant Right (22 @ 11:28) Cholelithiasis without evidence of thickened gallbladder wall,   pericholecystic fluid or ultrasonic Gonzalez's sign.    Mild peripancreatic fluid, consistent with patient's known acute pancreatitis.      10/29/22: CT Abdomen and Pelvis w/ IV Cont (10.29.22 @ 13:37) >Acute pancreatitis with focal area of decreased enhancement in the   proximal anterior pancreatic body.        MICROBIOLOGY DATA:    Culture - Urine (10.29.22 @ 14:40)   - Amikacin: S <=16   - Amoxicillin/Clavulanic Acid: S <=8/4   - Ampicillin: R >16 These ampicillin results predict results for amoxicillin   - Ampicillin/Sulbactam: S 8/ Enterobacter, Klebsiella aerogenes, Citrobacter, and Serratia may develop resistance during prolonged therapy (3-4 days)   - Aztreonam: S <=4   - Cefazolin: S <=2 For uncomplicated UTI with K. pneumoniae, E. coli, or P. mirablis: JACINTO <=16 is sensitive and JACINTO >=32 is resistant. This also predicts results for oral agents cefaclor, cefdinir, cefpodoxime, cefprozil, cefuroxime axetil, cephalexin and locarbef for uncomplicated UTI. Note that some isolates may be susceptible to these agents while testing resistant to cefazolin.   - Cefepime: S <=2   - Cefoxitin: S <=8   - Ceftriaxone: S <=1 Enterobacter, Klebsiella aerogenes, Citrobacter, and Serratia may develop resistance during prolonged therapy   - Ciprofloxacin: S <=0.25   - Ertapenem: S <=0.5   - Gentamicin: S <=2   - Imipenem: S <=1   - Levofloxacin: S <=0.5   - Meropenem: S <=1   - Nitrofurantoin: I 64 Should not be used to treat pyelonephritis   - Piperacillin/Tazobactam: S <=8   - Tigecycline: S <=2   - Tobramycin: S <=2   - Trimethoprim/Sulfamethoxazole: S <=0.5/9.5   Specimen Source: Clean Catch Clean Catch (Midstream)   Culture Results:   10,000 - 49,000 CFU/mL Klebsiella pneumoniae   <10,000 CFU/ml Normal Urogenital vito present   Organism Identification: Klebsiella pneumoniae   Organism: Klebsiella pneumoniae   COVID-19 PCR . (10.29.22 @ 14:40)   COVID-19 PCR: NotDetec:

## 2022-11-03 NOTE — PROGRESS NOTE ADULT - ASSESSMENT
Patient is a 77y old  Female from home, ambulates independently, with no significant PMHx, presents to the ER for evaluation of worsening abdominal pain x 3d. She states her abdominal pain started on Thursday, after taking new abx, amoxicillin  started on Monday after a recent dental removal. She describes abdominal pain to radiate to her back with sudden nausea and vomiting waking her up on Friday morning. She states that she has not been able to hold any food/ drink down. ON Admission, she found to have no fever but Leukocytosis. The CT abd/pelvis shows Acute Pancreatitis. The ID consult requested to assist with further evaluation and antibiotic management.     # Acute Pancreatitis- most likely Gallstone pancreatitis- in the setting of Cholelithiasis seen on abdominal US from 11/1/22- MRCP as of 11/2 shows Peripancreatic edema, compatible with acute pancreatitis. 5.9 x 2.4 cm  area of walled-off necrosis at the pancreatic body/tail. Cholelithiasis  and  5 mm distal common bile duct stone.   # Leukocytosis - resolving   # Positive Urine culture- most likely a contaminant in the setting of Negative Urine analysis    would recommend:    1. Agree with ERCP since MRCP shows  5 mm distal common bile duct stone.   2. start Meropenem since MRCP shows area of walled-off necrosis at the pancreatic body/tail.  3. Monitor WBC count, stay elevated   4. IVF and pain management  5. May benefit from cholecystectomy after ERCP    d/w patient and House staff, DR. Daniels    Attending Attestation:    Spent more than 35 minutes on total encounter, more than 50 % of the visit was spent counseling and/or coordinating care by the Attending physician.

## 2022-11-04 LAB
ALBUMIN SERPL ELPH-MCNC: 1.8 G/DL — LOW (ref 3.5–5)
ALP SERPL-CCNC: 115 U/L — SIGNIFICANT CHANGE UP (ref 40–120)
ALT FLD-CCNC: 21 U/L DA — SIGNIFICANT CHANGE UP (ref 10–60)
ANION GAP SERPL CALC-SCNC: 13 MMOL/L — SIGNIFICANT CHANGE UP (ref 5–17)
APTT BLD: 30 SEC — SIGNIFICANT CHANGE UP (ref 27.5–35.5)
AST SERPL-CCNC: 16 U/L — SIGNIFICANT CHANGE UP (ref 10–40)
BILIRUB SERPL-MCNC: 0.6 MG/DL — SIGNIFICANT CHANGE UP (ref 0.2–1.2)
BLD GP AB SCN SERPL QL: SIGNIFICANT CHANGE UP
BUN SERPL-MCNC: 11 MG/DL — SIGNIFICANT CHANGE UP (ref 7–18)
CALCIUM SERPL-MCNC: 8.4 MG/DL — SIGNIFICANT CHANGE UP (ref 8.4–10.5)
CHLORIDE SERPL-SCNC: 105 MMOL/L — SIGNIFICANT CHANGE UP (ref 96–108)
CO2 SERPL-SCNC: 23 MMOL/L — SIGNIFICANT CHANGE UP (ref 22–31)
CREAT SERPL-MCNC: 0.46 MG/DL — LOW (ref 0.5–1.3)
EGFR: 97 ML/MIN/1.73M2 — SIGNIFICANT CHANGE UP
GLUCOSE SERPL-MCNC: 62 MG/DL — LOW (ref 70–99)
HCT VFR BLD CALC: 38.8 % — SIGNIFICANT CHANGE UP (ref 34.5–45)
HGB BLD-MCNC: 12.7 G/DL — SIGNIFICANT CHANGE UP (ref 11.5–15.5)
INR BLD: 1.53 RATIO — HIGH (ref 0.88–1.16)
MAGNESIUM SERPL-MCNC: 2.3 MG/DL — SIGNIFICANT CHANGE UP (ref 1.6–2.6)
MCHC RBC-ENTMCNC: 28.5 PG — SIGNIFICANT CHANGE UP (ref 27–34)
MCHC RBC-ENTMCNC: 32.7 GM/DL — SIGNIFICANT CHANGE UP (ref 32–36)
MCV RBC AUTO: 87 FL — SIGNIFICANT CHANGE UP (ref 80–100)
NRBC # BLD: 0 /100 WBCS — SIGNIFICANT CHANGE UP (ref 0–0)
PHOSPHATE SERPL-MCNC: 2.7 MG/DL — SIGNIFICANT CHANGE UP (ref 2.5–4.5)
PLATELET # BLD AUTO: 227 K/UL — SIGNIFICANT CHANGE UP (ref 150–400)
POTASSIUM SERPL-MCNC: 3.8 MMOL/L — SIGNIFICANT CHANGE UP (ref 3.5–5.3)
POTASSIUM SERPL-SCNC: 3.8 MMOL/L — SIGNIFICANT CHANGE UP (ref 3.5–5.3)
PROT SERPL-MCNC: 5.3 G/DL — LOW (ref 6–8.3)
PROTHROM AB SERPL-ACNC: 18.3 SEC — HIGH (ref 10.5–13.4)
RBC # BLD: 4.46 M/UL — SIGNIFICANT CHANGE UP (ref 3.8–5.2)
RBC # FLD: 12.7 % — SIGNIFICANT CHANGE UP (ref 10.3–14.5)
SODIUM SERPL-SCNC: 141 MMOL/L — SIGNIFICANT CHANGE UP (ref 135–145)
WBC # BLD: 12.14 K/UL — HIGH (ref 3.8–10.5)
WBC # FLD AUTO: 12.14 K/UL — HIGH (ref 3.8–10.5)

## 2022-11-04 PROCEDURE — 99222 1ST HOSP IP/OBS MODERATE 55: CPT

## 2022-11-04 RX ADMIN — Medication 650 MILLIGRAM(S): at 08:30

## 2022-11-04 RX ADMIN — MEROPENEM 100 MILLIGRAM(S): 1 INJECTION INTRAVENOUS at 18:31

## 2022-11-04 RX ADMIN — Medication 650 MILLIGRAM(S): at 23:41

## 2022-11-04 RX ADMIN — Medication 650 MILLIGRAM(S): at 07:51

## 2022-11-04 RX ADMIN — Medication 650 MILLIGRAM(S): at 22:19

## 2022-11-04 RX ADMIN — MEROPENEM 100 MILLIGRAM(S): 1 INJECTION INTRAVENOUS at 22:28

## 2022-11-04 RX ADMIN — MEROPENEM 100 MILLIGRAM(S): 1 INJECTION INTRAVENOUS at 05:45

## 2022-11-04 NOTE — PROGRESS NOTE ADULT - ASSESSMENT
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 can plan for outpatient intervention in 6-8 weeks after inflammation improves  Continue IVF hydration, monitor UOP, I/Os  Defer to GI for timing of ERCP in setting of pancreatitis   Will follow along

## 2022-11-04 NOTE — CONSULT NOTE ADULT - NS ATTEND AMEND GEN_ALL_CORE FT
- Necrotizing pancreaittis.  - Choledocholithiasis.  - Cholelithiasis.  - Abd pain.  - Walled off necrosis.    Patient seen and examined. Appears comfortable. Pain much improved. Abd soft, minimally tender. MRCP reviewed, choledocholithiasis seen. LFTs however normal so not obstructing at this time. Also with sign of walled off necrosis in the pancreatic body. Given patient overall improving with minimal leukocytosis, no fevers, hold off on any endoscopic or IR drainage of pancreatic collection. Plan for ERCP tentatively Monday to remove stone. Clear lqiuid diet ok in the mean time. Trend LFTs. NPO after midnight Sunday.
Date of service 11/3    This is a 77F with PMHx as listed above, admitted to the medical service for abdominal pain. She reports pain had been going on since Friday last week. She reports having multiple prior episodes, ~6 in the last year. Pain is mostly located in her epigastrium, radiating laterally, and to the back. She had seen a GI as an outpatient who did CT scan and EGD in the last year which was reportedly normal. She denies that pain is brought on by food or any other inciting events.     Currently, she is admitted with a diagnosis of gallstone pancreatitis and being managed by the medical service.    Vitals reviewed, WNL  Labs reviewed, WBC remains elevated at 13.2; bilirubin normal, AST/ALT WNL, INR WNL    Imaging personally reviewed:  CT ab/pel showing pancreatitis with necrosis of the body  RUQ US: gallstones, no cholecystitis   MRCP confirms pancreatitis with 5.9x2.4cm area of walled off necrosis in body/tail; 6mm CBD with distal 5mm CBD stone noted    A/P  Given her overall picture, the presence of choledocholithiasis as well as gallstone pancreatitis with walled off necrosis, laparoscopic cholecystectomy in the acute setting is very high risk  Would continue conservative treatment and plan for elective cholecystectomy in 6-8 weeks after giving time for resolution of inflammation. I explained to the patient that operating in the acute setting is very high risk and can lead to complications, however there is a ~25% recurrence rate of gallstone pancreatitis as well. As such, I feel the risk of recurrence outweighs the benefit of surgery at this time and would elect to not perform cholecystectomy in this acute phase  The patient understands the above and agrees  would recommend GI eval for ERCP and stone extraction to relieve CBD obstruction  Continue Aggressive IVF hydration, please strictly monitor I/O    Thank you for involving us in this patient's care, we will continue to follow along

## 2022-11-04 NOTE — PROGRESS NOTE ADULT - SUBJECTIVE AND OBJECTIVE BOX
Patient is seen and examined at the bed side, is afebrile. She is c/o headache and dizziness. The plan for ERCP on Monday.      REVIEW OF SYSTEMS: All other review systems are negative      ALLERGIES: No Known Allergies      Vital Signs Last 24 Hrs  T(C): 37 (2022 13:08), Max: 37 (2022 13:08)  T(F): 98.6 (2022 13:08), Max: 98.6 (2022 13:08)  HR: 78 (2022 17:24) (70 - 78)  BP: 159/74 (2022 17:24) (158/73 - 166/79)  BP(mean): --  RR: 18 (:24) (15 - 18)  SpO2: 94% (2022 17:24) (94% - 96%)    Parameters below as of 2022 17:24  Patient On (Oxygen Delivery Method): room air      PHYSICAL EXAM:  GENERAL: Not in distress   CHEST/LUNG: Not using accessory muscles   HEART: s1 and s2 present  ABDOMEN:  epigastric and left sided tenderness positive  EXTREMITIES: No pedal  edema  CNS: Awake and Alert      LABS:                         12.7   12.14 )-----------( 227      ( 2022 08:10 )             38.8                           13.1   13.22 )-----------( 206      ( 2022 08:23 )             39.9                            141  |  105  |  11  ----------------------------<  62<L>  3.8   |  23  |  0.46<L>    Ca    8.4      2022 08:10  Phos  2.7     -  Mg     2.3         TPro  5.3<L>  /  Alb  1.8<L>  /  TBili  0.6  /  DBili  x   /  AST  16  /  ALT  21  /  AlkPhos  115  -03    141  |  105  |  11  ----------------------------<  93  3.8   |  29  |  0.46<L>    Ca    8.4      2022 08:23  Phos  2.3       Mg     1.9         TPro  5.2<L>  /  Alb  1.9<L>  /  TBili  0.6  /  DBili  x   /  AST  20  /  ALT  21  /  AlkPhos  111        Urinalysis Basic - ( 29 Oct 2022 14:40 )  Color: Raina / Appearance: Clear / S.025 / pH: x  Gluc: x / Ketone: Small  / Bili: Negative / Urobili: Negative   Blood: x / Protein: 100 / Nitrite: Negative   Leuk Esterase: Trace / RBC: 5-10 /HPF / WBC 0-2 /HPF   Sq Epi: x / Non Sq Epi: Negative /HPF / Bacteria: Negative /HPF        MEDICATIONS  (STANDING):      lactated ringers. 1000 milliLiter(s) (200 mL/Hr) IV Continuous <Continuous>  meropenem  IVPB      meropenem  IVPB 1000 milliGRAM(s) IV Intermittent every 8 hours      RADIOLOGY & ADDITIONAL TESTS:    22: MR MRCP w/wo IV Cont (22 @ 13:10) The common bile duct measures up to 6 mm in caliber which is within   normal limits. 5 mm distal common bile duct stone. Cholelithiasis.    Peripancreatic edema, compatible with acute pancreatitis. 5.9 x 2.4 cm  area of walled-off necrosis at the pancreatic body/tail.    Small ascites.  Mild bilateral pleural effusions.      22: US Abdomen Upper Quadrant Right (22 @ 11:28) Cholelithiasis without evidence of thickened gallbladder wall,   pericholecystic fluid or ultrasonic Gonzalez's sign.    Mild peripancreatic fluid, consistent with patient's known acute pancreatitis.      10/29/22: CT Abdomen and Pelvis w/ IV Cont (10.29.22 @ 13:37) >Acute pancreatitis with focal area of decreased enhancement in the   proximal anterior pancreatic body.        MICROBIOLOGY DATA:    Culture - Urine (10.29.22 @ 14:40)   - Amikacin: S <=16   - Amoxicillin/Clavulanic Acid: S <=8/4   - Ampicillin: R >16 These ampicillin results predict results for amoxicillin   - Ampicillin/Sulbactam: S 8/ Enterobacter, Klebsiella aerogenes, Citrobacter, and Serratia may develop resistance during prolonged therapy (3-4 days)   - Aztreonam: S <=4   - Cefazolin: S <=2 For uncomplicated UTI with K. pneumoniae, E. coli, or P. mirablis: JACINTO <=16 is sensitive and JACINTO >=32 is resistant. This also predicts results for oral agents cefaclor, cefdinir, cefpodoxime, cefprozil, cefuroxime axetil, cephalexin and locarbef for uncomplicated UTI. Note that some isolates may be susceptible to these agents while testing resistant to cefazolin.   - Cefepime: S <=2   - Cefoxitin: S <=8   - Ceftriaxone: S <=1 Enterobacter, Klebsiella aerogenes, Citrobacter, and Serratia may develop resistance during prolonged therapy   - Ciprofloxacin: S <=0.25   - Ertapenem: S <=0.5   - Gentamicin: S <=2   - Imipenem: S <=1   - Levofloxacin: S <=0.5   - Meropenem: S <=1   - Nitrofurantoin: I 64 Should not be used to treat pyelonephritis   - Piperacillin/Tazobactam: S <=8   - Tigecycline: S <=2   - Tobramycin: S <=2   - Trimethoprim/Sulfamethoxazole: S <=0.5/9.5   Specimen Source: Clean Catch Clean Catch (Midstream)   Culture Results:   10,000 - 49,000 CFU/mL Klebsiella pneumoniae   <10,000 CFU/ml Normal Urogenital vito present   Organism Identification: Klebsiella pneumoniae   Organism: Klebsiella pneumoniae   COVID-19 PCR . (10.29.22 @ 14:40)   COVID-19 PCR: NotDetec:

## 2022-11-04 NOTE — PROGRESS NOTE ADULT - SUBJECTIVE AND OBJECTIVE BOX
PGY1 Progress Note discussed with attending     PAGER #: [phone]] TILL 5:00 PM  PLEASE CONTACT ON CALL TEAM:  - On Call Team (Please refer to Elizabeth) FROM 5:00 PM - 8:30PM  - Nightfloat Team FROM 8:30 -7:30 AM    CHIEF COMPLAINT & BRIEF HOSPITAL COURSE:    INTERVAL HPI/OVERNIGHT EVENTS: No acute events noted overnight. Patient is for ERCP on 11/7/22. Patient reports feeling better, denies nausea or emesis. Denies fevers or chills.       REVIEW OF SYSTEMS:  CONSTITUTIONAL: No fever, weight loss, or fatigue  RESPIRATORY: No cough, wheezing, chills or hemoptysis; No shortness of breath  CARDIOVASCULAR: No chest pain, palpitations, dizziness, or leg swelling  GASTROINTESTINAL: Has  abdominal pain. No nausea, vomiting, or hematemesis; No diarrhea or constipation. No melena or hematochezia.  GENITOURINARY: No dysuria or hematuria, urinary frequency  NEUROLOGICAL: No headaches, memory loss, loss of strength, numbness, or tremors  SKIN: No itching, burning, rashes, or lesions     MEDICATIONS  (STANDING):  lactated ringers. 1000 milliLiter(s) (200 mL/Hr) IV Continuous <Continuous>  meropenem  IVPB      meropenem  IVPB 1000 milliGRAM(s) IV Intermittent every 8 hours    MEDICATIONS  (PRN):  acetaminophen     Tablet .. 650 milliGRAM(s) Oral every 6 hours PRN Temp greater or equal to 38C (100.4F), Mild Pain (1 - 3)  aluminum hydroxide/magnesium hydroxide/simethicone Suspension 30 milliLiter(s) Oral every 4 hours PRN Dyspepsia  morphine  - Injectable 1 milliGRAM(s) IV Push every 6 hours PRN Moderate Pain (4 - 6)  morphine  - Injectable 2 milliGRAM(s) IV Push every 6 hours PRN Severe Pain (7 - 10)  ondansetron Injectable 4 milliGRAM(s) IV Push every 8 hours PRN Nausea and/or Vomiting    Vital Signs Last 24 Hrs  T(C): 37 (04 Nov 2022 13:08), Max: 37 (04 Nov 2022 13:08)  T(F): 98.6 (04 Nov 2022 13:08), Max: 98.6 (04 Nov 2022 13:08)  HR: 70 (04 Nov 2022 13:08) (70 - 78)  BP: 166/79 (04 Nov 2022 13:08) (158/73 - 166/79)  BP(mean): --  RR: 16 (04 Nov 2022 13:08) (15 - 18)  SpO2: 95% (04 Nov 2022 13:08) (95% - 96%)    Parameters below as of 04 Nov 2022 13:08  Patient On (Oxygen Delivery Method): room air        PHYSICAL EXAMINATION:  GENERAL: NAD, well built  HEAD:  Atraumatic, Normocephalic  EYES:  conjunctiva and sclera clear  NECK: Supple, No JVD, Normal thyroid  CHEST/LUNG: Clear to auscultation. Clear to percussion bilaterally; No rales, rhonchi, wheezing, or rubs  HEART: Regular rate and rhythm; No murmurs, rubs, or gallops  ABDOMEN: Soft, less tender in epigastric region, Nondistended; Bowel sounds present  NERVOUS SYSTEM:  Alert & Oriented X3,    EXTREMITIES:  2+ Peripheral Pulses, No clubbing, cyanosis, or edema  SKIN: warm dry                          12.7   12.14 )-----------( 227      ( 04 Nov 2022 08:10 )             38.8     11-04    141  |  105  |  11  ----------------------------<  62<L>  3.8   |  23  |  0.46<L>    Ca    8.4      04 Nov 2022 08:10  Phos  2.7     11-04  Mg     2.3     11-04    TPro  5.3<L>  /  Alb  1.8<L>  /  TBili  0.6  /  DBili  x   /  AST  16  /  ALT  21  /  AlkPhos  115  11-04    LIVER FUNCTIONS - ( 04 Nov 2022 08:10 )  Alb: 1.8 g/dL / Pro: 5.3 g/dL / ALK PHOS: 115 U/L / ALT: 21 U/L DA / AST: 16 U/L / GGT: x               PT/INR - ( 04 Nov 2022 08:10 )   PT: 18.3 sec;   INR: 1.53 ratio         PTT - ( 04 Nov 2022 08:10 )  PTT:30.0 sec    CAPILLARY BLOOD GLUCOSE      RADIOLOGY & ADDITIONAL TESTS:

## 2022-11-04 NOTE — CONSULT NOTE ADULT - ASSESSMENT
This is a 77F, coming from home, ambulates independently, with no significant PMHx p/w worsening abdominal pain x3d. [facility] consulted for gallstone pancreatitis for possible ERCP.  She states her abdominal pain started on Thursday, after taking new abx, amoxicillin  started on Monday after a recent dental removal. She describes abdominal pain to radiate to her back with sudden nausea and vomiting waking her up on Friday morning. She states that she has not been able to hold any food/ drink down, reporting food aversion since then. Denies any prior episodes of this happening. She states that last year she was told that she was told that she had hardening of the gallbladder with no stones, she does not follow GI o/p. No family with GI issues or cancers. Brother with multiple myeloma. Lab significant for WBC 13 INR 1.5 and normal LFTs lipase 5600. MRCP showed The common bile duct measures up to 6 mm in caliber which is within normal limits. 5 mm distal common bile duct stone. Cholelithiasis. Peripancreatic edema, compatible with acute pancreatitis. 5.9 x 2.4 cm area of walled-off necrosis at the pancreatic body/tail. Patient was a a former and quit 30+ years ago. Remote history of ETOH use. She does take EZ MG supplements. She is gaining weight. Patient seen and examined at bedside. Alert and oriented. Abdomen non tender and non distended. Denies any N&V. NO blood or dark tarry stool. Tolerating clears. Patient denies fevers, chills, or recent illness. Patient denies HA, chest pain, SOB, abdominal pain, and changes in BM and urination.      #Gallstone pancreatitis  P/W with N&V and epigastric pain. LFTs normal but lipase 5600+. MRCP showed The common bile duct measures up to 6 mm in caliber which is within normal limits. 5 mm distal common bile duct stone. Cholelithiasis. Peripancreatic edema, compatible with acute pancreatitis. 5.9 x 2.4 cm area of walled-off necrosis at the pancreatic body/tail. Patient warrants an ERCP in patient. Plan for a tentative ERCP on Monday. NPO after MN Sunday. Hold all AC for 24 hours. Covid swab within 72 hours. Daily Hepatic panel. Maintain on full liquid.

## 2022-11-04 NOTE — PROGRESS NOTE ADULT - SUBJECTIVE AND OBJECTIVE BOX
DATE OF SERVICE: 11-04-22 @ 09:51    INTERVAL HPI/OVERNIGHT EVENTS:  No overnight events. Pain slightly improved, WBC down 12.1.      MEDICATIONS  (STANDING):  lactated ringers. 1000 milliLiter(s) (200 mL/Hr) IV Continuous <Continuous>  meropenem  IVPB      meropenem  IVPB 1000 milliGRAM(s) IV Intermittent every 8 hours    MEDICATIONS  (PRN):  acetaminophen     Tablet .. 650 milliGRAM(s) Oral every 6 hours PRN Temp greater or equal to 38C (100.4F), Mild Pain (1 - 3)  aluminum hydroxide/magnesium hydroxide/simethicone Suspension 30 milliLiter(s) Oral every 4 hours PRN Dyspepsia  morphine  - Injectable 1 milliGRAM(s) IV Push every 6 hours PRN Moderate Pain (4 - 6)  morphine  - Injectable 2 milliGRAM(s) IV Push every 6 hours PRN Severe Pain (7 - 10)  ondansetron Injectable 4 milliGRAM(s) IV Push every 8 hours PRN Nausea and/or Vomiting      Vital Signs Last 24 Hrs  T(C): 36.6 (04 Nov 2022 05:13), Max: 37.3 (03 Nov 2022 13:56)  T(F): 97.8 (04 Nov 2022 05:13), Max: 99.2 (03 Nov 2022 13:56)  HR: 70 (04 Nov 2022 05:13) (70 - 78)  BP: 158/76 (04 Nov 2022 05:13) (151/76 - 158/76)  BP(mean): --  RR: 15 (04 Nov 2022 05:13) (15 - 18)  SpO2: 96% (04 Nov 2022 05:13) (95% - 97%)    Parameters below as of 04 Nov 2022 05:13  Patient On (Oxygen Delivery Method): room air      I&O's Detail        Physical Exam:  General: WN/WD NAD  Respiratory: airway patent, respirations unlabored, no increased WoB  Neurology: A&Ox3, nonfocal, TURNER x 4  Abdominal: Soft, mild epigastric TTP, no rebound/guarding      LABS:                        12.7   12.14 )-----------( 227      ( 04 Nov 2022 08:10 )             38.8     11-04    141  |  105  |  11  ----------------------------<  62<L>  3.8   |  23  |  0.46<L>    Ca    8.4      04 Nov 2022 08:10  Phos  2.7     11-04  Mg     2.3     11-04    TPro  5.3<L>  /  Alb  1.8<L>  /  TBili  0.6  /  DBili  x   /  AST  16  /  ALT  21  /  AlkPhos  115  11-04    PT/INR - ( 04 Nov 2022 08:10 )   PT: 18.3 sec;   INR: 1.53 ratio         PTT - ( 04 Nov 2022 08:10 )  PTT:30.0 sec

## 2022-11-04 NOTE — PROGRESS NOTE ADULT - ASSESSMENT
Patient is a 77y old  Female from home, ambulates independently, with no significant PMHx, presents to the ER for evaluation of worsening abdominal pain x 3d. She states her abdominal pain started on Thursday, after taking new abx, amoxicillin  started on Monday after a recent dental removal. She describes abdominal pain to radiate to her back with sudden nausea and vomiting waking her up on Friday morning. She states that she has not been able to hold any food/ drink down. ON Admission, she found to have no fever but Leukocytosis. The CT abd/pelvis shows Acute Pancreatitis. The ID consult requested to assist with further evaluation and antibiotic management.     # Acute Pancreatitis- most likely Gallstone pancreatitis- in the setting of Cholelithiasis seen on abdominal US from 11/1/22- MRCP as of 11/2 shows Peripancreatic edema, compatible with acute pancreatitis. 5.9 x 2.4 cm  area of walled-off necrosis at the pancreatic body/tail. Cholelithiasis  and  5 mm distal common bile duct stone.   # Leukocytosis - resolving   # Positive Urine culture- most likely a contaminant in the setting of Negative Urine analysis    would recommend:    1. Please monitor vitals closely since c/o headache and dizziness and supportive care  2. Continue Meropenem since MRCP shows area of walled-off necrosis at the pancreatic body/tail.  3. Monitor WBC count, started trending down slowly  4. IVF and pain management  5. May benefit from cholecystectomy after ERCP    d/w patient and Nursing staff     Attending Attestation:    Spent more than 35 minutes on total encounter, more than 50 % of the visit was spent counseling and/or coordinating care by the Attending physician.

## 2022-11-04 NOTE — CONSULT NOTE ADULT - SUBJECTIVE AND OBJECTIVE BOX
[facility] CONSULTATION    Patient is a 77y old  Female who presents with a chief complaint of Gallstone pancreatitis (04 Nov 2022 14:00)    HPI:  77F, coming from home, ambulates independently, with no significant PMHx p/w worsening abdominal pain x3d. She states her abdominal pain started on Thursday, after taking new abx, amoxicillin  started on Monday after a recent dental removal. She describes abdominal pain to radiate to her back with sudden nausea and vomiting waking her up on Friday morning. She states that she has not been able to hold any food/ drink down, reporting food aversion since then. Denies any prior episodes of this happening. She states that last year she was told that she was told that she had hardening of the gallbladder with no stones, she does not follow GI o/p. Patient denies fevers, chills, or recent illness. Patient denies HA, chest pain, SOB, abdominal pain, and changes in BM and urination.  (29 Oct 2022 19:23)    PMH/PSH:  PAST MEDICAL & SURGICAL HISTORY:  No pertinent past medical history        FH:  FAMILY HISTORY:      MEDS:  MEDICATIONS  (STANDING):  lactated ringers. 1000 milliLiter(s) (200 mL/Hr) IV Continuous <Continuous>  meropenem  IVPB      meropenem  IVPB 1000 milliGRAM(s) IV Intermittent every 8 hours    MEDICATIONS  (PRN):  acetaminophen     Tablet .. 650 milliGRAM(s) Oral every 6 hours PRN Temp greater or equal to 38C (100.4F), Mild Pain (1 - 3)  aluminum hydroxide/magnesium hydroxide/simethicone Suspension 30 milliLiter(s) Oral every 4 hours PRN Dyspepsia  morphine  - Injectable 1 milliGRAM(s) IV Push every 6 hours PRN Moderate Pain (4 - 6)  morphine  - Injectable 2 milliGRAM(s) IV Push every 6 hours PRN Severe Pain (7 - 10)  ondansetron Injectable 4 milliGRAM(s) IV Push every 8 hours PRN Nausea and/or Vomiting    Allergies    No Known Allergies    Intolerances            CONSTITUTIONAL:  No weight loss, fever, chills, weakness or fatigue.  HEENT:  Eyes:  No visual loss, blurred vision, double vision or yellow sclerae. Ears, Nose, Throat:  No hearing loss, sneezing, congestion, runny nose or sore throat.  SKIN:  No rash or itching.  CARDIOVASCULAR:  No chest pain, chest pressure or chest discomfort. No palpitations or edema.  RESPIRATORY:  No shortness of breath, cough or sputum.  GASTROINTESTINAL:  SEE HPI  GENITOURINARY:  No dysuria, hematuria, urinary frequency  NEUROLOGICAL:  No headache, dizziness, syncope, paralysis, ataxia, numbness or tingling in the extremities. No change in bowel or bladder control.  MUSCULOSKELETAL:  No muscle, back pain, joint pain or stiffness.        ______________________________________________________________________  PHYSICAL EXAM:  T(C): 37 (11-04-22 @ 13:08), Max: 37 (11-04-22 @ 13:08)  HR: 70 (11-04-22 @ 13:08)  BP: 166/79 (11-04-22 @ 13:08)  RR: 16 (11-04-22 @ 13:08)  SpO2: 95% (11-04-22 @ 13:08)  Wt(kg): --      GEN: NAD, normocephalic  CVS: S1S2+  CHEST: clear to auscultation  ABD: soft , nontender, nondistended, bowel sounds present  EXTR: no cyanosis, no clubbing, no edema  NEURO: Awake and alert; oriented x4  SKIN:  warm;  non icteric    ______________________________________________________________________  LABS:                        12.7   12.14 )-----------( 227      ( 04 Nov 2022 08:10 )             38.8     11-04    141  |  105  |  11  ----------------------------<  62<L>  3.8   |  23  |  0.46<L>    Ca    8.4      04 Nov 2022 08:10  Phos  2.7     11-04  Mg     2.3     11-04    TPro  5.3<L>  /  Alb  1.8<L>  /  TBili  0.6  /  DBili  x   /  AST  16  /  ALT  21  /  AlkPhos  115  11-04    LIVER FUNCTIONS - ( 04 Nov 2022 08:10 )  Alb: 1.8 g/dL / Pro: 5.3 g/dL / ALK PHOS: 115 U/L / ALT: 21 U/L DA / AST: 16 U/L / GGT: x           PT/INR - ( 04 Nov 2022 08:10 )   PT: 18.3 sec;   INR: 1.53 ratio         PTT - ( 04 Nov 2022 08:10 )  PTT:30.0 sec  ____________________________________________    IMAGING:    ______________________________________________________________________  < from: MR MRCP w/wo IV Cont (11.02.22 @ 13:10) >    ACC: [id number] EXAM:  MR MRCP WAW IC                          PROCEDURE DATE:  11/02/2022          INTERPRETATION:  CLINICAL INFORMATION: Acute pancreatitis    COMPARISON: No prior abdominal MR is available for comparison. Reference   is made with aprevious abdominal CT dated 10/29/2022.    CONTRAST/COMPLICATIONS:  IV Contrast: Gadavist  6 cc administered   4 cc discarded  Oral Contrast: NONE  Complications: None reported at time of study completion    PROCEDURE:  MRI of the abdomen was performed.  MRCP was performed.    FINDINGS:  LOWER CHEST: Mild bilateral pleural effusions.    LIVER: 2 small left hepatic cysts measuring up to 2.0 cm.  BILE DUCTS: Normal caliber. The common bile duct measures up to 6 mm in   caliber which is within normal limits. 5 mm distal common bile duct stone   (image 63 series 13).  GALLBLADDER: Small gallstones in the gallbladder. No evidence for   thickened gallbladder wall or pericholecystic fluid.  SPLEEN: Within normal limits.  PANCREAS: Peripancreatic edema, compatible with acute pancreatitis. 5.9 x   2.4 cm area of walled-off necrosis at the pancreatic body/tail.  ADRENALS: Within normal limits.  KIDNEYS/URETERS: Bilateral renal cysts measuring up to 4.2 cm on the   right.    VISUALIZED PORTIONS:  BOWEL: Colonic diverticulosis  PERITONEUM: Small ascites.  VESSELS: Within normal limits.  RETROPERITONEUM/LYMPH NODES: No lymphadenopathy.  ABDOMINAL WALL: Within normal limits.  BONES: Within normal limits.    IMPRESSION:  The common bile duct measures up to 6 mm in caliber which is within   normal limits. 5 mm distal common bile duct stone.    Cholelithiasis.    Peripancreatic edema, compatible with acute pancreatitis. 5.9 x 2.4 cm   area of walled-off necrosis at the pancreatic body/tail.    Small ascites.    Mild bilateral pleural effusions.    --- End of Report ---    < end of copied text >  < from: US Abdomen Upper Quadrant Right (11.01.22 @ 11:28) >    ACC: [id number] EXAM:  US ABDOMEN RT UPR QUADRANT                          PROCEDURE DATE:  11/01/2022          INTERPRETATION:  CLINICAL INFORMATION: Pancreatitis. Abdominal pain.    COMPARISON: No prior abdominal ultrasound is available for comparison.   Reference is made with a previous abdominal CT dated 10/29/2022    TECHNIQUE: Sonography of the right upper quadrant.    FINDINGS:  Liver: Mild hepatomegaly.  Bile ducts: Common bile duct measures 7 mm in caliber which is within   normal limitsfor age.  Gallbladder: Cholelithiasis without evidence of thickened gallbladder   wall, pericholecystic fluid or ultrasonic Gonzalez's sign.  Pancreas: Mild peripancreatic fluid, consistent with patient's known   acute pancreatitis.  Right kidney: 10.7cm. No hydronephrosis. 5.4 cm right renal cyst.  Ascites: None.  IVC: Visualized portions are within normal limits.    IMPRESSION:  Cholelithiasis without evidence of thickened gallbladder wall,   pericholecystic fluid or ultrasonic Gonzalez's sign.    Mild peripancreatic fluid, consistent with patient's known acute   pancreatitis.    --- End of Report ---      < end of copied text >  < from: CT Abdomen and Pelvis w/ IV Cont (10.29.22 @ 13:37) >    ACC: [id number] EXAM:  CT ABDOMEN AND PELVIS IC                          PROCEDURE DATE:  10/29/2022          INTERPRETATION:  CLINICAL INFORMATION: Lower abdominal pain    COMPARISON: None.    CONTRAST/COMPLICATIONS:  IV Contrast: Omnipaque 350  90 cc administered   10 cc discarded  Oral Contrast: NONE  Complications: None reported at time of study completion    PROCEDURE:  CT of the Abdomen and Pelvis was performed.  Sagittal and coronal reformats were performed.    FINDINGS:  LOWER CHEST: Chronic changes in the lung bases with additional linear   atelectasis and/or scarring.    LIVER: Hepatic cyst adjacent to the gallbladder fossa. Trace perihepatic   ascites.  BILE DUCTS: Common bile duct measures up to 8 mm. No definite common bile   duct calculus delineated on current study, correlate with bilirubin   levels.  GALLBLADDER: Gallbladder is mildly distended.  SPLEEN: Within normal limits.  PANCREAS: Edema the pancreatic body and tail with focal area of marked   decreased enhancement ofthe proximal pancreatic body with peripancreatic   infiltrative changes and peripancreatic fluid extending inferiorly along   the bilateral paracolic gutters compatible with acute pancreatitis.  ADRENALS: Within normal limits.  KIDNEYS/URETERS: No hydronephrosis bilaterally. Anterior exophytic right   mid to lower pole cyst. Additional tiny hypodensities bilaterally to   small to characterize. No intrarenal calculi. The ureters are   unremarkable.    BLADDER: Within normal limits.  REPRODUCTIVE ORGANS: Mild to moderate pelvic ascites. Uterus and adnexa   are unremarkable.    BOWEL: No bowel obstruction. Appendix is normal. Colonic diverticulosis   without definite evidence of acute diverticulitis.  PERITONEUM: No ascites.  VESSELS: Atherosclerotic changes.  RETROPERITONEUM/LYMPH NODES: No lymphadenopathy.  ABDOMINAL WALL: Tiny fat-containing umbilical hernia.  BONES: Degenerative changes.    IMPRESSION:  Acute pancreatitis with focal area of decreased enhancement in the   proximal anterior pancreatic body.        --- End of Report ---    < end of copied text >

## 2022-11-04 NOTE — PROGRESS NOTE ADULT - ASSESSMENT
1. Abdominal pain  2. Acute pancreatitis  3. Gall stone  4. R/o CBD stone    Suggestions:    1. Diet as tolerated  2. Surgical evaluation  3. Protonix daily  4. Avoid NSAID  5. ERCP by Dr Field  6. Pain control  7. DVT prophylaxis

## 2022-11-04 NOTE — PROGRESS NOTE ADULT - SUBJECTIVE AND OBJECTIVE BOX
[   ] ICU                                          [   ] CCU                                      [ X  ] Medical Floor      Patient is a 77 year old female with abdominal pain. GI consulted to evaluate.         77 year old female from home, ambulates independently, with out significant past medical history presented with 3 days history of progressively worsening sharp constant, 8/10 intensity epigastric abdominal pain radiating to her back associated with nausea, non bloody vomiting. Patient reports abdominal pain started after starting taking Amoxacillin for her recent dental removal. Patient denies hematemesis, hematochezia, melena, fever, chills, chest pain, SOB, cough, hematuria, dysuria, diarrhea or taking ETOH.    Patient is comfortable. No new complaints reported, No abdominal pain, N/V, hematemesis, hematochezia, melena, fever, chills, chest pain, SOB, cough or diarrhea reported.           PAIN MANAGEMENT:  Pain Scale:                5/10  Pain Location:  Epigastric abdominal pain      Prior Colonoscopy:  No prior colonoscopy      PAST MEDICAL HISTORY  No significant past medical history reported      PAST SURGICAL HISTORY  No significant surgical history reported      Allergies    No Known Allergies    Intolerances  None       SOCIAL HISTORY  Advanced Directives:       [ X ] Full Code       [  ] DNR  Marital Status:         [  ] M      [ X ] S      [  ] D       [  ] W  Children:       [ X ] Yes      [  ] No  Occupation:        [  ] Employed       [ X] Unemployed       [  ] Retired  Diet:       [ X ] Regular       [  ] PEG feeding          [  ] NG tube feeding  Drug Use:           [ X ] Patient denied          [  ] Yes  Alcohol:           [  X] No             [  ] Yes (socially)         [  ] Yes (chronic)  Tobacco:           [  ] Yes           [  X] No      FAMILY HISTORY  [ X ] Heart Disease            [ X ] Diabetes             [ X ] HTN             [  ] Colon Cancer             [  ] Stomach Cancer              [  ] Pancreatic Cancer      VITALS  Vital Signs Last 24 Hrs  T(C): 37 (04 Nov 2022 13:08), Max: 37 (04 Nov 2022 13:08)  T(F): 98.6 (04 Nov 2022 13:08), Max: 98.6 (04 Nov 2022 13:08)  HR: 70 (04 Nov 2022 13:08) (70 - 78)  BP: 166/79 (04 Nov 2022 13:08) (158/73 - 166/79)   RR: 16 (04 Nov 2022 13:08) (15 - 18)  SpO2: 95% (04 Nov 2022 13:08) (95% - 96%)  Parameters below as of 04 Nov 2022 13:08  Patient On (Oxygen Delivery Method): room air       MEDICATIONS  (STANDING):  lactated ringers. 1000 milliLiter(s) (200 mL/Hr) IV Continuous <Continuous>  meropenem  IVPB      meropenem  IVPB 1000 milliGRAM(s) IV Intermittent every 8 hours    MEDICATIONS  (PRN):  acetaminophen     Tablet .. 650 milliGRAM(s) Oral every 6 hours PRN Temp greater or equal to 38C (100.4F), Mild Pain (1 - 3)  aluminum hydroxide/magnesium hydroxide/simethicone Suspension 30 milliLiter(s) Oral every 4 hours PRN Dyspepsia  morphine  - Injectable 1 milliGRAM(s) IV Push every 6 hours PRN Moderate Pain (4 - 6)  morphine  - Injectable 2 milliGRAM(s) IV Push every 6 hours PRN Severe Pain (7 - 10)  ondansetron Injectable 4 milliGRAM(s) IV Push every 8 hours PRN Nausea and/or Vomiting                            12.7   12.14 )-----------( 227      ( 04 Nov 2022 08:10 )             38.8       11-04    141  |  105  |  11  ----------------------------<  62<L>  3.8   |  23  |  0.46<L>    Ca    8.4      04 Nov 2022 08:10  Phos  2.7     11-04  Mg     2.3     11-04    TPro  5.3<L>  /  Alb  1.8<L>  /  TBili  0.6  /  DBili  x   /  AST  16  /  ALT  21  /  AlkPhos  115  11-04      PT/INR - ( 04 Nov 2022 08:10 )   PT: 18.3 sec;   INR: 1.53 ratio         PTT - ( 04 Nov 2022 08:10 )  PTT:30.0 sec

## 2022-11-05 LAB
ALBUMIN SERPL ELPH-MCNC: 1.9 G/DL — LOW (ref 3.5–5)
ALP SERPL-CCNC: 112 U/L — SIGNIFICANT CHANGE UP (ref 40–120)
ALT FLD-CCNC: 19 U/L DA — SIGNIFICANT CHANGE UP (ref 10–60)
ANION GAP SERPL CALC-SCNC: 15 MMOL/L — SIGNIFICANT CHANGE UP (ref 5–17)
AST SERPL-CCNC: 15 U/L — SIGNIFICANT CHANGE UP (ref 10–40)
BILIRUB SERPL-MCNC: 0.5 MG/DL — SIGNIFICANT CHANGE UP (ref 0.2–1.2)
BUN SERPL-MCNC: 10 MG/DL — SIGNIFICANT CHANGE UP (ref 7–18)
CALCIUM SERPL-MCNC: 8.4 MG/DL — SIGNIFICANT CHANGE UP (ref 8.4–10.5)
CHLORIDE SERPL-SCNC: 106 MMOL/L — SIGNIFICANT CHANGE UP (ref 96–108)
CO2 SERPL-SCNC: 20 MMOL/L — LOW (ref 22–31)
CREAT SERPL-MCNC: 0.48 MG/DL — LOW (ref 0.5–1.3)
EGFR: 96 ML/MIN/1.73M2 — SIGNIFICANT CHANGE UP
GLUCOSE SERPL-MCNC: 67 MG/DL — LOW (ref 70–99)
HCT VFR BLD CALC: 41.1 % — SIGNIFICANT CHANGE UP (ref 34.5–45)
HGB BLD-MCNC: 13.4 G/DL — SIGNIFICANT CHANGE UP (ref 11.5–15.5)
MAGNESIUM SERPL-MCNC: 2.1 MG/DL — SIGNIFICANT CHANGE UP (ref 1.6–2.6)
MCHC RBC-ENTMCNC: 28.3 PG — SIGNIFICANT CHANGE UP (ref 27–34)
MCHC RBC-ENTMCNC: 32.6 GM/DL — SIGNIFICANT CHANGE UP (ref 32–36)
MCV RBC AUTO: 86.9 FL — SIGNIFICANT CHANGE UP (ref 80–100)
NRBC # BLD: 0 /100 WBCS — SIGNIFICANT CHANGE UP (ref 0–0)
PHOSPHATE SERPL-MCNC: 2.6 MG/DL — SIGNIFICANT CHANGE UP (ref 2.5–4.5)
PLATELET # BLD AUTO: 283 K/UL — SIGNIFICANT CHANGE UP (ref 150–400)
POTASSIUM SERPL-MCNC: 3.5 MMOL/L — SIGNIFICANT CHANGE UP (ref 3.5–5.3)
POTASSIUM SERPL-SCNC: 3.5 MMOL/L — SIGNIFICANT CHANGE UP (ref 3.5–5.3)
PROT SERPL-MCNC: 5.6 G/DL — LOW (ref 6–8.3)
RBC # BLD: 4.73 M/UL — SIGNIFICANT CHANGE UP (ref 3.8–5.2)
RBC # FLD: 12.9 % — SIGNIFICANT CHANGE UP (ref 10.3–14.5)
SARS-COV-2 RNA SPEC QL NAA+PROBE: SIGNIFICANT CHANGE UP
SODIUM SERPL-SCNC: 141 MMOL/L — SIGNIFICANT CHANGE UP (ref 135–145)
WBC # BLD: 12.51 K/UL — HIGH (ref 3.8–10.5)
WBC # FLD AUTO: 12.51 K/UL — HIGH (ref 3.8–10.5)

## 2022-11-05 RX ADMIN — Medication 650 MILLIGRAM(S): at 16:34

## 2022-11-05 RX ADMIN — MEROPENEM 100 MILLIGRAM(S): 1 INJECTION INTRAVENOUS at 13:57

## 2022-11-05 RX ADMIN — MEROPENEM 100 MILLIGRAM(S): 1 INJECTION INTRAVENOUS at 21:26

## 2022-11-05 RX ADMIN — Medication 650 MILLIGRAM(S): at 17:10

## 2022-11-05 RX ADMIN — Medication 30 MILLILITER(S): at 16:34

## 2022-11-05 RX ADMIN — MEROPENEM 100 MILLIGRAM(S): 1 INJECTION INTRAVENOUS at 06:37

## 2022-11-05 NOTE — PROGRESS NOTE ADULT - ASSESSMENT
Patient is a 77y old  Female from home, ambulates independently, with no significant PMHx, presents to the ER for evaluation of worsening abdominal pain x 3d. She states her abdominal pain started on Thursday, after taking new abx, amoxicillin  started on Monday after a recent dental removal. She describes abdominal pain to radiate to her back with sudden nausea and vomiting waking her up on Friday morning. She states that she has not been able to hold any food/ drink down. ON Admission, she found to have no fever but Leukocytosis. The CT abd/pelvis shows Acute Pancreatitis. The ID consult requested to assist with further evaluation and antibiotic management.     # Acute Pancreatitis- most likely Gallstone pancreatitis- in the setting of Cholelithiasis seen on abdominal US from 11/1/22- MRCP as of 11/2 shows Peripancreatic edema, compatible with acute pancreatitis. 5.9 x 2.4 cm  area of walled-off necrosis at the pancreatic body/tail. Cholelithiasis  and  5 mm distal common bile duct stone.   # Leukocytosis - resolving   # Positive Urine culture- most likely a contaminant in the setting of Negative Urine analysis    would recommend:    1. IVF and Pain management as needed  2. Continue Meropenem since MRCP shows area of walled-off necrosis at the pancreatic body/tail.  3. Monitor WBC count, stay mildly elevated  4. May benefit from cholecystectomy after ERCP  5. Agree with ERCP on Monday    d/w patient and Nursing staff     Attending Attestation:    Spent more than 35 minutes on total encounter, more than 50 % of the visit was spent counseling and/or coordinating care by the Attending physician.

## 2022-11-05 NOTE — PROGRESS NOTE ADULT - SUBJECTIVE AND OBJECTIVE BOX
Patient is seen and examined at the bed side, is afebrile. She has abdominal pain and afraid to eat. The WBC count stay mildly elevated.       REVIEW OF SYSTEMS: All other review systems are negative      ALLERGIES: No Known Allergies      Vital Signs Last 24 Hrs  T(C): 36.6 (2022 13:39), Max: 36.6 (2022 13:39)  T(F): 97.8 (2022 13:39), Max: 97.8 (2022 13:39)  HR: 73 (2022 13:39) (73 - 73)  BP: 154/71 (2022 13:39) (131/70 - 154/71)  BP(mean): --  RR: 16 (2022 13:39) (16 - 17)  SpO2: 96% (2022 13:39) (96% - 98%)    Parameters below as of 2022 13:39  Patient On (Oxygen Delivery Method): room air      PHYSICAL EXAM:  GENERAL: Not in distress   CHEST/LUNG: Not using accessory muscles   HEART: s1 and s2 present  ABDOMEN:  epigastric and left sided tenderness positive  EXTREMITIES: No pedal  edema  CNS: Awake and Alert      LABS:                         13.4   12.51 )-----------( 283      ( 2022 07:29 )             41.1                        13.1   13.22 )-----------( 206      ( 2022 08:23 )             39.9                          141  |  106  |  10  ----------------------------<  67<L>  3.5   |  20<L>  |  0.48<L>    Ca    8.4      2022 07:29  Phos  2.6       Mg     2.1         TPro  5.6<L>  /  Alb  1.9<L>  /  TBili  0.5  /  DBili  x   /  AST  15  /  ALT  19  /  AlkPhos  112  04    141  |  105  |  11  ----------------------------<  62<L>  3.8   |  23  |  0.46<L>    Ca    8.4      2022 08:10  Phos  2.7     11-  Mg     2.3     11-    TPro  5.3<L>  /  Alb  1.8<L>  /  TBili  0.6  /  DBili  x   /  AST  16  /  ALT  21  /  AlkPhos  115  11-04      Urinalysis Basic - ( 29 Oct 2022 14:40 )  Color: Raina / Appearance: Clear / S.025 / pH: x  Gluc: x / Ketone: Small  / Bili: Negative / Urobili: Negative   Blood: x / Protein: 100 / Nitrite: Negative   Leuk Esterase: Trace / RBC: 5-10 /HPF / WBC 0-2 /HPF   Sq Epi: x / Non Sq Epi: Negative /HPF / Bacteria: Negative /HPF        MEDICATIONS  (STANDING):    lactated ringers. 1000 milliLiter(s) (200 mL/Hr) IV Continuous <Continuous>  meropenem  IVPB      meropenem  IVPB 1000 milliGRAM(s) IV Intermittent every 8 hours    RADIOLOGY & ADDITIONAL TESTS:    22: MR MRCP w/wo IV Cont (22 @ 13:10) The common bile duct measures up to 6 mm in caliber which is within   normal limits. 5 mm distal common bile duct stone. Cholelithiasis.    Peripancreatic edema, compatible with acute pancreatitis. 5.9 x 2.4 cm  area of walled-off necrosis at the pancreatic body/tail.    Small ascites.  Mild bilateral pleural effusions.      22: US Abdomen Upper Quadrant Right (22 @ 11:28) Cholelithiasis without evidence of thickened gallbladder wall,   pericholecystic fluid or ultrasonic Gonzalez's sign.    Mild peripancreatic fluid, consistent with patient's known acute pancreatitis.      10/29/22: CT Abdomen and Pelvis w/ IV Cont (10.29.22 @ 13:37) >Acute pancreatitis with focal area of decreased enhancement in the   proximal anterior pancreatic body.        MICROBIOLOGY DATA:    Culture - Urine (10.29.22 @ 14:40)   - Amikacin: S <=16   - Amoxicillin/Clavulanic Acid: S <=8/4   - Ampicillin: R >16 These ampicillin results predict results for amoxicillin   - Ampicillin/Sulbactam: S  Enterobacter, Klebsiella aerogenes, Citrobacter, and Serratia may develop resistance during prolonged therapy (3-4 days)   - Aztreonam: S <=4   - Cefazolin: S <=2 For uncomplicated UTI with K. pneumoniae, E. coli, or P. mirablis: JACINTO <=16 is sensitive and JACINTO >=32 is resistant. This also predicts results for oral agents cefaclor, cefdinir, cefpodoxime, cefprozil, cefuroxime axetil, cephalexin and locarbef for uncomplicated UTI. Note that some isolates may be susceptible to these agents while testing resistant to cefazolin.   - Cefepime: S <=2   - Cefoxitin: S <=8   - Ceftriaxone: S <=1 Enterobacter, Klebsiella aerogenes, Citrobacter, and Serratia may develop resistance during prolonged therapy   - Ciprofloxacin: S <=0.25   - Ertapenem: S <=0.5   - Gentamicin: S <=2   - Imipenem: S <=1   - Levofloxacin: S <=0.5   - Meropenem: S <=1   - Nitrofurantoin: I 64 Should not be used to treat pyelonephritis   - Piperacillin/Tazobactam: S <=8   - Tigecycline: S <=2   - Tobramycin: S <=2   - Trimethoprim/Sulfamethoxazole: S <=0.5/9.5   Specimen Source: Clean Catch Clean Catch (Midstream)   Culture Results:   10,000 - 49,000 CFU/mL Klebsiella pneumoniae   <10,000 CFU/ml Normal Urogenital vito present   Organism Identification: Klebsiella pneumoniae   Organism: Klebsiella pneumoniae   COVID-19 PCR . (10.29.22 @ 14:40)   COVID-19 PCR: NotDetec:

## 2022-11-05 NOTE — PROGRESS NOTE ADULT - PSYCHIATRIC
details… normal affect/alert and oriented x3/normal behavior Cheek Interpolation Flap Division And Inset Text: Division and inset of the cheek interpolation flap was performed to achieve optimal aesthetic result, restore normal anatomic appearance and avoid distortion of normal anatomy, expedite and facilitate wound healing, achieve optimal functional result and because linear closure either not possible or would produce suboptimal result. The patient was prepped and draped in the usual manner. The pedicle was infiltrated with local anesthesia. The pedicle was sectioned with a #15 blade. The pedicle was de-bulked and trimmed to match the shape of the defect. Hemostasis was achieved. The flap donor site and free margin of the flap were secured with deep buried sutures and the wound edges were re-approximated.

## 2022-11-05 NOTE — PROGRESS NOTE ADULT - ASSESSMENT
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 can plan for outpatient intervention in 6-8 weeks after inflammation improves  Continue IVF hydration, monitor UOP, I/Os  Slow advancement of diet, having some discomfort with CLD would recommend continuing clears for now  Will follow along

## 2022-11-05 NOTE — PROGRESS NOTE ADULT - SUBJECTIVE AND OBJECTIVE BOX
[   ] ICU                                          [   ] CCU                                      [  X ] Medical Floor      Patient is a 77 year old female with abdominal pain. GI consulted to evaluate.         77 year old female from home, ambulates independently, with out significant past medical history presented with 3 days history of progressively worsening sharp constant, 8/10 intensity epigastric abdominal pain radiating to her back associated with nausea, non bloody vomiting. Patient reports abdominal pain started after starting taking Amoxacillin for her recent dental removal. Patient denies hematemesis, hematochezia, melena, fever, chills, chest pain, SOB, cough, hematuria, dysuria, diarrhea or taking ETOH.    Patient is comfortable. No new complaints reported, No abdominal pain, N/V, hematemesis, hematochezia, melena, fever, chills, chest pain, SOB, cough or diarrhea reported.           PAIN MANAGEMENT:  Pain Scale:                5/10  Pain Location:  Epigastric abdominal pain      Prior Colonoscopy:  No prior colonoscopy      PAST MEDICAL HISTORY  No significant past medical history reported      PAST SURGICAL HISTORY  No significant surgical history reported      Allergies    No Known Allergies    Intolerances  None       SOCIAL HISTORY  Advanced Directives:       [ X ] Full Code       [  ] DNR  Marital Status:         [  ] M      [ X ] S      [  ] D       [  ] W  Children:       [ X ] Yes      [  ] No  Occupation:        [  ] Employed       [ X] Unemployed       [  ] Retired  Diet:       [ X ] Regular       [  ] PEG feeding          [  ] NG tube feeding  Drug Use:           [ X ] Patient denied          [  ] Yes  Alcohol:           [  X] No             [  ] Yes (socially)         [  ] Yes (chronic)  Tobacco:           [  ] Yes           [  X] No      FAMILY HISTORY  [ X ] Heart Disease            [ X ] Diabetes             [ X ] HTN             [  ] Colon Cancer             [  ] Stomach Cancer              [  ] Pancreatic Cancer      VITALS  Vital Signs Last 24 Hrs  T(C): 36.6 (05 Nov 2022 13:39), Max: 36.6 (05 Nov 2022 13:39)  T(F): 97.8 (05 Nov 2022 13:39), Max: 97.8 (05 Nov 2022 13:39)  HR: 73 (05 Nov 2022 13:39) (73 - 78)  BP: 154/71 (05 Nov 2022 13:39) (131/70 - 159/74)   RR: 16 (05 Nov 2022 13:39) (16 - 18)  SpO2: 96% (05 Nov 2022 13:39) (94% - 98%)  Parameters below as of 05 Nov 2022 13:39  Patient On (Oxygen Delivery Method): room air       MEDICATIONS  (STANDING):  lactated ringers. 1000 milliLiter(s) (200 mL/Hr) IV Continuous <Continuous>  meropenem  IVPB      meropenem  IVPB 1000 milliGRAM(s) IV Intermittent every 8 hours    MEDICATIONS  (PRN):  acetaminophen     Tablet .. 650 milliGRAM(s) Oral every 6 hours PRN Temp greater or equal to 38C (100.4F), Mild Pain (1 - 3)  aluminum hydroxide/magnesium hydroxide/simethicone Suspension 30 milliLiter(s) Oral every 4 hours PRN Dyspepsia  morphine  - Injectable 1 milliGRAM(s) IV Push every 6 hours PRN Moderate Pain (4 - 6)  morphine  - Injectable 2 milliGRAM(s) IV Push every 6 hours PRN Severe Pain (7 - 10)  ondansetron Injectable 4 milliGRAM(s) IV Push every 8 hours PRN Nausea and/or Vomiting                            13.4   12.51 )-----------( 283      ( 05 Nov 2022 07:29 )             41.1       11-05    141  |  106  |  10  ----------------------------<  67<L>  3.5   |  20<L>  |  0.48<L>    Ca    8.4      05 Nov 2022 07:29  Phos  2.6     11-05  Mg     2.1     11-05    TPro  5.6<L>  /  Alb  1.9<L>  /  TBili  0.5  /  DBili  x   /  AST  15  /  ALT  19  /  AlkPhos  112  11-05      PT/INR - ( 04 Nov 2022 08:10 )   PT: 18.3 sec;   INR: 1.53 ratio         PTT - ( 04 Nov 2022 08:10 )  PTT:30.0 sec

## 2022-11-05 NOTE — PROGRESS NOTE ADULT - SUBJECTIVE AND OBJECTIVE BOX
INTERVAL HPI/OVERNIGHT EVENTS: pt seen and examined    REVIEW OF SYSTEMS:  CONSTITUTIONAL: No fever, weight loss, or fatigue  RESPIRATORY: No cough, wheezing, chills or hemoptysis; No shortness of breath  CARDIOVASCULAR: No chest pain, palpitations, dizziness, or leg swelling  GASTROINTESTINAL: Has  abdominal pain. No nausea, vomiting, or hematemesis; No diarrhea or constipation. No melena or hematochezia.  GENITOURINARY: No dysuria or hematuria, urinary frequency  NEUROLOGICAL: No headaches, memory loss, loss of strength, numbness, or tremors  SKIN: No itching, burning, rashes, or lesions     MEDICATIONS  (STANDING):  lactated ringers. 1000 milliLiter(s) (200 mL/Hr) IV Continuous <Continuous>  meropenem  IVPB      meropenem  IVPB 1000 milliGRAM(s) IV Intermittent every 8 hours    MEDICATIONS  (PRN):  acetaminophen     Tablet .. 650 milliGRAM(s) Oral every 6 hours PRN Temp greater or equal to 38C (100.4F), Mild Pain (1 - 3)  aluminum hydroxide/magnesium hydroxide/simethicone Suspension 30 milliLiter(s) Oral every 4 hours PRN Dyspepsia  morphine  - Injectable 1 milliGRAM(s) IV Push every 6 hours PRN Moderate Pain (4 - 6)  morphine  - Injectable 2 milliGRAM(s) IV Push every 6 hours PRN Severe Pain (7 - 10)  ondansetron Injectable 4 milliGRAM(s) IV Push every 8 hours PRN Nausea and/or Vomiting    Vital Signs Last 24 Hrs  T(C): 36.6 (11-05-22 @ 13:39), Max: 36.6 (11-05-22 @ 13:39)  T(F): 97.8 (11-05-22 @ 13:39), Max: 97.8 (11-05-22 @ 13:39)  HR: 73 (11-05-22 @ 13:39) (73 - 73)  BP: 154/71 (11-05-22 @ 13:39) (131/70 - 154/71)  BP(mean): --  RR: 16 (11-05-22 @ 13:39) (16 - 17)  SpO2: 96% (11-05-22 @ 13:39) (96% - 98%)      PHYSICAL EXAMINATION:  GENERAL: NAD, well built  HEAD:  Atraumatic, Normocephalic  EYES:  conjunctiva and sclera clear  NECK: Supple, No JVD, Normal thyroid  CHEST/LUNG: Clear to auscultation. Clear to percussion bilaterally; No rales, rhonchi, wheezing, or rubs  HEART: Regular rate and rhythm; No murmurs, rubs, or gallops  ABDOMEN: Soft, less tender in epigastric region, Nondistended; Bowel sounds present  NERVOUS SYSTEM:  Alert & Oriented X3,    EXTREMITIES:  2+ Peripheral Pulses, No clubbing, cyanosis, or edema  SKIN: warm dry                  LABS:  11-05    141  |  106  |  10  ----------------------------<  67<L>  3.5   |  20<L>  |  0.48<L>    Ca    8.4      05 Nov 2022 07:29  Phos  2.6     11-05  Mg     2.1     11-05    TPro  5.6<L>  /  Alb  1.9<L>  /  TBili  0.5  /  DBili      /  AST  15  /  ALT  19  /  AlkPhos  112  11-05    Creatinine Trend: 0.48 <--, 0.46 <--, 0.46 <--, 0.42 <--, 0.43 <--, 0.58 <--, 0.65 <--                        13.4   12.51 )-----------( 283      ( 05 Nov 2022 07:29 )             41.1     Urine Studies:            LIVER FUNCTIONS - ( 05 Nov 2022 07:29 )  Alb: 1.9 g/dL / Pro: 5.6 g/dL / ALK PHOS: 112 U/L / ALT: 19 U/L DA / AST: 15 U/L / GGT: x           PT/INR - ( 04 Nov 2022 08:10 )   PT: 18.3 sec;   INR: 1.53 ratio         PTT - ( 04 Nov 2022 08:10 )  PTT:30.0 sec

## 2022-11-05 NOTE — PROGRESS NOTE ADULT - SUBJECTIVE AND OBJECTIVE BOX
DATE OF SERVICE: 11-05-22 @ 09:37    INTERVAL HPI/OVERNIGHT EVENTS:  No events, has some discomfort after eating.    MEDICATIONS  (STANDING):  lactated ringers. 1000 milliLiter(s) (200 mL/Hr) IV Continuous <Continuous>  meropenem  IVPB      meropenem  IVPB 1000 milliGRAM(s) IV Intermittent every 8 hours    MEDICATIONS  (PRN):  acetaminophen     Tablet .. 650 milliGRAM(s) Oral every 6 hours PRN Temp greater or equal to 38C (100.4F), Mild Pain (1 - 3)  aluminum hydroxide/magnesium hydroxide/simethicone Suspension 30 milliLiter(s) Oral every 4 hours PRN Dyspepsia  morphine  - Injectable 1 milliGRAM(s) IV Push every 6 hours PRN Moderate Pain (4 - 6)  morphine  - Injectable 2 milliGRAM(s) IV Push every 6 hours PRN Severe Pain (7 - 10)  ondansetron Injectable 4 milliGRAM(s) IV Push every 8 hours PRN Nausea and/or Vomiting      Vital Signs Last 24 Hrs  T(C): 36.4 (05 Nov 2022 05:22), Max: 37 (04 Nov 2022 13:08)  T(F): 97.6 (05 Nov 2022 05:22), Max: 98.6 (04 Nov 2022 13:08)  HR: 73 (05 Nov 2022 05:22) (70 - 78)  BP: 131/70 (05 Nov 2022 05:22) (131/70 - 166/79)  BP(mean): --  RR: 17 (05 Nov 2022 05:22) (16 - 18)  SpO2: 98% (05 Nov 2022 05:22) (94% - 98%)    Parameters below as of 05 Nov 2022 05:22  Patient On (Oxygen Delivery Method): room air      I&O's Detail        Physical Exam:  General: WN/WD NAD  Respiratory: airway patent, respirations unlabored, no increased WoB  Neurology: A&Ox3, nonfocal, TURNER x 4  Abdominal: Soft, mild epigastric TTP, no rebound/guarding      LABS:                        13.4   12.51 )-----------( 283      ( 05 Nov 2022 07:29 )             41.1     11-05    141  |  106  |  10  ----------------------------<  67<L>  3.5   |  20<L>  |  0.48<L>    Ca    8.4      05 Nov 2022 07:29  Phos  2.6     11-05  Mg     2.1     11-05    TPro  5.6<L>  /  Alb  1.9<L>  /  TBili  0.5  /  DBili  x   /  AST  15  /  ALT  19  /  AlkPhos  112  11-05    PT/INR - ( 04 Nov 2022 08:10 )   PT: 18.3 sec;   INR: 1.53 ratio         PTT - ( 04 Nov 2022 08:10 )  PTT:30.0 sec      RADIOLOGY & ADDITIONAL STUDIES:

## 2022-11-06 LAB
ALBUMIN SERPL ELPH-MCNC: 1.8 G/DL — LOW (ref 3.5–5)
ALP SERPL-CCNC: 101 U/L — SIGNIFICANT CHANGE UP (ref 40–120)
ALT FLD-CCNC: 19 U/L DA — SIGNIFICANT CHANGE UP (ref 10–60)
ANION GAP SERPL CALC-SCNC: 13 MMOL/L — SIGNIFICANT CHANGE UP (ref 5–17)
AST SERPL-CCNC: 15 U/L — SIGNIFICANT CHANGE UP (ref 10–40)
BILIRUB SERPL-MCNC: 0.4 MG/DL — SIGNIFICANT CHANGE UP (ref 0.2–1.2)
BUN SERPL-MCNC: 7 MG/DL — SIGNIFICANT CHANGE UP (ref 7–18)
CALCIUM SERPL-MCNC: 8.4 MG/DL — SIGNIFICANT CHANGE UP (ref 8.4–10.5)
CHLORIDE SERPL-SCNC: 106 MMOL/L — SIGNIFICANT CHANGE UP (ref 96–108)
CO2 SERPL-SCNC: 23 MMOL/L — SIGNIFICANT CHANGE UP (ref 22–31)
CREAT SERPL-MCNC: 0.4 MG/DL — LOW (ref 0.5–1.3)
EGFR: 101 ML/MIN/1.73M2 — SIGNIFICANT CHANGE UP
GLUCOSE SERPL-MCNC: 98 MG/DL — SIGNIFICANT CHANGE UP (ref 70–99)
HCT VFR BLD CALC: 40.2 % — SIGNIFICANT CHANGE UP (ref 34.5–45)
HGB BLD-MCNC: 13.6 G/DL — SIGNIFICANT CHANGE UP (ref 11.5–15.5)
MAGNESIUM SERPL-MCNC: 2.1 MG/DL — SIGNIFICANT CHANGE UP (ref 1.6–2.6)
MCHC RBC-ENTMCNC: 28.9 PG — SIGNIFICANT CHANGE UP (ref 27–34)
MCHC RBC-ENTMCNC: 33.8 GM/DL — SIGNIFICANT CHANGE UP (ref 32–36)
MCV RBC AUTO: 85.4 FL — SIGNIFICANT CHANGE UP (ref 80–100)
NRBC # BLD: 0 /100 WBCS — SIGNIFICANT CHANGE UP (ref 0–0)
PHOSPHATE SERPL-MCNC: 2.2 MG/DL — LOW (ref 2.5–4.5)
PLATELET # BLD AUTO: 309 K/UL — SIGNIFICANT CHANGE UP (ref 150–400)
POTASSIUM SERPL-MCNC: 3.3 MMOL/L — LOW (ref 3.5–5.3)
POTASSIUM SERPL-SCNC: 3.3 MMOL/L — LOW (ref 3.5–5.3)
PROT SERPL-MCNC: 5.6 G/DL — LOW (ref 6–8.3)
RBC # BLD: 4.71 M/UL — SIGNIFICANT CHANGE UP (ref 3.8–5.2)
RBC # FLD: 12.9 % — SIGNIFICANT CHANGE UP (ref 10.3–14.5)
SARS-COV-2 RNA SPEC QL NAA+PROBE: SIGNIFICANT CHANGE UP
SODIUM SERPL-SCNC: 142 MMOL/L — SIGNIFICANT CHANGE UP (ref 135–145)
WBC # BLD: 13.26 K/UL — HIGH (ref 3.8–10.5)
WBC # FLD AUTO: 13.26 K/UL — HIGH (ref 3.8–10.5)

## 2022-11-06 RX ORDER — POTASSIUM CHLORIDE 20 MEQ
40 PACKET (EA) ORAL ONCE
Refills: 0 | Status: COMPLETED | OUTPATIENT
Start: 2022-11-06 | End: 2022-11-06

## 2022-11-06 RX ORDER — POTASSIUM PHOSPHATE, MONOBASIC POTASSIUM PHOSPHATE, DIBASIC 236; 224 MG/ML; MG/ML
15 INJECTION, SOLUTION INTRAVENOUS ONCE
Refills: 0 | Status: COMPLETED | OUTPATIENT
Start: 2022-11-06 | End: 2022-11-06

## 2022-11-06 RX ORDER — SODIUM CHLORIDE 9 MG/ML
1000 INJECTION, SOLUTION INTRAVENOUS
Refills: 0 | Status: DISCONTINUED | OUTPATIENT
Start: 2022-11-06 | End: 2022-11-09

## 2022-11-06 RX ADMIN — Medication 40 MILLIEQUIVALENT(S): at 10:21

## 2022-11-06 RX ADMIN — MEROPENEM 100 MILLIGRAM(S): 1 INJECTION INTRAVENOUS at 21:40

## 2022-11-06 RX ADMIN — POTASSIUM PHOSPHATE, MONOBASIC POTASSIUM PHOSPHATE, DIBASIC 62.5 MILLIMOLE(S): 236; 224 INJECTION, SOLUTION INTRAVENOUS at 10:22

## 2022-11-06 RX ADMIN — MEROPENEM 100 MILLIGRAM(S): 1 INJECTION INTRAVENOUS at 05:49

## 2022-11-06 RX ADMIN — MEROPENEM 100 MILLIGRAM(S): 1 INJECTION INTRAVENOUS at 16:03

## 2022-11-06 RX ADMIN — SODIUM CHLORIDE 200 MILLILITER(S): 9 INJECTION, SOLUTION INTRAVENOUS at 10:22

## 2022-11-06 NOTE — PROGRESS NOTE ADULT - SUBJECTIVE AND OBJECTIVE BOX
PGY1 Progress Note discussed with attending     PAGER #: [phone]] TILL 5:00 PM  PLEASE CONTACT ON CALL TEAM:  - On Call Team (Please refer to Elizabeth) FROM 5:00 PM - 8:30PM  - Nightfloat Team FROM 8:30 -7:30 AM    CHIEF COMPLAINT & BRIEF HOSPITAL COURSE:    INTERVAL HPI/OVERNIGHT EVENTS: No acute events noted overnight. Patient is for ERCP tomorrow 11/7/22. Patient states abdominal pain has been getting better. Denies nausea or emesis.       REVIEW OF SYSTEMS:  CONSTITUTIONAL: No fever, weight loss, or fatigue  RESPIRATORY: No cough, wheezing, chills or hemoptysis; No shortness of breath  CARDIOVASCULAR: No chest pain, palpitations, dizziness, or leg swelling  GASTROINTESTINAL: Has abdominal pain. No nausea, vomiting, or hematemesis; No diarrhea or constipation. No melena or hematochezia.  GENITOURINARY: No dysuria or hematuria, urinary frequency  NEUROLOGICAL: No headaches, memory loss, loss of strength, numbness, or tremors  SKIN: No itching, burning, rashes, or lesions     MEDICATIONS  (STANDING):  lactated ringers. 1000 milliLiter(s) (200 mL/Hr) IV Continuous <Continuous>  meropenem  IVPB      meropenem  IVPB 1000 milliGRAM(s) IV Intermittent every 8 hours  potassium chloride   Powder 40 milliEquivalent(s) Oral once  potassium phosphate IVPB 15 milliMole(s) IV Intermittent once    MEDICATIONS  (PRN):  acetaminophen     Tablet .. 650 milliGRAM(s) Oral every 6 hours PRN Temp greater or equal to 38C (100.4F), Mild Pain (1 - 3)  aluminum hydroxide/magnesium hydroxide/simethicone Suspension 30 milliLiter(s) Oral every 4 hours PRN Dyspepsia  morphine  - Injectable 1 milliGRAM(s) IV Push every 6 hours PRN Moderate Pain (4 - 6)  ondansetron Injectable 4 milliGRAM(s) IV Push every 8 hours PRN Nausea and/or Vomiting    Vital Signs Last 24 Hrs  T(C): 36.8 (06 Nov 2022 04:30), Max: 36.8 (06 Nov 2022 04:30)  T(F): 98.2 (06 Nov 2022 04:30), Max: 98.2 (06 Nov 2022 04:30)  HR: 70 (06 Nov 2022 04:30) (70 - 73)  BP: 151/73 (06 Nov 2022 04:30) (150/73 - 154/71)  BP(mean): --  RR: 18 (06 Nov 2022 04:30) (16 - 18)  SpO2: 95% (06 Nov 2022 04:30) (95% - 96%)    Parameters below as of 06 Nov 2022 04:30  Patient On (Oxygen Delivery Method): room air        PHYSICAL EXAMINATION:  GENERAL: NAD, well built  HEAD:  Atraumatic, Normocephalic  EYES:  conjunctiva and sclera clear  NECK: Supple, No JVD, Normal thyroid  CHEST/LUNG: Clear to auscultation. Clear to percussion bilaterally; No rales, rhonchi, wheezing, or rubs  HEART: Regular rate and rhythm; No murmurs, rubs, or gallops  ABDOMEN: Soft, less tender in epigastric region, Nondistended; Bowel sounds present  NERVOUS SYSTEM:  Alert & Oriented X3,    EXTREMITIES:  2+ Peripheral Pulses, No clubbing, cyanosis, or edema  SKIN: warm dry                                     13.6   13.26 )-----------( 309      ( 06 Nov 2022 07:07 )             40.2     11-06    142  |  106  |  7   ----------------------------<  98  3.3<L>   |  23  |  0.40<L>    Ca    8.4      06 Nov 2022 07:07  Phos  2.2     11-06  Mg     2.1     11-06    TPro  5.6<L>  /  Alb  1.8<L>  /  TBili  0.4  /  DBili  x   /  AST  15  /  ALT  19  /  AlkPhos  101  11-06    LIVER FUNCTIONS - ( 06 Nov 2022 07:07 )  Alb: 1.8 g/dL / Pro: 5.6 g/dL / ALK PHOS: 101 U/L / ALT: 19 U/L DA / AST: 15 U/L / GGT: x                   CAPILLARY BLOOD GLUCOSE      RADIOLOGY & ADDITIONAL TESTS:

## 2022-11-06 NOTE — PROGRESS NOTE ADULT - ASSESSMENT
Pt returned call, please call Yessenia.     Patient is a 77y old  Female from home, ambulates independently, with no significant PMHx, presents to the ER for evaluation of worsening abdominal pain x 3d. She states her abdominal pain started on Thursday, after taking new abx, amoxicillin  started on Monday after a recent dental removal. She describes abdominal pain to radiate to her back with sudden nausea and vomiting waking her up on Friday morning. She states that she has not been able to hold any food/ drink down. ON Admission, she found to have no fever but Leukocytosis. The CT abd/pelvis shows Acute Pancreatitis. The ID consult requested to assist with further evaluation and antibiotic management.     # Acute Pancreatitis- most likely Gallstone pancreatitis- in the setting of Cholelithiasis seen on abdominal US from 11/1/22- MRCP as of 11/2 shows Peripancreatic edema, compatible with acute pancreatitis. 5.9 x 2.4 cm  area of walled-off necrosis at the pancreatic body/tail. Cholelithiasis  and  5 mm distal common bile duct stone.   # Leukocytosis - resolving   # Positive Urine culture- most likely a contaminant in the setting of Negative Urine analysis    would recommend:    1. NPO after Midnight for ERCP in AM  2. Continue Meropenem since MRCP shows area of walled-off necrosis at the pancreatic body/tail.  3. Monitor WBC count, stay elevated  4. May benefit from cholecystectomy after ERCP  5. OOB to chair    d/w patient and Nursing staff     Attending Attestation:    Spent more than 35 minutes on total encounter, more than 50 % of the visit was spent counseling and/or coordinating care by the Attending physician.

## 2022-11-06 NOTE — PROGRESS NOTE ADULT - ASSESSMENT
Refill approved for 1 month.  Mirlande Antonio needs to be seen for an appointment before further refills are given.     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 can plan for outpatient intervention in 6-8 weeks after inflammation improves  Continue IVF hydration, monitor UOP, I/Os  Slow advancement of diet, having some discomfort with CLD would recommend continuing clears for now  Will follow along

## 2022-11-06 NOTE — PROGRESS NOTE ADULT - SUBJECTIVE AND OBJECTIVE BOX
Patient is seen and examined at the bed side, is afebrile. She feels better today, abdominal pain better and tolerated the diet better.       REVIEW OF SYSTEMS: All other review systems are negative      ALLERGIES: No Known Allergies      Vital Signs Last 24 Hrs  T(C): 36.7 (2022 19:33), Max: 37.2 (2022 12:26)  T(F): 98.1 (:33), Max: 98.9 (2022 12:26)  HR: 75 (:33) (70 - 77)  BP: 136/79 (:33) (136/79 - 151/73)  BP(mean): --  RR: 18 (:33) (16 - 18)  SpO2: 96% (:33) (95% - 96%)    Parameters below as of :33  Patient On (Oxygen Delivery Method): room air        PHYSICAL EXAM:  GENERAL: Not in distress   CHEST/LUNG: Not using accessory muscles   HEART: s1 and s2 present  ABDOMEN:  epigastric and left sided tenderness positive  EXTREMITIES: No pedal  edema  CNS: Awake and Alert      LABS:                         13.6   13.26 )-----------( 309      ( 2022 07:07 )             40.2                           13.4   12.51 )-----------( 283      ( 2022 07:29 )             41.1                      142  |  106  |  7   ----------------------------<  98  3.3<L>   |  23  |  0.40<L>    Ca    8.4      2022 07:07  Phos  2.2       Mg     2.1         TPro  5.6<L>  /  Alb  1.8<L>  /  TBili  0.4  /  DBili  x   /  AST  15  /  ALT  19  /  AlkPhos  101      141  |  106  |  10  ----------------------------<  67<L>  3.5   |  20<L>  |  0.48<L>    Ca    8.4      2022 07:29  Phos  2.6       Mg     2.1         TPro  5.6<L>  /  Alb  1.9<L>  /  TBili  0.5  /  DBili  x   /  AST  15  /  ALT  19  /  AlkPhos  112        Urinalysis Basic - ( 29 Oct 2022 14:40 )  Color: Raina / Appearance: Clear / S.025 / pH: x  Gluc: x / Ketone: Small  / Bili: Negative / Urobili: Negative   Blood: x / Protein: 100 / Nitrite: Negative   Leuk Esterase: Trace / RBC: 5-10 /HPF / WBC 0-2 /HPF   Sq Epi: x / Non Sq Epi: Negative /HPF / Bacteria: Negative /HPF        MEDICATIONS  (STANDING):    lactated ringers. 1000 milliLiter(s) (200 mL/Hr) IV Continuous <Continuous>  meropenem  IVPB      meropenem  IVPB 1000 milliGRAM(s) IV Intermittent every 8 hours      RADIOLOGY & ADDITIONAL TESTS:    22: MR MRCP w/wo IV Cont (22 @ 13:10) The common bile duct measures up to 6 mm in caliber which is within   normal limits. 5 mm distal common bile duct stone. Cholelithiasis.    Peripancreatic edema, compatible with acute pancreatitis. 5.9 x 2.4 cm  area of walled-off necrosis at the pancreatic body/tail.    Small ascites.  Mild bilateral pleural effusions.      22: US Abdomen Upper Quadrant Right (22 @ 11:28) Cholelithiasis without evidence of thickened gallbladder wall,   pericholecystic fluid or ultrasonic Gonzalez's sign.    Mild peripancreatic fluid, consistent with patient's known acute pancreatitis.      10/29/22: CT Abdomen and Pelvis w/ IV Cont (10.29.22 @ 13:37) >Acute pancreatitis with focal area of decreased enhancement in the   proximal anterior pancreatic body.        MICROBIOLOGY DATA:    Culture - Urine (10.29.22 @ 14:40)   - Amikacin: S <=16   - Amoxicillin/Clavulanic Acid: S <=8/   - Ampicillin: R >16 These ampicillin results predict results for amoxicillin   - Ampicillin/Sulbactam: S  Enterobacter, Klebsiella aerogenes, Citrobacter, and Serratia may develop resistance during prolonged therapy (3-4 days)   - Aztreonam: S <=4   - Cefazolin: S <=2 For uncomplicated UTI with K. pneumoniae, E. coli, or P. mirablis: JACINTO <=16 is sensitive and JACINTO >=32 is resistant. This also predicts results for oral agents cefaclor, cefdinir, cefpodoxime, cefprozil, cefuroxime axetil, cephalexin and locarbef for uncomplicated UTI. Note that some isolates may be susceptible to these agents while testing resistant to cefazolin.   - Cefepime: S <=2   - Cefoxitin: S <=8   - Ceftriaxone: S <=1 Enterobacter, Klebsiella aerogenes, Citrobacter, and Serratia may develop resistance during prolonged therapy   - Ciprofloxacin: S <=0.25   - Ertapenem: S <=0.5   - Gentamicin: S <=2   - Imipenem: S <=1   - Levofloxacin: S <=0.5   - Meropenem: S <=1   - Nitrofurantoin: I 64 Should not be used to treat pyelonephritis   - Piperacillin/Tazobactam: S <=8   - Tigecycline: S <=2   - Tobramycin: S <=2   - Trimethoprim/Sulfamethoxazole: S <=0.5/9.5   Specimen Source: Clean Catch Clean Catch (Midstream)   Culture Results:   10,000 - 49,000 CFU/mL Klebsiella pneumoniae   <10,000 CFU/ml Normal Urogenital vito present   Organism Identification: Klebsiella pneumoniae   Organism: Klebsiella pneumoniae   COVID-19 PCR . (10.29.22 @ 14:40)   COVID-19 PCR: NotDetec:

## 2022-11-06 NOTE — PROGRESS NOTE ADULT - SUBJECTIVE AND OBJECTIVE BOX
DATE OF SERVICE: 11-06-22 @ 08:37    INTERVAL HPI/OVERNIGHT EVENTS:  Had some abdominal pain yesterday after eating, relieved with maalox and tylenol. feels ok today.    MEDICATIONS  (STANDING):  lactated ringers. 1000 milliLiter(s) (200 mL/Hr) IV Continuous <Continuous>  meropenem  IVPB      meropenem  IVPB 1000 milliGRAM(s) IV Intermittent every 8 hours    MEDICATIONS  (PRN):  acetaminophen     Tablet .. 650 milliGRAM(s) Oral every 6 hours PRN Temp greater or equal to 38C (100.4F), Mild Pain (1 - 3)  aluminum hydroxide/magnesium hydroxide/simethicone Suspension 30 milliLiter(s) Oral every 4 hours PRN Dyspepsia  morphine  - Injectable 1 milliGRAM(s) IV Push every 6 hours PRN Moderate Pain (4 - 6)  ondansetron Injectable 4 milliGRAM(s) IV Push every 8 hours PRN Nausea and/or Vomiting      Vital Signs Last 24 Hrs  T(C): 36.8 (06 Nov 2022 04:30), Max: 36.8 (06 Nov 2022 04:30)  T(F): 98.2 (06 Nov 2022 04:30), Max: 98.2 (06 Nov 2022 04:30)  HR: 70 (06 Nov 2022 04:30) (70 - 73)  BP: 151/73 (06 Nov 2022 04:30) (150/73 - 154/71)  BP(mean): --  RR: 18 (06 Nov 2022 04:30) (16 - 18)  SpO2: 95% (06 Nov 2022 04:30) (95% - 96%)    Parameters below as of 06 Nov 2022 04:30  Patient On (Oxygen Delivery Method): room air      I&O's Detail        Physical Exam:  General: WN/WD NAD  Respiratory: airway patent, respirations unlabored, no increased WoB  Neurology: A&Ox3, nonfocal, TURNER x 4  Abdominal: Soft, mild epigastric TTP, no rebound/guarding      LABS:                        13.6   13.26 )-----------( 309      ( 06 Nov 2022 07:07 )             40.2     11-06    142  |  106  |  7   ----------------------------<  98  3.3<L>   |  23  |  0.40<L>    Ca    8.4      06 Nov 2022 07:07  Phos  2.2     11-06  Mg     2.1     11-06    TPro  5.6<L>  /  Alb  1.8<L>  /  TBili  0.4  /  DBili  x   /  AST  15  /  ALT  19  /  AlkPhos  101  11-06          RADIOLOGY & ADDITIONAL STUDIES:

## 2022-11-06 NOTE — PROGRESS NOTE ADULT - SUBJECTIVE AND OBJECTIVE BOX
[   ] ICU                                          [   ] CCU                                      [  X ] Medical Floor        Patient is a 77 year old female with abdominal pain. GI consulted to evaluate.         77 year old female from home, ambulates independently, with out significant past medical history presented with 3 days history of progressively worsening sharp constant, 8/10 intensity epigastric abdominal pain radiating to her back associated with nausea, non bloody vomiting. Patient reports abdominal pain started after starting taking Amoxacillin for her recent dental removal. Patient denies hematemesis, hematochezia, melena, fever, chills, chest pain, SOB, cough, hematuria, dysuria, diarrhea or taking ETOH.    Patient is comfortable. No new complaints reported, No abdominal pain, N/V, hematemesis, hematochezia, melena, fever, chills, chest pain, SOB, cough or diarrhea reported.           PAIN MANAGEMENT:  Pain Scale:                5/10  Pain Location:  Epigastric abdominal pain      Prior Colonoscopy:  No prior colonoscopy      PAST MEDICAL HISTORY  No significant past medical history reported      PAST SURGICAL HISTORY  No significant surgical history reported      Allergies    No Known Allergies    Intolerances  None       SOCIAL HISTORY  Advanced Directives:       [ X ] Full Code       [  ] DNR  Marital Status:         [  ] M      [ X ] S      [  ] D       [  ] W  Children:       [ X ] Yes      [  ] No  Occupation:        [  ] Employed       [ X] Unemployed       [  ] Retired  Diet:       [ X ] Regular       [  ] PEG feeding          [  ] NG tube feeding  Drug Use:           [ X ] Patient denied          [  ] Yes  Alcohol:           [  X] No             [  ] Yes (socially)         [  ] Yes (chronic)  Tobacco:           [  ] Yes           [  X] No      FAMILY HISTORY  [ X ] Heart Disease            [ X ] Diabetes             [ X ] HTN             [  ] Colon Cancer             [  ] Stomach Cancer              [  ] Pancreatic Cancer        VITALS  Vital Signs Last 24 Hrs  T(C): 36.8 (06 Nov 2022 04:30), Max: 36.8 (06 Nov 2022 04:30)  T(F): 98.2 (06 Nov 2022 04:30), Max: 98.2 (06 Nov 2022 04:30)  HR: 70 (06 Nov 2022 04:30) (70 - 73)  BP: 151/73 (06 Nov 2022 04:30) (150/73 - 154/71)   RR: 18 (06 Nov 2022 04:30) (16 - 18)  SpO2: 95% (06 Nov 2022 04:30) (95% - 96%)  Parameters below as of 06 Nov 2022 04:30  Patient On (Oxygen Delivery Method): room air         MEDICATIONS  (STANDING):  lactated ringers. 1000 milliLiter(s) (200 mL/Hr) IV Continuous <Continuous>  meropenem  IVPB      meropenem  IVPB 1000 milliGRAM(s) IV Intermittent every 8 hours    MEDICATIONS  (PRN):  acetaminophen     Tablet .. 650 milliGRAM(s) Oral every 6 hours PRN Temp greater or equal to 38C (100.4F), Mild Pain (1 - 3)  aluminum hydroxide/magnesium hydroxide/simethicone Suspension 30 milliLiter(s) Oral every 4 hours PRN Dyspepsia  morphine  - Injectable 1 milliGRAM(s) IV Push every 6 hours PRN Moderate Pain (4 - 6)  ondansetron Injectable 4 milliGRAM(s) IV Push every 8 hours PRN Nausea and/or Vomiting                            13.6   13.26 )-----------( 309      ( 06 Nov 2022 07:07 )             40.2       11-06    142  |  106  |  7   ----------------------------<  98  3.3<L>   |  23  |  0.40<L>    Ca    8.4      06 Nov 2022 07:07  Phos  2.2     11-06  Mg     2.1     11-06    TPro  5.6<L>  /  Alb  1.8<L>  /  TBili  0.4  /  DBili  x   /  AST  15  /  ALT  19  /  AlkPhos  101  11-06

## 2022-11-07 ENCOUNTER — TRANSCRIPTION ENCOUNTER (OUTPATIENT)
Age: 77
End: 2022-11-07

## 2022-11-07 LAB
ALBUMIN SERPL ELPH-MCNC: 1.7 G/DL — LOW (ref 3.5–5)
ALP SERPL-CCNC: 85 U/L — SIGNIFICANT CHANGE UP (ref 40–120)
ALT FLD-CCNC: 16 U/L DA — SIGNIFICANT CHANGE UP (ref 10–60)
ANION GAP SERPL CALC-SCNC: 7 MMOL/L — SIGNIFICANT CHANGE UP (ref 5–17)
APTT BLD: 30.1 SEC — SIGNIFICANT CHANGE UP (ref 27.5–35.5)
AST SERPL-CCNC: 13 U/L — SIGNIFICANT CHANGE UP (ref 10–40)
BILIRUB SERPL-MCNC: 0.3 MG/DL — SIGNIFICANT CHANGE UP (ref 0.2–1.2)
BLD GP AB SCN SERPL QL: SIGNIFICANT CHANGE UP
BUN SERPL-MCNC: 5 MG/DL — LOW (ref 7–18)
CALCIUM SERPL-MCNC: 8.6 MG/DL — SIGNIFICANT CHANGE UP (ref 8.4–10.5)
CHLORIDE SERPL-SCNC: 106 MMOL/L — SIGNIFICANT CHANGE UP (ref 96–108)
CO2 SERPL-SCNC: 29 MMOL/L — SIGNIFICANT CHANGE UP (ref 22–31)
CREAT SERPL-MCNC: 0.45 MG/DL — LOW (ref 0.5–1.3)
EGFR: 98 ML/MIN/1.73M2 — SIGNIFICANT CHANGE UP
GLUCOSE SERPL-MCNC: 103 MG/DL — HIGH (ref 70–99)
HCT VFR BLD CALC: 37 % — SIGNIFICANT CHANGE UP (ref 34.5–45)
HGB BLD-MCNC: 12.4 G/DL — SIGNIFICANT CHANGE UP (ref 11.5–15.5)
INR BLD: 1.45 RATIO — HIGH (ref 0.88–1.16)
MAGNESIUM SERPL-MCNC: 2 MG/DL — SIGNIFICANT CHANGE UP (ref 1.6–2.6)
MCHC RBC-ENTMCNC: 28.8 PG — SIGNIFICANT CHANGE UP (ref 27–34)
MCHC RBC-ENTMCNC: 33.5 GM/DL — SIGNIFICANT CHANGE UP (ref 32–36)
MCV RBC AUTO: 86 FL — SIGNIFICANT CHANGE UP (ref 80–100)
NRBC # BLD: 0 /100 WBCS — SIGNIFICANT CHANGE UP (ref 0–0)
PHOSPHATE SERPL-MCNC: 2.5 MG/DL — SIGNIFICANT CHANGE UP (ref 2.5–4.5)
PLATELET # BLD AUTO: 319 K/UL — SIGNIFICANT CHANGE UP (ref 150–400)
POTASSIUM SERPL-MCNC: 3.8 MMOL/L — SIGNIFICANT CHANGE UP (ref 3.5–5.3)
POTASSIUM SERPL-SCNC: 3.8 MMOL/L — SIGNIFICANT CHANGE UP (ref 3.5–5.3)
PROT SERPL-MCNC: 5.2 G/DL — LOW (ref 6–8.3)
PROTHROM AB SERPL-ACNC: 17.3 SEC — HIGH (ref 10.5–13.4)
RBC # BLD: 4.3 M/UL — SIGNIFICANT CHANGE UP (ref 3.8–5.2)
RBC # FLD: 13 % — SIGNIFICANT CHANGE UP (ref 10.3–14.5)
SODIUM SERPL-SCNC: 142 MMOL/L — SIGNIFICANT CHANGE UP (ref 135–145)
WBC # BLD: 11.07 K/UL — HIGH (ref 3.8–10.5)
WBC # FLD AUTO: 11.07 K/UL — HIGH (ref 3.8–10.5)

## 2022-11-07 PROCEDURE — 43262 ENDO CHOLANGIOPANCREATOGRAPH: CPT

## 2022-11-07 PROCEDURE — 43264 ERCP REMOVE DUCT CALCULI: CPT

## 2022-11-07 DEVICE — BLLN EXTRCTR PRO RX-S 12-15MM: Type: IMPLANTABLE DEVICE | Status: FUNCTIONAL

## 2022-11-07 DEVICE — HYDRATOME 44: Type: IMPLANTABLE DEVICE | Status: FUNCTIONAL

## 2022-11-07 RX ORDER — FENTANYL CITRATE 50 UG/ML
25 INJECTION INTRAVENOUS
Refills: 0 | Status: DISCONTINUED | OUTPATIENT
Start: 2022-11-07 | End: 2022-11-07

## 2022-11-07 RX ORDER — INDOMETHACIN 50 MG
100 CAPSULE ORAL ONCE
Refills: 0 | Status: COMPLETED | OUTPATIENT
Start: 2022-11-07 | End: 2022-11-07

## 2022-11-07 RX ORDER — PANTOPRAZOLE SODIUM 20 MG/1
40 TABLET, DELAYED RELEASE ORAL DAILY
Refills: 0 | Status: DISCONTINUED | OUTPATIENT
Start: 2022-11-07 | End: 2022-11-09

## 2022-11-07 RX ADMIN — Medication 100 MILLIGRAM(S): at 14:46

## 2022-11-07 RX ADMIN — MEROPENEM 100 MILLIGRAM(S): 1 INJECTION INTRAVENOUS at 13:19

## 2022-11-07 RX ADMIN — PANTOPRAZOLE SODIUM 40 MILLIGRAM(S): 20 TABLET, DELAYED RELEASE ORAL at 17:53

## 2022-11-07 RX ADMIN — SODIUM CHLORIDE 200 MILLILITER(S): 9 INJECTION, SOLUTION INTRAVENOUS at 00:20

## 2022-11-07 RX ADMIN — MEROPENEM 100 MILLIGRAM(S): 1 INJECTION INTRAVENOUS at 21:45

## 2022-11-07 RX ADMIN — MEROPENEM 100 MILLIGRAM(S): 1 INJECTION INTRAVENOUS at 05:17

## 2022-11-07 NOTE — PROGRESS NOTE ADULT - SUBJECTIVE AND OBJECTIVE BOX
Patient is seen and examined at the bed side, is afebrile. She is scheduled for ERCP today. The Leukocytosis trended down to normal.       REVIEW OF SYSTEMS: All other review systems are negative      ALLERGIES: No Known Allergies      Vital Signs Last 24 Hrs  T(C): 36.5 (2022 20:04), Max: 37 (2022 04:30)  T(F): 97.7 (2022 20:04), Max: 98.6 (2022 04:30)  HR: 80 (2022 20:04) (68 - 80)  BP: 134/68 (2022 20:04) (104/70 - 165/61)  BP(mean): --  RR: 18 (:04) (16 - 22)  SpO2: 92% (2022 20:04) (86% - 99%)    Parameters below as of 2022 20:04  Patient On (Oxygen Delivery Method): room air        PHYSICAL EXAM:  GENERAL: Not in distress   CHEST/LUNG: Not using accessory muscles   HEART: s1 and s2 present  ABDOMEN:  epigastric and left sided tenderness improving   EXTREMITIES: No pedal  edema  CNS: Awake and Alert      LABS:                         12.4   11.07 )-----------( 319      ( 2022 06:45 )             37.0                           13.6   13.26 )-----------( 309      ( 2022 07:07 )             40.2                  142  |  106  |  5<L>  ----------------------------<  103<H>  3.8   |  29  |  0.45<L>    Ca    8.6      2022 06:45  Phos  2.5       Mg     2.0         TPro  5.2<L>  /  Alb  1.7<L>  /  TBili  0.3  /  DBili  x   /  AST  13  /  ALT  16  /  AlkPhos  85      142  |  106  |  7   ----------------------------<  98  3.3<L>   |  23  |  0.40<L>    Ca    8.4      2022 07:07  Phos  2.2       Mg     2.1         TPro  5.6<L>  /  Alb  1.8<L>  /  TBili  0.4  /  DBili  x   /  AST  15  /  ALT  19  /  AlkPhos  101          Urinalysis Basic - ( 29 Oct 2022 14:40 )  Color: Raina / Appearance: Clear / S.025 / pH: x  Gluc: x / Ketone: Small  / Bili: Negative / Urobili: Negative   Blood: x / Protein: 100 / Nitrite: Negative   Leuk Esterase: Trace / RBC: 5-10 /HPF / WBC 0-2 /HPF   Sq Epi: x / Non Sq Epi: Negative /HPF / Bacteria: Negative /HPF        MEDICATIONS  (STANDING):    lactated ringers. 1000 milliLiter(s) (200 mL/Hr) IV Continuous <Continuous>  meropenem  IVPB      meropenem  IVPB 1000 milliGRAM(s) IV Intermittent every 8 hours  pantoprazole  Injectable 40 milliGRAM(s) IV Push daily      RADIOLOGY & ADDITIONAL TESTS:    22: MR MRCP w/wo IV Cont (22 @ 13:10) The common bile duct measures up to 6 mm in caliber which is within   normal limits. 5 mm distal common bile duct stone. Cholelithiasis.    Peripancreatic edema, compatible with acute pancreatitis. 5.9 x 2.4 cm  area of walled-off necrosis at the pancreatic body/tail.    Small ascites.  Mild bilateral pleural effusions.      22: US Abdomen Upper Quadrant Right (22 @ 11:28) Cholelithiasis without evidence of thickened gallbladder wall,   pericholecystic fluid or ultrasonic Gonzalez's sign.    Mild peripancreatic fluid, consistent with patient's known acute pancreatitis.      10/29/22: CT Abdomen and Pelvis w/ IV Cont (10.29.22 @ 13:37) >Acute pancreatitis with focal area of decreased enhancement in the   proximal anterior pancreatic body.        MICROBIOLOGY DATA:    Culture - Urine (10.29.22 @ 14:40)   - Amikacin: S <=16   - Amoxicillin/Clavulanic Acid: S <=8/4   - Ampicillin: R >16 These ampicillin results predict results for amoxicillin   - Ampicillin/Sulbactam: S  Enterobacter, Klebsiella aerogenes, Citrobacter, and Serratia may develop resistance during prolonged therapy (3-4 days)   - Aztreonam: S <=4   - Cefazolin: S <=2 For uncomplicated UTI with K. pneumoniae, E. coli, or P. mirablis: JACINTO <=16 is sensitive and JACINTO >=32 is resistant. This also predicts results for oral agents cefaclor, cefdinir, cefpodoxime, cefprozil, cefuroxime axetil, cephalexin and locarbef for uncomplicated UTI. Note that some isolates may be susceptible to these agents while testing resistant to cefazolin.   - Cefepime: S <=2   - Cefoxitin: S <=8   - Ceftriaxone: S <=1 Enterobacter, Klebsiella aerogenes, Citrobacter, and Serratia may develop resistance during prolonged therapy   - Ciprofloxacin: S <=0.25   - Ertapenem: S <=0.5   - Gentamicin: S <=2   - Imipenem: S <=1   - Levofloxacin: S <=0.5   - Meropenem: S <=1   - Nitrofurantoin: I 64 Should not be used to treat pyelonephritis   - Piperacillin/Tazobactam: S <=8   - Tigecycline: S <=2   - Tobramycin: S <=2   - Trimethoprim/Sulfamethoxazole: S <=0.5/9.5   Specimen Source: Clean Catch Clean Catch (Midstream)   Culture Results:   10,000 - 49,000 CFU/mL Klebsiella pneumoniae   <10,000 CFU/ml Normal Urogenital vito present   Organism Identification: Klebsiella pneumoniae   Organism: Klebsiella pneumoniae   COVID-19 PCR . (10.29.22 @ 14:40)   COVID-19 PCR: NotDetec:

## 2022-11-07 NOTE — PROGRESS NOTE ADULT - SUBJECTIVE AND OBJECTIVE BOX
DATE OF SERVICE: 11-07-22 @ 07:55    INTERVAL HPI/OVERNIGHT EVENTS:  No events, abdominal pain improved today, able to tolerate more food yesterday. WBC down today    MEDICATIONS  (STANDING):  lactated ringers. 1000 milliLiter(s) (200 mL/Hr) IV Continuous <Continuous>  meropenem  IVPB      meropenem  IVPB 1000 milliGRAM(s) IV Intermittent every 8 hours    MEDICATIONS  (PRN):  acetaminophen     Tablet .. 650 milliGRAM(s) Oral every 6 hours PRN Temp greater or equal to 38C (100.4F), Mild Pain (1 - 3)  aluminum hydroxide/magnesium hydroxide/simethicone Suspension 30 milliLiter(s) Oral every 4 hours PRN Dyspepsia  morphine  - Injectable 1 milliGRAM(s) IV Push every 6 hours PRN Moderate Pain (4 - 6)  ondansetron Injectable 4 milliGRAM(s) IV Push every 8 hours PRN Nausea and/or Vomiting      Vital Signs Last 24 Hrs  T(C): 37 (07 Nov 2022 04:30), Max: 37.2 (06 Nov 2022 12:26)  T(F): 98.6 (07 Nov 2022 04:30), Max: 98.9 (06 Nov 2022 12:26)  HR: 75 (07 Nov 2022 04:30) (75 - 77)  BP: 149/74 (07 Nov 2022 04:30) (136/79 - 149/74)  BP(mean): --  RR: 18 (07 Nov 2022 04:30) (16 - 18)  SpO2: 96% (07 Nov 2022 04:30) (95% - 96%)    Parameters below as of 07 Nov 2022 04:30  Patient On (Oxygen Delivery Method): room air      I&O's Detail        Physical Exam:  General: WN/WD NAD  Respiratory: airway patent, respirations unlabored, no increased WoB  Neurology: A&Ox3, nonfocal, TURNER x 4  Abdominal: Mild epigastric TTP, improving, no rebound/guarding      LABS:                        12.4   11.07 )-----------( 319      ( 07 Nov 2022 06:45 )             37.0     11-07    142  |  106  |  5<L>  ----------------------------<  103<H>  3.8   |  29  |  0.45<L>    Ca    8.6      07 Nov 2022 06:45  Phos  2.5     11-07  Mg     2.0     11-07    TPro  5.2<L>  /  Alb  1.7<L>  /  TBili  0.3  /  DBili  x   /  AST  13  /  ALT  16  /  AlkPhos  85  11-07    PT/INR - ( 07 Nov 2022 06:45 )   PT: 17.3 sec;   INR: 1.45 ratio         PTT - ( 07 Nov 2022 06:45 )  PTT:30.1 sec      RADIOLOGY & ADDITIONAL STUDIES:

## 2022-11-07 NOTE — PROGRESS NOTE ADULT - SUBJECTIVE AND OBJECTIVE BOX
[   ] ICU                                          [   ] CCU                                      [ X  ] Medical Floor      Patient is a 77 year old female with abdominal pain. GI consulted to evaluate.         77 year old female from home, ambulates independently, with out significant past medical history presented with 3 days history of progressively worsening sharp constant, 8/10 intensity epigastric abdominal pain radiating to her back associated with nausea, non bloody vomiting. Patient reports abdominal pain started after starting taking Amoxacillin for her recent dental removal. Patient denies hematemesis, hematochezia, melena, fever, chills, chest pain, SOB, cough, hematuria, dysuria, diarrhea or taking ETOH.    Patient is comfortable. No new complaints reported, No abdominal pain, N/V, hematemesis, hematochezia, melena, fever, chills, chest pain, SOB, cough or diarrhea reported.           PAIN MANAGEMENT:  Pain Scale:                5/10  Pain Location:  Epigastric abdominal pain      Prior Colonoscopy:  No prior colonoscopy      PAST MEDICAL HISTORY  No significant past medical history reported      PAST SURGICAL HISTORY  No significant surgical history reported      Allergies    No Known Allergies    Intolerances  None       SOCIAL HISTORY  Advanced Directives:       [ X ] Full Code       [  ] DNR  Marital Status:         [  ] M      [ X ] S      [  ] D       [  ] W  Children:       [ X ] Yes      [  ] No  Occupation:        [  ] Employed       [ X] Unemployed       [  ] Retired  Diet:       [ X ] Regular       [  ] PEG feeding          [  ] NG tube feeding  Drug Use:           [ X ] Patient denied          [  ] Yes  Alcohol:           [  X] No             [  ] Yes (socially)         [  ] Yes (chronic)  Tobacco:           [  ] Yes           [  X] No      FAMILY HISTORY  [ X ] Heart Disease            [ X ] Diabetes             [ X ] HTN             [  ] Colon Cancer             [  ] Stomach Cancer              [  ] Pancreatic Cancer      VITALS  Vital Signs Last 24 Hrs  T(C): 37 (07 Nov 2022 04:30), Max: 37.2 (06 Nov 2022 12:26)  T(F): 98.6 (07 Nov 2022 04:30), Max: 98.9 (06 Nov 2022 12:26)  HR: 75 (07 Nov 2022 04:30) (75 - 77)  BP: 149/74 (07 Nov 2022 04:30) (136/79 - 149/74)   RR: 18 (07 Nov 2022 04:30) (16 - 18)  SpO2: 96% (07 Nov 2022 04:30) (95% - 96%)  Parameters below as of 07 Nov 2022 04:30  Patient On (Oxygen Delivery Method): room air      MEDICATIONS  (STANDING):  lactated ringers. 1000 milliLiter(s) (200 mL/Hr) IV Continuous <Continuous>  meropenem  IVPB      meropenem  IVPB 1000 milliGRAM(s) IV Intermittent every 8 hours    MEDICATIONS  (PRN):  acetaminophen     Tablet .. 650 milliGRAM(s) Oral every 6 hours PRN Temp greater or equal to 38C (100.4F), Mild Pain (1 - 3)  aluminum hydroxide/magnesium hydroxide/simethicone Suspension 30 milliLiter(s) Oral every 4 hours PRN Dyspepsia  morphine  - Injectable 1 milliGRAM(s) IV Push every 6 hours PRN Moderate Pain (4 - 6)  ondansetron Injectable 4 milliGRAM(s) IV Push every 8 hours PRN Nausea and/or Vomiting                            12.4   11.07 )-----------( 319      ( 07 Nov 2022 06:45 )             37.0       11-07    142  |  106  |  5<L>  ----------------------------<  103<H>  3.8   |  29  |  0.45<L>    Ca    8.6      07 Nov 2022 06:45  Phos  2.5     11-07  Mg     2.0     11-07    TPro  5.2<L>  /  Alb  1.7<L>  /  TBili  0.3  /  DBili  x   /  AST  13  /  ALT  16  /  AlkPhos  85  11-07      PT/INR - ( 07 Nov 2022 06:45 )   PT: 17.3 sec;   INR: 1.45 ratio         PTT - ( 07 Nov 2022 06:45 )  PTT:30.1 sec

## 2022-11-07 NOTE — PROGRESS NOTE ADULT - ASSESSMENT
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 can plan for outpatient intervention in 6-8 weeks after inflammation improves  Continue IVF hydration, monitor UOP, I/Os  NPO for procedure, diet as tolerated pending  Will follow along

## 2022-11-07 NOTE — PROGRESS NOTE ADULT - SUBJECTIVE AND OBJECTIVE BOX
PGY1 Progress Note discussed with attending     PAGER #: [[phone]] TILL 5:00 PM  PLEASE CONTACT ON CALL TEAM:  - On Call Team (Please refer to Elizabeth) FROM 5:00 PM - 8:30PM  - Nightfloat Team FROM 8:30 -7:30 AM    CHIEF COMPLAINT & BRIEF HOSPITAL COURSE:    INTERVAL HPI/OVERNIGHT EVENTS: No acute events noted overnight. Patient states abdominal pain has gotten better. Denies fevers or chills. Denies chest pain, sob or palpitations. Patient is for ERCP 11/7/22.       REVIEW OF SYSTEMS:  CONSTITUTIONAL: No fever, weight loss, or fatigue  RESPIRATORY: No cough, wheezing, chills or hemoptysis; No shortness of breath  CARDIOVASCULAR: No chest pain, palpitations, dizziness, or leg swelling  GASTROINTESTINAL: No abdominal pain. No nausea, vomiting, or hematemesis; No diarrhea or constipation. No melena or hematochezia.  GENITOURINARY: No dysuria or hematuria, urinary frequency  NEUROLOGICAL: No headaches, memory loss, loss of strength, numbness, or tremors  SKIN: No itching, burning, rashes, or lesions     MEDICATIONS  (STANDING):  lactated ringers. 1000 milliLiter(s) (200 mL/Hr) IV Continuous <Continuous>  meropenem  IVPB      meropenem  IVPB 1000 milliGRAM(s) IV Intermittent every 8 hours    MEDICATIONS  (PRN):  acetaminophen     Tablet .. 650 milliGRAM(s) Oral every 6 hours PRN Temp greater or equal to 38C (100.4F), Mild Pain (1 - 3)  aluminum hydroxide/magnesium hydroxide/simethicone Suspension 30 milliLiter(s) Oral every 4 hours PRN Dyspepsia  morphine  - Injectable 1 milliGRAM(s) IV Push every 6 hours PRN Moderate Pain (4 - 6)  ondansetron Injectable 4 milliGRAM(s) IV Push every 8 hours PRN Nausea and/or Vomiting    Vital Signs Last 24 Hrs  T(C): 37 (07 Nov 2022 04:30), Max: 37.2 (06 Nov 2022 12:26)  T(F): 98.6 (07 Nov 2022 04:30), Max: 98.9 (06 Nov 2022 12:26)  HR: 75 (07 Nov 2022 04:30) (75 - 77)  BP: 149/74 (07 Nov 2022 04:30) (136/79 - 149/74)  BP(mean): --  RR: 18 (07 Nov 2022 04:30) (16 - 18)  SpO2: 96% (07 Nov 2022 04:30) (95% - 96%)    Parameters below as of 07 Nov 2022 04:30  Patient On (Oxygen Delivery Method): room air        PHYSICAL EXAMINATION:  GENERAL: NAD, well built  HEAD:  Atraumatic, Normocephalic  EYES:  conjunctiva and sclera clear  NECK: Supple, No JVD, Normal thyroid  CHEST/LUNG: Clear to auscultation. Clear to percussion bilaterally; No rales, rhonchi, wheezing, or rubs  HEART: Regular rate and rhythm; No murmurs, rubs, or gallops  ABDOMEN: Soft, less tender in epigastric region, Nondistended; Bowel sounds present  NERVOUS SYSTEM:  Alert & Oriented X3,    EXTREMITIES:  2+ Peripheral Pulses, No clubbing, cyanosis, or edema  SKIN: warm dry                            12.4   11.07 )-----------( 319      ( 07 Nov 2022 06:45 )             37.0     11-07    142  |  106  |  5<L>  ----------------------------<  103<H>  3.8   |  29  |  0.45<L>    Ca    8.6      07 Nov 2022 06:45  Phos  2.5     11-07  Mg     2.0     11-07    TPro  5.2<L>  /  Alb  1.7<L>  /  TBili  0.3  /  DBili  x   /  AST  13  /  ALT  16  /  AlkPhos  85  11-07    LIVER FUNCTIONS - ( 07 Nov 2022 06:45 )  Alb: 1.7 g/dL / Pro: 5.2 g/dL / ALK PHOS: 85 U/L / ALT: 16 U/L DA / AST: 13 U/L / GGT: x               PT/INR - ( 07 Nov 2022 06:45 )   PT: 17.3 sec;   INR: 1.45 ratio         PTT - ( 07 Nov 2022 06:45 )  PTT:30.1 sec    CAPILLARY BLOOD GLUCOSE      RADIOLOGY & ADDITIONAL TESTS:

## 2022-11-07 NOTE — PROGRESS NOTE ADULT - ASSESSMENT
Patient is a 77y old  Female from home, ambulates independently, with no significant PMHx, presents to the ER for evaluation of worsening abdominal pain x 3d. She states her abdominal pain started on Thursday, after taking new abx, amoxicillin  started on Monday after a recent dental removal. She describes abdominal pain to radiate to her back with sudden nausea and vomiting waking her up on Friday morning. She states that she has not been able to hold any food/ drink down. ON Admission, she found to have no fever but Leukocytosis. The CT abd/pelvis shows Acute Pancreatitis. The ID consult requested to assist with further evaluation and antibiotic management.     # Acute Pancreatitis- most likely Gallstone pancreatitis- in the setting of Cholelithiasis seen on abdominal US from 11/1/22- MRCP as of 11/2 shows Peripancreatic edema, compatible with acute pancreatitis. 5.9 x 2.4 cm  area of walled-off necrosis at the pancreatic body/tail. Cholelithiasis  and  5 mm distal common bile duct stone.   # Leukocytosis - resolving   # Positive Urine culture- most likely a contaminant in the setting of Negative Urine analysis    would recommend:    1. Post - procedure Labs  2. Continue Meropenem since MRCP shows area of walled-off necrosis at the pancreatic body/tail.  3. Monitor WBC count, started trending down  4. May benefit from cholecystectomy after ERCP  5. OOB to chair    d/w patient and Nursing staff     Attending Attestation:    Spent more than 35 minutes on total encounter, more than 50 % of the visit was spent counseling and/or coordinating care by the Attending physician.

## 2022-11-08 DIAGNOSIS — K80.50 CALCULUS OF BILE DUCT WITHOUT CHOLANGITIS OR CHOLECYSTITIS WITHOUT OBSTRUCTION: ICD-10-CM

## 2022-11-08 LAB
ALBUMIN SERPL ELPH-MCNC: 2 G/DL — LOW (ref 3.5–5)
ALP SERPL-CCNC: 84 U/L — SIGNIFICANT CHANGE UP (ref 40–120)
ALT FLD-CCNC: 14 U/L DA — SIGNIFICANT CHANGE UP (ref 10–60)
ANION GAP SERPL CALC-SCNC: 6 MMOL/L — SIGNIFICANT CHANGE UP (ref 5–17)
AST SERPL-CCNC: 12 U/L — SIGNIFICANT CHANGE UP (ref 10–40)
BILIRUB SERPL-MCNC: 0.3 MG/DL — SIGNIFICANT CHANGE UP (ref 0.2–1.2)
BUN SERPL-MCNC: 10 MG/DL — SIGNIFICANT CHANGE UP (ref 7–18)
CALCIUM SERPL-MCNC: 8.5 MG/DL — SIGNIFICANT CHANGE UP (ref 8.4–10.5)
CHLORIDE SERPL-SCNC: 107 MMOL/L — SIGNIFICANT CHANGE UP (ref 96–108)
CO2 SERPL-SCNC: 30 MMOL/L — SIGNIFICANT CHANGE UP (ref 22–31)
CREAT SERPL-MCNC: 0.46 MG/DL — LOW (ref 0.5–1.3)
EGFR: 97 ML/MIN/1.73M2 — SIGNIFICANT CHANGE UP
GLUCOSE SERPL-MCNC: 112 MG/DL — HIGH (ref 70–99)
HCT VFR BLD CALC: 40.1 % — SIGNIFICANT CHANGE UP (ref 34.5–45)
HGB BLD-MCNC: 13.3 G/DL — SIGNIFICANT CHANGE UP (ref 11.5–15.5)
MAGNESIUM SERPL-MCNC: 2.2 MG/DL — SIGNIFICANT CHANGE UP (ref 1.6–2.6)
MCHC RBC-ENTMCNC: 29 PG — SIGNIFICANT CHANGE UP (ref 27–34)
MCHC RBC-ENTMCNC: 33.2 GM/DL — SIGNIFICANT CHANGE UP (ref 32–36)
MCV RBC AUTO: 87.4 FL — SIGNIFICANT CHANGE UP (ref 80–100)
NRBC # BLD: 0 /100 WBCS — SIGNIFICANT CHANGE UP (ref 0–0)
PHOSPHATE SERPL-MCNC: 3.5 MG/DL — SIGNIFICANT CHANGE UP (ref 2.5–4.5)
PLATELET # BLD AUTO: 382 K/UL — SIGNIFICANT CHANGE UP (ref 150–400)
POTASSIUM SERPL-MCNC: 4.6 MMOL/L — SIGNIFICANT CHANGE UP (ref 3.5–5.3)
POTASSIUM SERPL-SCNC: 4.6 MMOL/L — SIGNIFICANT CHANGE UP (ref 3.5–5.3)
PROT SERPL-MCNC: 5.5 G/DL — LOW (ref 6–8.3)
RBC # BLD: 4.59 M/UL — SIGNIFICANT CHANGE UP (ref 3.8–5.2)
RBC # FLD: 12.6 % — SIGNIFICANT CHANGE UP (ref 10.3–14.5)
SODIUM SERPL-SCNC: 143 MMOL/L — SIGNIFICANT CHANGE UP (ref 135–145)
WBC # BLD: 10.65 K/UL — HIGH (ref 3.8–10.5)
WBC # FLD AUTO: 10.65 K/UL — HIGH (ref 3.8–10.5)

## 2022-11-08 PROCEDURE — 99232 SBSQ HOSP IP/OBS MODERATE 35: CPT

## 2022-11-08 RX ORDER — SIMETHICONE 80 MG/1
80 TABLET, CHEWABLE ORAL THREE TIMES A DAY
Refills: 0 | Status: DISCONTINUED | OUTPATIENT
Start: 2022-11-08 | End: 2022-11-09

## 2022-11-08 RX ORDER — HEPARIN SODIUM 5000 [USP'U]/ML
5000 INJECTION INTRAVENOUS; SUBCUTANEOUS EVERY 8 HOURS
Refills: 0 | Status: DISCONTINUED | OUTPATIENT
Start: 2022-11-08 | End: 2022-11-09

## 2022-11-08 RX ADMIN — MEROPENEM 100 MILLIGRAM(S): 1 INJECTION INTRAVENOUS at 05:18

## 2022-11-08 RX ADMIN — MEROPENEM 100 MILLIGRAM(S): 1 INJECTION INTRAVENOUS at 21:40

## 2022-11-08 RX ADMIN — HEPARIN SODIUM 5000 UNIT(S): 5000 INJECTION INTRAVENOUS; SUBCUTANEOUS at 21:40

## 2022-11-08 RX ADMIN — HEPARIN SODIUM 5000 UNIT(S): 5000 INJECTION INTRAVENOUS; SUBCUTANEOUS at 14:37

## 2022-11-08 NOTE — PROGRESS NOTE ADULT - PROBLEM SELECTOR PROBLEM 1
Choledocholithiasis
Acute pancreatitis

## 2022-11-08 NOTE — PROGRESS NOTE ADULT - PROBLEM SELECTOR PLAN 2
p/w WBC >16k, afebrile, Currently downtrending, currently 10.6k  noted to have necrosis on MRCP  will continue with Meropenem  ID consult appreciated
p/w WBC >16k, afebrile, Currently downtrending, currently 11k  noted to have necrosis on MRCP  will continue with Meropenem  ID consult appreciated
p/w WBC >16k, afebrile, Currently downtrending, currently 13k    likely reactive in the setting of acute pancreatitis  hold abx  ID consult appreciated
p/w WBC >16k, afebrile, Currently downtrending, currently 13k    noted to have necrosis on MRCP  will continue with Meropenem  ID consult appreciated
p/w WBC >16k, afebrile, Currently downtrending, currently 13k    noted to have necrosis on MRCP  will continue with Meropenem  ID consult appreciated
p/w WBC >16k, afebrile, Currently downtrending   likely reactive in the setting of acute pancreatitis
p/w WBC >16k, afebrile, Currently downtrending, currently 12k    likely reactive in the setting of acute pancreatitis  hold abx  ID consult appreciated
p/w WBC >16k, afebrile   likely reactive in the setting of acute pancreatitis
p/w WBC >16k, afebrile, Currently downtrending, currently 12k    likely reactive in the setting of acute pancreatitis  hold abx  ID consult appreciated
p/w WBC >16k, afebrile, Currently downtrending, currently 13k    noted to have necrosis on MRCP  will continue with Meropenem  ID consult appreciated

## 2022-11-08 NOTE — PROGRESS NOTE ADULT - ASSESSMENT
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 can plan for outpatient intervention in 6-8 weeks after inflammation improves  Advance diet as tolerated  No additional inpatient surgical intervention, she can follow up with me as an outpatient for elective lap brendon planning  Thank you for involving me in this patient's care, please recall as needed

## 2022-11-08 NOTE — PROGRESS NOTE ADULT - NS ATTEND AMEND GEN_ALL_CORE FT
- Choledocholithiasis.  - Necrotizing pancreatitis.  - Abd pain.    Patient doing well post ERCP. LFTs normal. No new complaints. Advance diet as tolerated. Timing of cholecystectomy per Surgery. Given asymptomatic, no indication for WOPN drainage. Reconsult GI PRN.

## 2022-11-08 NOTE — PROGRESS NOTE ADULT - ASSESSMENT
1. Abdominal pain  2. Acute pancreatitis  3. Gall stone  4. S/p ERCP with CBD stone extraction    Suggestions:    1. Diet as tolerated  2. Surgical evaluation  3. Protonix daily  4. Avoid NSAID  5. Diet as tolerated  6. Pain control  7. DVT prophylaxis

## 2022-11-08 NOTE — PROGRESS NOTE ADULT - SUBJECTIVE AND OBJECTIVE BOX
DATE OF SERVICE: 11-08-22 @ 22:31    INTERVAL HPI/OVERNIGHT EVENTS:  S/p ERCP and stone removal yesterday. Pain much improved today. Tolerating regular diet.    MEDICATIONS  (STANDING):  heparin   Injectable 5000 Unit(s) SubCutaneous every 8 hours  lactated ringers. 1000 milliLiter(s) (200 mL/Hr) IV Continuous <Continuous>  meropenem  IVPB      meropenem  IVPB 1000 milliGRAM(s) IV Intermittent every 8 hours  pantoprazole  Injectable 40 milliGRAM(s) IV Push daily    MEDICATIONS  (PRN):  acetaminophen     Tablet .. 650 milliGRAM(s) Oral every 6 hours PRN Temp greater or equal to 38C (100.4F), Mild Pain (1 - 3)  aluminum hydroxide/magnesium hydroxide/simethicone Suspension 30 milliLiter(s) Oral every 4 hours PRN Dyspepsia  morphine  - Injectable 1 milliGRAM(s) IV Push every 6 hours PRN Moderate Pain (4 - 6)  ondansetron Injectable 4 milliGRAM(s) IV Push every 8 hours PRN Nausea and/or Vomiting  simethicone 80 milliGRAM(s) Chew three times a day PRN Gas      Vital Signs Last 24 Hrs  T(C): 36.3 (08 Nov 2022 20:48), Max: 36.9 (08 Nov 2022 05:31)  T(F): 97.3 (08 Nov 2022 20:48), Max: 98.4 (08 Nov 2022 05:31)  HR: 78 (08 Nov 2022 20:48) (78 - 82)  BP: 120/60 (08 Nov 2022 20:48) (115/68 - 138/53)  BP(mean): --  RR: 19 (08 Nov 2022 20:48) (16 - 19)  SpO2: 93% (08 Nov 2022 20:48) (93% - 96%)    Parameters below as of 08 Nov 2022 20:48  Patient On (Oxygen Delivery Method): room air      I&O's Detail        Physical Exam:  General: WN/WD NAD  Respiratory: airway patent, respirations unlabored, no increased WoB  Neurology: A&Ox3, nonfocal, TURNER x 4  Abdominal: Soft, NT, ND      LABS:                        13.3   10.65 )-----------( 382      ( 08 Nov 2022 07:23 )             40.1     11-08    143  |  107  |  10  ----------------------------<  112<H>  4.6   |  30  |  0.46<L>    Ca    8.5      08 Nov 2022 07:23  Phos  3.5     11-08  Mg     2.2     11-08    TPro  5.5<L>  /  Alb  2.0<L>  /  TBili  0.3  /  DBili  x   /  AST  12  /  ALT  14  /  AlkPhos  84  11-08    PT/INR - ( 07 Nov 2022 06:45 )   PT: 17.3 sec;   INR: 1.45 ratio         PTT - ( 07 Nov 2022 06:45 )  PTT:30.1 sec      RADIOLOGY & ADDITIONAL STUDIES:

## 2022-11-08 NOTE — PROGRESS NOTE ADULT - SUBJECTIVE AND OBJECTIVE BOX
GI PROGRESS NOTE    Patient is a 77y old  Female who presents with a chief complaint of Abdominal pain, Vomiting (08 Nov 2022 13:47)      HPI:  77F, coming from home, ambulates independently, with no significant PMHx p/w worsening abdominal pain x3d. She states her abdominal pain started on Thursday, after taking new abx, amoxicillin  started on Monday after a recent dental removal. She describes abdominal pain to radiate to her back with sudden nausea and vomiting waking her up on Friday morning. She states that she has not been able to hold any food/ drink down, reporting food aversion since then. Denies any prior episodes of this happening. She states that last year she was told that she was told that she had hardening of the gallbladder with no stones, she does not follow GI o/p. Patient denies fevers, chills, or recent illness. Patient denies HA, chest pain, SOB, abdominal pain, and changes in BM and urination.  (29 Oct 2022 19:23)          ______________________________________________________________________  PMH/PSH:  PAST MEDICAL & SURGICAL HISTORY:  No pertinent past medical history        ______________________________________________________________________  MEDS:  MEDICATIONS  (STANDING):  heparin   Injectable 5000 Unit(s) SubCutaneous every 8 hours  lactated ringers. 1000 milliLiter(s) (200 mL/Hr) IV Continuous <Continuous>  meropenem  IVPB      meropenem  IVPB 1000 milliGRAM(s) IV Intermittent every 8 hours  pantoprazole  Injectable 40 milliGRAM(s) IV Push daily    MEDICATIONS  (PRN):  acetaminophen     Tablet .. 650 milliGRAM(s) Oral every 6 hours PRN Temp greater or equal to 38C (100.4F), Mild Pain (1 - 3)  aluminum hydroxide/magnesium hydroxide/simethicone Suspension 30 milliLiter(s) Oral every 4 hours PRN Dyspepsia  morphine  - Injectable 1 milliGRAM(s) IV Push every 6 hours PRN Moderate Pain (4 - 6)  ondansetron Injectable 4 milliGRAM(s) IV Push every 8 hours PRN Nausea and/or Vomiting  simethicone 80 milliGRAM(s) Chew three times a day PRN Gas    ______________________________________________________________________  ALL:   Allergies    No Known Allergies    Intolerances      ______________________________________________________________________  SH: Social History:    ______________________________________________________________________  FH:  FAMILY HISTORY:    ______________________________________________________________________  ROS:    CONSTITUTIONAL:  No weight loss, fever, chills, weakness or fatigue.    HEENT:  Eyes:  No visual loss, blurred vision, double vision or yellow sclerae. Ears, Nose, Throat:  No hearing loss, sneezing, congestion, runny nose or sore throat.    SKIN:  No rash or itching.    CARDIOVASCULAR:  No chest pain, chest pressure or chest discomfort. No palpitations or edema.    RESPIRATORY:  No shortness of breath, cough or sputum.    GASTROINTESTINAL:  SEE HPI    GENITOURINARY:  No dysuria, hematuria, urinary frequency    NEUROLOGICAL:  No headache, dizziness, syncope, paralysis, ataxia, numbness or tingling in the extremities. No change in bowel or bladder control.    MUSCULOSKELETAL:  No muscle, back pain, joint pain or stiffness.    HEMATOLOGIC:  No anemia, bleeding or bruising.    LYMPHATICS:  No enlarged nodes. No history of splenectomy.    PSYCHIATRIC:  No history of depression or anxiety.    ENDOCRINOLOGIC:  No reports of sweating, cold or heat intolerance. No polyuria or polydipsia.    ALLERGIES:  No history of asthma, hives, eczema or rhinitis.    GI HPI: Patient seen and examined lying in bed in NAD. Reports feeling better. Denies abdominal pain, no N/V/D. + flatulence on BM  ______________________________________________________________________  PHYSICAL EXAM:  T(C): 36.7 (11-08-22 @ 13:38), Max: 36.9 (11-08-22 @ 05:31)  HR: 78 (11-08-22 @ 13:38)  BP: 115/68 (11-08-22 @ 13:38)  RR: 16 (11-08-22 @ 13:38)  SpO2: 95% (11-08-22 @ 13:38)  Wt(kg): --      GEN: NAD   CVS- S1 S2  ABD: soft nontender, non distended, bowel sounds+  LUNGS: clear to auscultation  NEURO: non focal neuro exam;   Extremities: no cyanosis, no calf tenderness, no edema, no clubbing      ______________________________________________________________________  LABS:                        13.3   10.65 )-----------( 382      ( 08 Nov 2022 07:23 )             40.1     11-08    143  |  107  |  10  ----------------------------<  112<H>  4.6   |  30  |  0.46<L>    Ca    8.5      08 Nov 2022 07:23  Phos  3.5     11-08  Mg     2.2     11-08    TPro  5.5<L>  /  Alb  2.0<L>  /  TBili  0.3  /  DBili  x   /  AST  12  /  ALT  14  /  AlkPhos  84  11-08    LIVER FUNCTIONS - ( 08 Nov 2022 07:23 )  Alb: 2.0 g/dL / Pro: 5.5 g/dL / ALK PHOS: 84 U/L / ALT: 14 U/L DA / AST: 12 U/L / GGT: x

## 2022-11-08 NOTE — PROGRESS NOTE ADULT - SUBJECTIVE AND OBJECTIVE BOX
PGY1 Progress Note discussed with attending     PAGER #: [[phone]] TILL 5:00 PM  PLEASE CONTACT ON CALL TEAM:  - On Call Team (Please refer to Elizabeth) FROM 5:00 PM - 8:30PM  - Nightfloat Team FROM 8:30 -7:30 AM    CHIEF COMPLAINT & BRIEF HOSPITAL COURSE:    INTERVAL HPI/OVERNIGHT EVENTS: No acute events noted overnight. Patient is s/p ERCP showing Choledocholithiasis. Patient reports improving abdominal pain. Denies nausea or emesis.        REVIEW OF SYSTEMS:  CONSTITUTIONAL: No fever, weight loss, or fatigue  RESPIRATORY: No cough, wheezing, chills or hemoptysis; No shortness of breath  CARDIOVASCULAR: No chest pain, palpitations, dizziness, or leg swelling  GASTROINTESTINAL: Has  abdominal pain. No nausea, vomiting, or hematemesis; No diarrhea or constipation. No melena or hematochezia.  GENITOURINARY: No dysuria or hematuria, urinary frequency  NEUROLOGICAL: No headaches, memory loss, loss of strength, numbness, or tremors  SKIN: No itching, burning, rashes, or lesions     MEDICATIONS  (STANDING):  lactated ringers. 1000 milliLiter(s) (200 mL/Hr) IV Continuous <Continuous>  meropenem  IVPB      meropenem  IVPB 1000 milliGRAM(s) IV Intermittent every 8 hours  pantoprazole  Injectable 40 milliGRAM(s) IV Push daily    MEDICATIONS  (PRN):  acetaminophen     Tablet .. 650 milliGRAM(s) Oral every 6 hours PRN Temp greater or equal to 38C (100.4F), Mild Pain (1 - 3)  aluminum hydroxide/magnesium hydroxide/simethicone Suspension 30 milliLiter(s) Oral every 4 hours PRN Dyspepsia  morphine  - Injectable 1 milliGRAM(s) IV Push every 6 hours PRN Moderate Pain (4 - 6)  ondansetron Injectable 4 milliGRAM(s) IV Push every 8 hours PRN Nausea and/or Vomiting  simethicone 80 milliGRAM(s) Chew three times a day PRN Gas    Vital Signs Last 24 Hrs  T(C): 36.9 (08 Nov 2022 05:31), Max: 36.9 (08 Nov 2022 05:31)  T(F): 98.4 (08 Nov 2022 05:31), Max: 98.4 (08 Nov 2022 05:31)  HR: 82 (08 Nov 2022 05:31) (68 - 82)  BP: 138/53 (08 Nov 2022 05:31) (104/70 - 165/61)  BP(mean): --  RR: 18 (08 Nov 2022 05:31) (16 - 22)  SpO2: 96% (08 Nov 2022 05:31) (86% - 99%)    Parameters below as of 08 Nov 2022 05:31  Patient On (Oxygen Delivery Method): room air        PHYSICAL EXAMINATION:  GENERAL: NAD, well built  HEAD:  Atraumatic, Normocephalic  EYES:  conjunctiva and sclera clear  NECK: Supple, No JVD, Normal thyroid  CHEST/LUNG: Clear to auscultation. Clear to percussion bilaterally; No rales, rhonchi, wheezing, or rubs  HEART: Regular rate and rhythm; No murmurs, rubs, or gallops  ABDOMEN: Soft, less tender in epigastric region, Nondistended; Bowel sounds present  NERVOUS SYSTEM:  Alert & Oriented X3,    EXTREMITIES:  2+ Peripheral Pulses, No clubbing, cyanosis, or edema  SKIN: warm dry                            13.3   10.65 )-----------( 382      ( 08 Nov 2022 07:23 )             40.1     11-08    143  |  107  |  10  ----------------------------<  112<H>  4.6   |  30  |  0.46<L>    Ca    8.5      08 Nov 2022 07:23  Phos  3.5     11-08  Mg     2.2     11-08    TPro  5.5<L>  /  Alb  2.0<L>  /  TBili  0.3  /  DBili  x   /  AST  12  /  ALT  14  /  AlkPhos  84  11-08    LIVER FUNCTIONS - ( 08 Nov 2022 07:23 )  Alb: 2.0 g/dL / Pro: 5.5 g/dL / ALK PHOS: 84 U/L / ALT: 14 U/L DA / AST: 12 U/L / GGT: x               PT/INR - ( 07 Nov 2022 06:45 )   PT: 17.3 sec;   INR: 1.45 ratio         PTT - ( 07 Nov 2022 06:45 )  PTT:30.1 sec    CAPILLARY BLOOD GLUCOSE      RADIOLOGY & ADDITIONAL TESTS:

## 2022-11-08 NOTE — PROGRESS NOTE ADULT - ATTENDING COMMENTS
discussed management plan with house staff   poss ERCP
discussed management plan with house staff   pt improving clinically   gi f/u
pt seen and examined  GI f/u   POSS ercp ON MONDAY   Discussed management plan with house staff and pt
discussed management plan with house staff  gi f/u
discussed management plan with house staff  improving clinically, GI f/u
discussed management plan with house staff  poss ERCP
discussed management plan with house staff and pt  PATRICK charlton
discussed management plan with house staff   improving symptoms  GI f/u

## 2022-11-08 NOTE — PROGRESS NOTE ADULT - SUBJECTIVE AND OBJECTIVE BOX
[   ] ICU                                          [   ] CCU                                      [  X ] Medical Floor      Patient is a 77 year old female with abdominal pain. GI consulted to evaluate.         77 year old female from home, ambulates independently, with out significant past medical history presented with 3 days history of progressively worsening sharp constant, 8/10 intensity epigastric abdominal pain radiating to her back associated with nausea, non bloody vomiting. Patient reports abdominal pain started after starting taking Amoxacillin for her recent dental removal. Patient denies hematemesis, hematochezia, melena, fever, chills, chest pain, SOB, cough, hematuria, dysuria, diarrhea or taking ETOH.    Patient is comfortable. No new complaints reported, No abdominal pain, N/V, hematemesis, hematochezia, melena, fever, chills, chest pain, SOB, cough or diarrhea reported.           PAIN MANAGEMENT:  Pain Scale:                5/10  Pain Location:  Epigastric abdominal pain      Prior Colonoscopy:  No prior colonoscopy      PAST MEDICAL HISTORY  No significant past medical history reported      PAST SURGICAL HISTORY  No significant surgical history reported      Allergies    No Known Allergies    Intolerances  None       SOCIAL HISTORY  Advanced Directives:       [ X ] Full Code       [  ] DNR  Marital Status:         [  ] M      [ X ] S      [  ] D       [  ] W  Children:       [ X ] Yes      [  ] No  Occupation:        [  ] Employed       [ X] Unemployed       [  ] Retired  Diet:       [ X ] Regular       [  ] PEG feeding          [  ] NG tube feeding  Drug Use:           [ X ] Patient denied          [  ] Yes  Alcohol:           [  X] No             [  ] Yes (socially)         [  ] Yes (chronic)  Tobacco:           [  ] Yes           [  X] No      FAMILY HISTORY  [ X ] Heart Disease            [ X ] Diabetes             [ X ] HTN             [  ] Colon Cancer             [  ] Stomach Cancer              [  ] Pancreatic Cancer        VITALS  Vital Signs Last 24 Hrs  T(C): 36.7 (08 Nov 2022 13:38), Max: 36.9 (08 Nov 2022 05:31)  T(F): 98 (08 Nov 2022 13:38), Max: 98.4 (08 Nov 2022 05:31)  HR: 78 (08 Nov 2022 13:38) (78 - 82)  BP: 115/68 (08 Nov 2022 13:38) (115/68 - 138/53)   RR: 16 (08 Nov 2022 13:38) (16 - 18)  SpO2: 95% (08 Nov 2022 13:38) (92% - 96%)  Parameters below as of 08 Nov 2022 13:38  Patient On (Oxygen Delivery Method): room air       MEDICATIONS  (STANDING):  heparin   Injectable 5000 Unit(s) SubCutaneous every 8 hours  lactated ringers. 1000 milliLiter(s) (200 mL/Hr) IV Continuous <Continuous>  meropenem  IVPB      meropenem  IVPB 1000 milliGRAM(s) IV Intermittent every 8 hours  pantoprazole  Injectable 40 milliGRAM(s) IV Push daily    MEDICATIONS  (PRN):  acetaminophen     Tablet .. 650 milliGRAM(s) Oral every 6 hours PRN Temp greater or equal to 38C (100.4F), Mild Pain (1 - 3)  aluminum hydroxide/magnesium hydroxide/simethicone Suspension 30 milliLiter(s) Oral every 4 hours PRN Dyspepsia  morphine  - Injectable 1 milliGRAM(s) IV Push every 6 hours PRN Moderate Pain (4 - 6)  ondansetron Injectable 4 milliGRAM(s) IV Push every 8 hours PRN Nausea and/or Vomiting  simethicone 80 milliGRAM(s) Chew three times a day PRN Gas                            13.3   10.65 )-----------( 382      ( 08 Nov 2022 07:23 )             40.1       11-08    143  |  107  |  10  ----------------------------<  112<H>  4.6   |  30  |  0.46<L>    Ca    8.5      08 Nov 2022 07:23  Phos  3.5     11-08  Mg     2.2     11-08    TPro  5.5<L>  /  Alb  2.0<L>  /  TBili  0.3  /  DBili  x   /  AST  12  /  ALT  14  /  AlkPhos  84  11-08      PT/INR - ( 07 Nov 2022 06:45 )   PT: 17.3 sec;   INR: 1.45 ratio         PTT - ( 07 Nov 2022 06:45 )  PTT:30.1 sec

## 2022-11-08 NOTE — PROGRESS NOTE ADULT - SUBJECTIVE AND OBJECTIVE BOX
Patient is seen and examined at the bed side, is afebrile. She is scheduled for ERCP today. The Leukocytosis trended down to normal.       REVIEW OF SYSTEMS: All other review systems are negative      ALLERGIES: No Known Allergies      Vital Signs Last 24 Hrs  T(C): 36.7 (2022 13:38), Max: 36.9 (2022 05:31)  T(F): 98 (:38), Max: 98.4 (2022 05:31)  HR: 78 (:38) (78 - 82)  BP: 115/68 (:38) (115/68 - 138/53)  BP(mean): --  RR: 16 (:38) (16 - 18)  SpO2: 95% (:38) (92% - 96%)    Parameters below as of 2022 13:38  Patient On (Oxygen Delivery Method): room air        PHYSICAL EXAM:  GENERAL: Not in distress   CHEST/LUNG: Not using accessory muscles   HEART: s1 and s2 present  ABDOMEN:  epigastric and left sided tenderness improving   EXTREMITIES: No pedal  edema  CNS: Awake and Alert      LABS:                         13.3   10.65 )-----------( 382      ( 2022 07:23 )             40.1                13.6   13.26 )-----------( 309      ( 2022 07:07 )             40.2             143  |  107  |  10  ----------------------------<  112<H>  4.6   |  30  |  0.46<L>    Ca    8.5      2022 07:23  Phos  3.5       Mg     2.2         TPro  5.5<L>  /  Alb  2.0<L>  /  TBili  0.3  /  DBili  x   /  AST  12  /  ALT  14  /  AlkPhos  84  07    142  |  106  |  5<L>  ----------------------------<  103<H>  3.8   |  29  |  0.45<L>    Ca    8.6      2022 06:45  Phos  2.5       Mg     2.0         TPro  5.2<L>  /  Alb  1.7<L>  /  TBili  0.3  /  DBili  x   /  AST  13  /  ALT  16  /  AlkPhos  85           Urinalysis Basic - ( 29 Oct 2022 14:40 )  Color: Raina / Appearance: Clear / S.025 / pH: x  Gluc: x / Ketone: Small  / Bili: Negative / Urobili: Negative   Blood: x / Protein: 100 / Nitrite: Negative   Leuk Esterase: Trace / RBC: 5-10 /HPF / WBC 0-2 /HPF   Sq Epi: x / Non Sq Epi: Negative /HPF / Bacteria: Negative /HPF        MEDICATIONS  (STANDING):    lactated ringers. 1000 milliLiter(s) (200 mL/Hr) IV Continuous <Continuous>  meropenem  IVPB      meropenem  IVPB 1000 milliGRAM(s) IV Intermittent every 8 hours  pantoprazole  Injectable 40 milliGRAM(s) IV Push daily      RADIOLOGY & ADDITIONAL TESTS:    22: MR MRCP w/wo IV Cont (22 @ 13:10) The common bile duct measures up to 6 mm in caliber which is within   normal limits. 5 mm distal common bile duct stone. Cholelithiasis.    Peripancreatic edema, compatible with acute pancreatitis. 5.9 x 2.4 cm  area of walled-off necrosis at the pancreatic body/tail.    Small ascites.  Mild bilateral pleural effusions.      22: US Abdomen Upper Quadrant Right (22 @ 11:28) Cholelithiasis without evidence of thickened gallbladder wall,   pericholecystic fluid or ultrasonic Gonzalez's sign.    Mild peripancreatic fluid, consistent with patient's known acute pancreatitis.      10/29/22: CT Abdomen and Pelvis w/ IV Cont (10.29.22 @ 13:37) >Acute pancreatitis with focal area of decreased enhancement in the   proximal anterior pancreatic body.        MICROBIOLOGY DATA:    Culture - Urine (10.29.22 @ 14:40)   - Amikacin: S <=16   - Amoxicillin/Clavulanic Acid: S <=8/4   - Ampicillin: R >16 These ampicillin results predict results for amoxicillin   - Ampicillin/Sulbactam: S / Enterobacter, Klebsiella aerogenes, Citrobacter, and Serratia may develop resistance during prolonged therapy (3-4 days)   - Aztreonam: S <=4   - Cefazolin: S <=2 For uncomplicated UTI with K. pneumoniae, E. coli, or P. mirablis: JACINTO <=16 is sensitive and JACINTO >=32 is resistant. This also predicts results for oral agents cefaclor, cefdinir, cefpodoxime, cefprozil, cefuroxime axetil, cephalexin and locarbef for uncomplicated UTI. Note that some isolates may be susceptible to these agents while testing resistant to cefazolin.   - Cefepime: S <=2   - Cefoxitin: S <=8   - Ceftriaxone: S <=1 Enterobacter, Klebsiella aerogenes, Citrobacter, and Serratia may develop resistance during prolonged therapy   - Ciprofloxacin: S <=0.25   - Ertapenem: S <=0.5   - Gentamicin: S <=2   - Imipenem: S <=1   - Levofloxacin: S <=0.5   - Meropenem: S <=1   - Nitrofurantoin: I 64 Should not be used to treat pyelonephritis   - Piperacillin/Tazobactam: S <=8   - Tigecycline: S <=2   - Tobramycin: S <=2   - Trimethoprim/Sulfamethoxazole: S <=0.5/9.5   Specimen Source: Clean Catch Clean Catch (Midstream)   Culture Results:   10,000 - 49,000 CFU/mL Klebsiella pneumoniae   <10,000 CFU/ml Normal Urogenital vito present   Organism Identification: Klebsiella pneumoniae   Organism: Klebsiella pneumoniae   COVID-19 PCR . (10.29.22 @ 14:40)   COVID-19 PCR: NotDetec:

## 2022-11-08 NOTE — PROGRESS NOTE ADULT - PROBLEM SELECTOR PLAN 1
p/w abdominal pain + lipase elevated 5682   likely in setting of new Abx use s/p dental procedure on Monday   CT a/p shows distended GB with evidence of acute pancreatitis, colonic diverticulosis, mild-moderate pelvic ascites, and no evidence of cholelithiasis  gi f/u  pain control  iv fluids
s/p ERCP yesterday with Complete removal with biliary sphincterotomy and balloon extraction  Avoid NSAIDs (Ibuprofen, Motrin, Aleve, Naproxen, etc) for 2 weeks  Pantoprazole 40mg once daily for 2 weeks
p/w abdominal pain + lipase elevated 5682   2/2 gallstone pancreatitis   CT a/p shows distended GB with evidence of acute pancreatitis, colonic diverticulosis, mild-moderate pelvic ascites, and no evidence of cholelithiasis  pain control  RUQ US noted showing Cholelithiasis without evidence of thickened gallbladder wall, pericholecystic fluid or ultrasonic Gonzalez's sign.  continue with Full liquid diet  MRCP showing The common bile duct measures up to 6 mm in caliber which is within normal limits. 5 mm distal common bile duct stone. Cholelithiasis. Peripancreatic edema, compatible with acute pancreatitis. 5.9 x 2.4 cm   area of walled-off necrosis at the pancreatic body/tail.  GI consulted Dr. Victoria  Surgery consulted recommending ERCP  ERCP on 11/7/22  NPO after midnight on 11/7/22  Coags and Type and screen
p/w abdominal pain + lipase elevated 5682   likely in setting of new Abx use s/p dental procedure on Monday   CT a/p shows distended GB with evidence of acute pancreatitis, colonic diverticulosis, mild-moderate pelvic ascites, and no evidence of cholelithiasis  f/u RUQ US   gi f/u  pain control  iv fluids
p/w abdominal pain + lipase elevated 5682   2/2 gallstone pancreatitis   CT a/p shows distended GB with evidence of acute pancreatitis, colonic diverticulosis, mild-moderate pelvic ascites, and no evidence of cholelithiasis  pain control  RUQ US noted showing Cholelithiasis without evidence of thickened gallbladder wall, pericholecystic fluid or ultrasonic Gonzalez's sign.  continue with Full liquid diet  MRCP showing The common bile duct measures up to 6 mm in caliber which is within normal limits. 5 mm distal common bile duct stone. Cholelithiasis. Peripancreatic edema, compatible with acute pancreatitis. 5.9 x 2.4 cm   area of walled-off necrosis at the pancreatic body/tail.  GI consulted Dr. Victoria  ERCP showing choledocholithiasis with stone removal  will need ID f/u for antibiotic duration
p/w abdominal pain + lipase elevated 5682   2/2 gallstone pancreatitis   CT a/p shows distended GB with evidence of acute pancreatitis, colonic diverticulosis, mild-moderate pelvic ascites, and no evidence of cholelithiasis  pain control  RUQ US noted showing Cholelithiasis without evidence of thickened gallbladder wall, pericholecystic fluid or ultrasonic Gnozalez's sign.  continue with Full liquid diet  MRCP showing The common bile duct measures up to 6 mm in caliber which is within normal limits. 5 mm distal common bile duct stone. Cholelithiasis. Peripancreatic edema, compatible with acute pancreatitis. 5.9 x 2.4 cm   area of walled-off necrosis at the pancreatic body/tail.  GI consulted Dr. Victoria  Surgery consulted
p/w abdominal pain + lipase elevated 5682   2/2 gallstone pancreatitis   CT a/p shows distended GB with evidence of acute pancreatitis, colonic diverticulosis, mild-moderate pelvic ascites, and no evidence of cholelithiasis  pain control  RUQ US noted showing Cholelithiasis without evidence of thickened gallbladder wall, pericholecystic fluid or ultrasonic Gonzalez's sign.  continue with Full liquid diet  MRCP showing The common bile duct measures up to 6 mm in caliber which is within normal limits. 5 mm distal common bile duct stone. Cholelithiasis. Peripancreatic edema, compatible with acute pancreatitis. 5.9 x 2.4 cm   area of walled-off necrosis at the pancreatic body/tail.  GI consulted Dr. Victoria  Surgery consulted recommending ERCP  ERCP on 11/7/22
p/w abdominal pain + lipase elevated 5682   likely in setting of new Abx use s/p dental procedure on Monday   CT a/p shows distended GB with evidence of acute pancreatitis, colonic diverticulosis, mild-moderate pelvic ascites, and no evidence of cholelithiasis  pain control  RUQ US noted showing Cholelithiasis without evidence of thickened gallbladder wall, pericholecystic fluid or ultrasonic Gonzalez's sign.  transition to full liquid diet as tolerated   f/u MRCP read   GI consulted Dr. Victoria
p/w abdominal pain + lipase elevated 5682   2/2 gallstone pancreatitis   CT a/p shows distended GB with evidence of acute pancreatitis, colonic diverticulosis, mild-moderate pelvic ascites, and no evidence of cholelithiasis  pain control  RUQ US noted showing Cholelithiasis without evidence of thickened gallbladder wall, pericholecystic fluid or ultrasonic Gonzalez's sign.  continue with Full liquid diet  MRCP showing The common bile duct measures up to 6 mm in caliber which is within normal limits. 5 mm distal common bile duct stone. Cholelithiasis. Peripancreatic edema, compatible with acute pancreatitis. 5.9 x 2.4 cm   area of walled-off necrosis at the pancreatic body/tail.  GI consulted Dr. Victoria  Surgery consulted recommending ERCP  ERCP on 11/7/22  NPO after midnight on 11/7/22  Coags and Type and screen
p/w abdominal pain + lipase elevated 5682   2/2 gallstone pancreatitis   CT a/p shows distended GB with evidence of acute pancreatitis, colonic diverticulosis, mild-moderate pelvic ascites, and no evidence of cholelithiasis  pain control  RUQ US noted showing Cholelithiasis without evidence of thickened gallbladder wall, pericholecystic fluid or ultrasonic Gonzalez's sign.  continue with Full liquid diet  MRCP showing The common bile duct measures up to 6 mm in caliber which is within normal limits. 5 mm distal common bile duct stone. Cholelithiasis. Peripancreatic edema, compatible with acute pancreatitis. 5.9 x 2.4 cm   area of walled-off necrosis at the pancreatic body/tail.  GI consulted Dr. Victoria  Surgery consulted recommending ERCP  ERCP on 11/7/22  NPO after midnight on 11/7/22  Coags and Type and screen
p/w abdominal pain + lipase elevated 5682   likely in setting of new Abx use s/p dental procedure on Monday   CT a/p shows distended GB with evidence of acute pancreatitis, colonic diverticulosis, mild-moderate pelvic ascites, and no evidence of cholelithiasis  pain control  RUQ US noted showing Cholelithiasis without evidence of thickened gallbladder wall, pericholecystic fluid or ultrasonic Gonzalez's sign.  will start patient on clear liquid diet  GI f/u

## 2022-11-08 NOTE — PROGRESS NOTE ADULT - ASSESSMENT
Patient is a 77y old  Female from home, ambulates independently, with no significant PMHx, presents to the ER for evaluation of worsening abdominal pain x 3d. She states her abdominal pain started on Thursday, after taking new abx, amoxicillin  started on Monday after a recent dental removal. She describes abdominal pain to radiate to her back with sudden nausea and vomiting waking her up on Friday morning. She states that she has not been able to hold any food/ drink down. ON Admission, she found to have no fever but Leukocytosis. The CT abd/pelvis shows Acute Pancreatitis. The ID consult requested to assist with further evaluation and antibiotic management.     # Acute Pancreatitis- most likely Gallstone pancreatitis- in the setting of Cholelithiasis seen on abdominal US from 11/1/22- MRCP as of 11/2 shows Peripancreatic edema, compatible with acute pancreatitis. 5.9 x 2.4 cm  area of walled-off necrosis at the pancreatic body/tail. Cholelithiasis  and  5 mm distal common bile duct stone.   # Leukocytosis - resolving   # Positive Urine culture- most likely a contaminant in the setting of Negative Urine analysis    would recommend:    1. May change Abx to oral Levaquin 750 mg daily IF QTC is less than 450,  and Flagyl 500 mg q 8 hours to continue until 11/14/22  2. Continue Meropenem while inpatient   3. Cholecystectomy as outpatient   4. OOB to chair    d/w patient and Nursing staff     Attending Attestation:    Spent more than 35 minutes on total encounter, more than 50 % of the visit was spent counseling and/or coordinating care by the Attending physician.

## 2022-11-09 ENCOUNTER — TRANSCRIPTION ENCOUNTER (OUTPATIENT)
Age: 77
End: 2022-11-09

## 2022-11-09 VITALS
HEART RATE: 74 BPM | OXYGEN SATURATION: 95 % | SYSTOLIC BLOOD PRESSURE: 116 MMHG | RESPIRATION RATE: 16 BRPM | TEMPERATURE: 98 F | DIASTOLIC BLOOD PRESSURE: 66 MMHG

## 2022-11-09 LAB
ALBUMIN SERPL ELPH-MCNC: 2.1 G/DL — LOW (ref 3.5–5)
ALP SERPL-CCNC: 80 U/L — SIGNIFICANT CHANGE UP (ref 40–120)
ALT FLD-CCNC: 17 U/L DA — SIGNIFICANT CHANGE UP (ref 10–60)
ANION GAP SERPL CALC-SCNC: 3 MMOL/L — LOW (ref 5–17)
AST SERPL-CCNC: 13 U/L — SIGNIFICANT CHANGE UP (ref 10–40)
BILIRUB SERPL-MCNC: 0.3 MG/DL — SIGNIFICANT CHANGE UP (ref 0.2–1.2)
BUN SERPL-MCNC: 18 MG/DL — SIGNIFICANT CHANGE UP (ref 7–18)
CALCIUM SERPL-MCNC: 8.6 MG/DL — SIGNIFICANT CHANGE UP (ref 8.4–10.5)
CHLORIDE SERPL-SCNC: 107 MMOL/L — SIGNIFICANT CHANGE UP (ref 96–108)
CO2 SERPL-SCNC: 32 MMOL/L — HIGH (ref 22–31)
CREAT SERPL-MCNC: 0.68 MG/DL — SIGNIFICANT CHANGE UP (ref 0.5–1.3)
EGFR: 89 ML/MIN/1.73M2 — SIGNIFICANT CHANGE UP
GLUCOSE SERPL-MCNC: 102 MG/DL — HIGH (ref 70–99)
HCT VFR BLD CALC: 39.1 % — SIGNIFICANT CHANGE UP (ref 34.5–45)
HGB BLD-MCNC: 12.6 G/DL — SIGNIFICANT CHANGE UP (ref 11.5–15.5)
MAGNESIUM SERPL-MCNC: 2.2 MG/DL — SIGNIFICANT CHANGE UP (ref 1.6–2.6)
MCHC RBC-ENTMCNC: 28.7 PG — SIGNIFICANT CHANGE UP (ref 27–34)
MCHC RBC-ENTMCNC: 32.2 GM/DL — SIGNIFICANT CHANGE UP (ref 32–36)
MCV RBC AUTO: 89.1 FL — SIGNIFICANT CHANGE UP (ref 80–100)
NRBC # BLD: 0 /100 WBCS — SIGNIFICANT CHANGE UP (ref 0–0)
PHOSPHATE SERPL-MCNC: 2.9 MG/DL — SIGNIFICANT CHANGE UP (ref 2.5–4.5)
PLATELET # BLD AUTO: 427 K/UL — HIGH (ref 150–400)
POTASSIUM SERPL-MCNC: 4 MMOL/L — SIGNIFICANT CHANGE UP (ref 3.5–5.3)
POTASSIUM SERPL-SCNC: 4 MMOL/L — SIGNIFICANT CHANGE UP (ref 3.5–5.3)
PROT SERPL-MCNC: 5.6 G/DL — LOW (ref 6–8.3)
RBC # BLD: 4.39 M/UL — SIGNIFICANT CHANGE UP (ref 3.8–5.2)
RBC # FLD: 13 % — SIGNIFICANT CHANGE UP (ref 10.3–14.5)
SODIUM SERPL-SCNC: 142 MMOL/L — SIGNIFICANT CHANGE UP (ref 135–145)
WBC # BLD: 8.14 K/UL — SIGNIFICANT CHANGE UP (ref 3.8–10.5)
WBC # FLD AUTO: 8.14 K/UL — SIGNIFICANT CHANGE UP (ref 3.8–10.5)

## 2022-11-09 PROCEDURE — 82962 GLUCOSE BLOOD TEST: CPT

## 2022-11-09 PROCEDURE — 74177 CT ABD & PELVIS W/CONTRAST: CPT | Mod: MA

## 2022-11-09 PROCEDURE — 86301 IMMUNOASSAY TUMOR CA 19-9: CPT

## 2022-11-09 PROCEDURE — 85610 PROTHROMBIN TIME: CPT

## 2022-11-09 PROCEDURE — 85025 COMPLETE CBC W/AUTO DIFF WBC: CPT

## 2022-11-09 PROCEDURE — 82977 ASSAY OF GGT: CPT

## 2022-11-09 PROCEDURE — 81001 URINALYSIS AUTO W/SCOPE: CPT

## 2022-11-09 PROCEDURE — 96375 TX/PRO/DX INJ NEW DRUG ADDON: CPT

## 2022-11-09 PROCEDURE — 87186 SC STD MICRODIL/AGAR DIL: CPT

## 2022-11-09 PROCEDURE — 96374 THER/PROPH/DIAG INJ IV PUSH: CPT

## 2022-11-09 PROCEDURE — 83690 ASSAY OF LIPASE: CPT

## 2022-11-09 PROCEDURE — 83735 ASSAY OF MAGNESIUM: CPT

## 2022-11-09 PROCEDURE — 86900 BLOOD TYPING SEROLOGIC ABO: CPT

## 2022-11-09 PROCEDURE — 86901 BLOOD TYPING SEROLOGIC RH(D): CPT

## 2022-11-09 PROCEDURE — 82378 CARCINOEMBRYONIC ANTIGEN: CPT

## 2022-11-09 PROCEDURE — 76000 FLUOROSCOPY <1 HR PHYS/QHP: CPT

## 2022-11-09 PROCEDURE — 99285 EMERGENCY DEPT VISIT HI MDM: CPT

## 2022-11-09 PROCEDURE — 87635 SARS-COV-2 COVID-19 AMP PRB: CPT

## 2022-11-09 PROCEDURE — A9585: CPT

## 2022-11-09 PROCEDURE — 80053 COMPREHEN METABOLIC PANEL: CPT

## 2022-11-09 PROCEDURE — 86850 RBC ANTIBODY SCREEN: CPT

## 2022-11-09 PROCEDURE — 87086 URINE CULTURE/COLONY COUNT: CPT

## 2022-11-09 PROCEDURE — 85730 THROMBOPLASTIN TIME PARTIAL: CPT

## 2022-11-09 PROCEDURE — 36415 COLL VENOUS BLD VENIPUNCTURE: CPT

## 2022-11-09 PROCEDURE — 93005 ELECTROCARDIOGRAM TRACING: CPT

## 2022-11-09 PROCEDURE — 87077 CULTURE AEROBIC IDENTIFY: CPT

## 2022-11-09 PROCEDURE — 83605 ASSAY OF LACTIC ACID: CPT

## 2022-11-09 PROCEDURE — 84478 ASSAY OF TRIGLYCERIDES: CPT

## 2022-11-09 PROCEDURE — 84100 ASSAY OF PHOSPHORUS: CPT

## 2022-11-09 PROCEDURE — 85027 COMPLETE CBC AUTOMATED: CPT

## 2022-11-09 PROCEDURE — 76705 ECHO EXAM OF ABDOMEN: CPT

## 2022-11-09 PROCEDURE — C1769: CPT

## 2022-11-09 PROCEDURE — C1889: CPT

## 2022-11-09 PROCEDURE — 74183 MRI ABD W/O CNTR FLWD CNTR: CPT

## 2022-11-09 RX ORDER — CIPROFLOXACIN LACTATE 400MG/40ML
1 VIAL (ML) INTRAVENOUS
Qty: 5 | Refills: 0
Start: 2022-11-09 | End: 2022-11-13

## 2022-11-09 RX ORDER — METRONIDAZOLE 500 MG
1 TABLET ORAL
Qty: 15 | Refills: 0
Start: 2022-11-09 | End: 2022-11-13

## 2022-11-09 RX ADMIN — HEPARIN SODIUM 5000 UNIT(S): 5000 INJECTION INTRAVENOUS; SUBCUTANEOUS at 05:07

## 2022-11-09 RX ADMIN — PANTOPRAZOLE SODIUM 40 MILLIGRAM(S): 20 TABLET, DELAYED RELEASE ORAL at 13:19

## 2022-11-09 RX ADMIN — Medication 650 MILLIGRAM(S): at 05:02

## 2022-11-09 RX ADMIN — MEROPENEM 100 MILLIGRAM(S): 1 INJECTION INTRAVENOUS at 14:23

## 2022-11-09 RX ADMIN — Medication 650 MILLIGRAM(S): at 05:32

## 2022-11-09 RX ADMIN — MEROPENEM 100 MILLIGRAM(S): 1 INJECTION INTRAVENOUS at 05:02

## 2022-11-09 RX ADMIN — HEPARIN SODIUM 5000 UNIT(S): 5000 INJECTION INTRAVENOUS; SUBCUTANEOUS at 13:18

## 2022-11-09 NOTE — PROGRESS NOTE ADULT - REASON FOR ADMISSION
Abdominal pain, Vomiting
Acute Pancreatitis
Acute gallstone pancreatitis
Abdominal pain, Vomiting
Abdominal pain, Vomiting
Acute pancreatitis
Abdominal pain, Vomiting
Acute gallstone pancreatitis
Abdominal pain, Vomiting
Abdominal pain, Vomiting
Acute pancreatitis
Abdominal pain, Vomiting
Gallstone pancreatitis
Gallstone pancreatitis
Acute Pancreatitis
Abdominal pain, Vomiting

## 2022-11-09 NOTE — PROGRESS NOTE ADULT - SUBJECTIVE AND OBJECTIVE BOX
Patient is seen and examined at the bed side, is afebrile. She is awaiting to be discharged. The WBC count and kidney function is normal.       REVIEW OF SYSTEMS: All other review systems are negative      ALLERGIES: No Known Allergies      Vital Signs Last 24 Hrs  T(C): 36.6 (2022 13:10), Max: 36.6 (2022 13:10)  T(F): 97.8 (2022 13:10), Max: 97.8 (2022 13:10)  HR: 74 (2022 13:10) (67 - 78)  BP: 116/66 (2022 13:10) (116/66 - 143/52)  BP(mean): --  RR: 16 (2022 13:10) (16 - 19)  SpO2: 95% (2022 13:10) (93% - 97%)    Parameters below as of 2022 13:10  Patient On (Oxygen Delivery Method): room air        PHYSICAL EXAM:  GENERAL: Not in distress   CHEST/LUNG: Not using accessory muscles   HEART: s1 and s2 present  ABDOMEN:  epigastric and left sided tenderness improving   EXTREMITIES: No pedal  edema  CNS: Awake and Alert      LABS:                         12.6   8.14  )-----------( 427      ( 2022 07:55 )             39.1               13.6   13.26 )-----------( 309      ( 2022 07:07 )             40.2             142  |  107  |  18  ----------------------------<  102<H>  4.0   |  32<H>  |  0.68    Ca    8.6      2022 07:55  Phos  2.9       Mg     2.2         TPro  5.6<L>  /  Alb  2.1<L>  /  TBili  0.3  /  DBili  x   /  AST  13  /  ALT  17  /  AlkPhos  80      143  |  107  |  10  ----------------------------<  112<H>  4.6   |  30  |  0.46<L>    Ca    8.5      2022 07:23  Phos  3.5       Mg     2.2         TPro  5.5<L>  /  Alb  2.0<L>  /  TBili  0.3  /  DBili  x   /  AST  12  /  ALT  14  /  AlkPhos  84          Urinalysis Basic - ( 29 Oct 2022 14:40 )  Color: Raina / Appearance: Clear / S.025 / pH: x  Gluc: x / Ketone: Small  / Bili: Negative / Urobili: Negative   Blood: x / Protein: 100 / Nitrite: Negative   Leuk Esterase: Trace / RBC: 5-10 /HPF / WBC 0-2 /HPF   Sq Epi: x / Non Sq Epi: Negative /HPF / Bacteria: Negative /HPF        MEDICATIONS  (STANDING):    heparin   Injectable 5000 Unit(s) SubCutaneous every 8 hours  lactated ringers. 1000 milliLiter(s) (200 mL/Hr) IV Continuous <Continuous>  meropenem  IVPB      meropenem  IVPB 1000 milliGRAM(s) IV Intermittent every 8 hours  pantoprazole  Injectable 40 milliGRAM(s) IV Push daily      RADIOLOGY & ADDITIONAL TESTS:    22: MR MRCP w/wo IV Cont (22 @ 13:10) The common bile duct measures up to 6 mm in caliber which is within   normal limits. 5 mm distal common bile duct stone. Cholelithiasis.    Peripancreatic edema, compatible with acute pancreatitis. 5.9 x 2.4 cm  area of walled-off necrosis at the pancreatic body/tail.    Small ascites.  Mild bilateral pleural effusions.      22: US Abdomen Upper Quadrant Right (22 @ 11:28) Cholelithiasis without evidence of thickened gallbladder wall,   pericholecystic fluid or ultrasonic Gonzalez's sign.    Mild peripancreatic fluid, consistent with patient's known acute pancreatitis.      10/29/22: CT Abdomen and Pelvis w/ IV Cont (10.29.22 @ 13:37) >Acute pancreatitis with focal area of decreased enhancement in the   proximal anterior pancreatic body.        MICROBIOLOGY DATA:    Culture - Urine (10.29.22 @ 14:40)   - Amikacin: S <=16   - Amoxicillin/Clavulanic Acid: S <=8/4   - Ampicillin: R >16 These ampicillin results predict results for amoxicillin   - Ampicillin/Sulbactam: S 8/4 Enterobacter, Klebsiella aerogenes, Citrobacter, and Serratia may develop resistance during prolonged therapy (3-4 days)   - Aztreonam: S <=4   - Cefazolin: S <=2 For uncomplicated UTI with K. pneumoniae, E. coli, or P. mirablis: JACINTO <=16 is sensitive and JACINTO >=32 is resistant. This also predicts results for oral agents cefaclor, cefdinir, cefpodoxime, cefprozil, cefuroxime axetil, cephalexin and locarbef for uncomplicated UTI. Note that some isolates may be susceptible to these agents while testing resistant to cefazolin.   - Cefepime: S <=2   - Cefoxitin: S <=8   - Ceftriaxone: S <=1 Enterobacter, Klebsiella aerogenes, Citrobacter, and Serratia may develop resistance during prolonged therapy   - Ciprofloxacin: S <=0.25   - Ertapenem: S <=0.5   - Gentamicin: S <=2   - Imipenem: S <=1   - Levofloxacin: S <=0.5   - Meropenem: S <=1   - Nitrofurantoin: I 64 Should not be used to treat pyelonephritis   - Piperacillin/Tazobactam: S <=8   - Tigecycline: S <=2   - Tobramycin: S <=2   - Trimethoprim/Sulfamethoxazole: S <=0.5/9.5   Specimen Source: Clean Catch Clean Catch (Midstream)   Culture Results:   10,000 - 49,000 CFU/mL Klebsiella pneumoniae   <10,000 CFU/ml Normal Urogenital vito present   Organism Identification: Klebsiella pneumoniae   Organism: Klebsiella pneumoniae   COVID-19 PCR . (10.29.22 @ 14:40)   COVID-19 PCR: NotDetec:

## 2022-11-09 NOTE — PROGRESS NOTE ADULT - NEGATIVE ENMT SYMPTOMS
no hearing difficulty/no ear pain/no tinnitus/no vertigo/no sinus symptoms/no nasal congestion/no nasal discharge/no nasal obstruction/no post-nasal discharge/no nose bleeds/no gum bleeding/no dry mouth/no throat pain/no dysphagia

## 2022-11-09 NOTE — PROGRESS NOTE ADULT - NEGATIVE OPHTHALMOLOGIC SYMPTOMS
no diplopia/no photophobia/no lacrimation L/no lacrimation R/no blurred vision L/no blurred vision R/no discharge L/no discharge R/no pain L/no pain R/no irritation L/no irritation R/no loss of vision L/no loss of vision R/no scleral injection L/no scleral injection R

## 2022-11-09 NOTE — PROGRESS NOTE ADULT - TONSILS
no redness/no swelling
no redness/no discharge
no redness/no swelling

## 2022-11-09 NOTE — PROGRESS NOTE ADULT - NEGATIVE RESPIRATORY AND THORAX SYMPTOMS
no wheezing/no dyspnea/no cough/no hemoptysis/no pleuritic chest pain

## 2022-11-09 NOTE — PROGRESS NOTE ADULT - SKIN
warm and dry/no rashes/no ulcers

## 2022-11-09 NOTE — PROGRESS NOTE ADULT - NEGATIVE GASTROINTESTINAL SYMPTOMS
no diarrhea/no constipation/no change in bowel habits/no melena/no hematochezia/no steatorrhea/no jaundice/no hiccoughs

## 2022-11-09 NOTE — PROGRESS NOTE ADULT - PROVIDER SPECIALTY LIST ADULT
Gastroenterology
Infectious Disease
Infectious Disease
Internal Medicine
Infectious Disease
Internal Medicine
Surgery
Internal Medicine
Surgery
Gastroenterology
Surgery
Internal Medicine
Internal Medicine
Gastroenterology
Internal Medicine
Gastroenterology
Internal Medicine
Internal Medicine
Gastroenterology
Internal Medicine
Internal Medicine
Gastroenterology

## 2022-11-09 NOTE — PROGRESS NOTE ADULT - SUBJECTIVE AND OBJECTIVE BOX
[   ] ICU                                          [   ] CCU                                      [  X ] Medical Floor      Patient is a 77 year old female with abdominal pain. GI consulted to evaluate.         77 year old female from home, ambulates independently, with out significant past medical history presented with 3 days history of progressively worsening sharp constant, 8/10 intensity epigastric abdominal pain radiating to her back associated with nausea, non bloody vomiting. Patient reports abdominal pain started after starting taking Amoxacillin for her recent dental removal. Patient denies hematemesis, hematochezia, melena, fever, chills, chest pain, SOB, cough, hematuria, dysuria, diarrhea or taking ETOH.    Patient is comfortable. No new complaints reported, No abdominal pain, N/V, hematemesis, hematochezia, melena, fever, chills, chest pain, SOB, cough or diarrhea reported.           PAIN MANAGEMENT:  Pain Scale:                5/10  Pain Location:  Epigastric abdominal pain      Prior Colonoscopy:  No prior colonoscopy      PAST MEDICAL HISTORY  No significant past medical history reported      PAST SURGICAL HISTORY  No significant surgical history reported      Allergies    No Known Allergies    Intolerances  None       SOCIAL HISTORY  Advanced Directives:       [ X ] Full Code       [  ] DNR  Marital Status:         [  ] M      [ X ] S      [  ] D       [  ] W  Children:       [ X ] Yes      [  ] No  Occupation:        [  ] Employed       [ X] Unemployed       [  ] Retired  Diet:       [ X ] Regular       [  ] PEG feeding          [  ] NG tube feeding  Drug Use:           [ X ] Patient denied          [  ] Yes  Alcohol:           [  X] No             [  ] Yes (socially)         [  ] Yes (chronic)  Tobacco:           [  ] Yes           [  X] No      FAMILY HISTORY  [ X ] Heart Disease            [ X ] Diabetes             [ X ] HTN             [  ] Colon Cancer             [  ] Stomach Cancer              [  ] Pancreatic Cancer      VITALS  Vital Signs Last 24 Hrs  T(C): 36.4 (09 Nov 2022 05:18), Max: 36.7 (08 Nov 2022 13:38)  T(F): 97.6 (09 Nov 2022 05:18), Max: 98 (08 Nov 2022 13:38)  HR: 67 (09 Nov 2022 05:18) (67 - 78)  BP: 143/52 (09 Nov 2022 05:18) (115/68 - 143/52)   RR: 16 (09 Nov 2022 05:18) (16 - 19)  SpO2: 97% (09 Nov 2022 05:18) (93% - 97%)  Parameters below as of 09 Nov 2022 05:18  Patient On (Oxygen Delivery Method): room air       MEDICATIONS  (STANDING):  heparin   Injectable 5000 Unit(s) SubCutaneous every 8 hours  lactated ringers. 1000 milliLiter(s) (200 mL/Hr) IV Continuous <Continuous>  meropenem  IVPB      meropenem  IVPB 1000 milliGRAM(s) IV Intermittent every 8 hours  pantoprazole  Injectable 40 milliGRAM(s) IV Push daily    MEDICATIONS  (PRN):  acetaminophen     Tablet .. 650 milliGRAM(s) Oral every 6 hours PRN Temp greater or equal to 38C (100.4F), Mild Pain (1 - 3)  aluminum hydroxide/magnesium hydroxide/simethicone Suspension 30 milliLiter(s) Oral every 4 hours PRN Dyspepsia  ondansetron Injectable 4 milliGRAM(s) IV Push every 8 hours PRN Nausea and/or Vomiting  simethicone 80 milliGRAM(s) Chew three times a day PRN Gas                            12.6   8.14  )-----------( 427      ( 09 Nov 2022 07:55 )             39.1       11-09    142  |  107  |  18  ----------------------------<  102<H>  4.0   |  32<H>  |  0.68    Ca    8.6      09 Nov 2022 07:55  Phos  2.9     11-09  Mg     2.2     11-09    TPro  5.6<L>  /  Alb  2.1<L>  /  TBili  0.3  /  DBili  x   /  AST  13  /  ALT  17  /  AlkPhos  80  11-09

## 2022-11-09 NOTE — PROGRESS NOTE ADULT - NEGATIVE CARDIOVASCULAR SYMPTOMS
no chest pain/no palpitations/no dyspnea on exertion/no orthopnea

## 2022-11-09 NOTE — PROGRESS NOTE ADULT - NEGATIVE SKIN SYMPTOMS
no rash/no itching/no dryness/no brittle nails/no pitted nails/no hair loss

## 2022-11-09 NOTE — PROGRESS NOTE ADULT - EYES
PERRL/EOMI/conjunctiva clear
PERRL/EOMI/conjunctiva clear/non icteric sclera
PERRL/EOMI/conjunctiva clear
PERRL/EOMI/conjunctiva clear
PERRL/EOMI/conjunctiva clear/non icteric sclera
PERRL/EOMI/conjunctiva clear
PERRL/EOMI/conjunctiva clear/non icteric sclera

## 2022-11-09 NOTE — PROGRESS NOTE ADULT - NEGATIVE GENERAL GENITOURINARY SYMPTOMS
no hematuria/no renal colic/no flank pain L/no flank pain R/no incontinence/no dysuria

## 2022-11-09 NOTE — PROGRESS NOTE ADULT - GIT GEN HX ROS MEA POS PC
nausea/vomiting/abdominal pain

## 2022-11-09 NOTE — PROGRESS NOTE ADULT - RESPIRATORY
clear to auscultation bilaterally/no wheezes/no rales/no rhonchi/no respiratory distress/no use of accessory muscles/no subcutaneous emphysema/airway patent/breath sounds equal/good air movement/respirations non-labored/no intercostal retractions
clear to auscultation bilaterally/no wheezes/no rhonchi/no respiratory distress/no use of accessory muscles/no subcutaneous emphysema/airway patent/breath sounds equal/good air movement/respirations non-labored/no intercostal retractions
clear to auscultation bilaterally/no wheezes/no rales/no rhonchi/no respiratory distress/no use of accessory muscles/no subcutaneous emphysema/airway patent/breath sounds equal/good air movement/respirations non-labored/no intercostal retractions

## 2022-11-09 NOTE — DISCHARGE NOTE NURSING/CASE MANAGEMENT/SOCIAL WORK - PATIENT PORTAL LINK FT
You can access the FollowMyHealth Patient Portal offered by [facility] by registering at the following website: [email or web address]. By joining [facility]’s FollowMyHealth portal, you will also be able to view your health information using other applications (apps) compatible with our system.

## 2022-11-09 NOTE — PROGRESS NOTE ADULT - ASSESSMENT
Patient is a 77y old  Female from home, ambulates independently, with no significant PMHx, presents to the ER for evaluation of worsening abdominal pain x 3d. She states her abdominal pain started on Thursday, after taking new abx, amoxicillin  started on Monday after a recent dental removal. She describes abdominal pain to radiate to her back with sudden nausea and vomiting waking her up on Friday morning. She states that she has not been able to hold any food/ drink down. ON Admission, she found to have no fever but Leukocytosis. The CT abd/pelvis shows Acute Pancreatitis. The ID consult requested to assist with further evaluation and antibiotic management.     # Acute Pancreatitis- most likely Gallstone pancreatitis- in the setting of Cholelithiasis seen on abdominal US from 11/1/22- MRCP as of 11/2 shows Peripancreatic edema, compatible with acute pancreatitis. 5.9 x 2.4 cm  area of walled-off necrosis at the pancreatic body/tail. Cholelithiasis  and  5 mm distal common bile duct stone.   # Leukocytosis - resolving   # Positive Urine culture- most likely a contaminant in the setting of Negative Urine analysis    would recommend:    1. May change Abx to oral Levaquin 750 mg daily (QTC is 439) and Flagyl 500 mg q 8 hours to continue until 11/14/22  2. Continue Meropenem while inpatient   3. Cholecystectomy as outpatient   4. OOB to chair    d/w patient and House  staff, Dr. Daniels    Attending Attestation:    Spent more than 35 minutes on total encounter, more than 50 % of the visit was spent counseling and/or coordinating care by the Attending physician.

## 2022-11-09 NOTE — PROGRESS NOTE ADULT - NEGATIVE GENERAL SYMPTOMS
no fever/no chills/no sweating/no anorexia/no weight loss/no polyphagia/no polyuria/no polydipsia

## 2022-11-09 NOTE — PROGRESS NOTE ADULT - CONSTITUTIONAL
no distress/cachectic

## 2022-11-09 NOTE — DISCHARGE NOTE NURSING/CASE MANAGEMENT/SOCIAL WORK - NSDCPEFALRISK_GEN_ALL_CORE
For information on Fall & Injury Prevention, visit: [email or web address] OR  [email or web address] OR  https://www.cdc.gov/steadi/patient.html

## 2022-11-09 NOTE — PROGRESS NOTE ADULT - NECK
supple/symmetric

## 2022-11-09 NOTE — PROGRESS NOTE ADULT - NEGATIVE PSYCHIATRIC SYMPTOMS
no suicidal ideation/no depression/no anxiety/no insomnia/no memory loss/no paranoia/no mood swings/no agitation

## 2022-11-30 PROBLEM — Z00.00 ENCOUNTER FOR PREVENTIVE HEALTH EXAMINATION: Status: ACTIVE | Noted: 2022-11-30

## 2022-11-30 PROBLEM — Z78.9 OTHER SPECIFIED HEALTH STATUS: Chronic | Status: ACTIVE | Noted: 2022-10-29

## 2023-01-30 ENCOUNTER — TRANSCRIPTION ENCOUNTER (OUTPATIENT)
Age: 78
End: 2023-01-30

## 2023-01-30 ENCOUNTER — APPOINTMENT (OUTPATIENT)
Dept: INTERNAL MEDICINE | Facility: CLINIC | Age: 78
End: 2023-01-30
Payer: COMMERCIAL

## 2023-01-30 VITALS
HEART RATE: 60 BPM | TEMPERATURE: 98.2 F | OXYGEN SATURATION: 96 % | RESPIRATION RATE: 16 BRPM | DIASTOLIC BLOOD PRESSURE: 78 MMHG | WEIGHT: 118 LBS | SYSTOLIC BLOOD PRESSURE: 147 MMHG

## 2023-01-30 DIAGNOSIS — Z80.7 FAMILY HISTORY OF OTHER MALIGNANT NEOPLASMS OF LYMPHOID, HEMATOPOIETIC AND RELATED TISSUES: ICD-10-CM

## 2023-01-30 DIAGNOSIS — Z13.820 ENCOUNTER FOR SCREENING FOR OSTEOPOROSIS: ICD-10-CM

## 2023-01-30 DIAGNOSIS — Z12.11 ENCOUNTER FOR SCREENING FOR MALIGNANT NEOPLASM OF COLON: ICD-10-CM

## 2023-01-30 DIAGNOSIS — Z78.9 OTHER SPECIFIED HEALTH STATUS: ICD-10-CM

## 2023-01-30 DIAGNOSIS — Z82.49 FAMILY HISTORY OF ISCHEMIC HEART DISEASE AND OTHER DISEASES OF THE CIRCULATORY SYSTEM: ICD-10-CM

## 2023-01-30 DIAGNOSIS — K80.50 CALCULUS OF BILE DUCT W/OUT CHOLANGITIS OR CHOLECYSTITIS W/OUT OBSTRUCTION: ICD-10-CM

## 2023-01-30 DIAGNOSIS — Z83.49 FAMILY HISTORY OF OTHER ENDOCRINE, NUTRITIONAL AND METABOLIC DISEASES: ICD-10-CM

## 2023-01-30 DIAGNOSIS — Z87.19 PERSONAL HISTORY OF OTHER DISEASES OF THE DIGESTIVE SYSTEM: ICD-10-CM

## 2023-01-30 PROCEDURE — 99204 OFFICE O/P NEW MOD 45 MIN: CPT | Mod: 25

## 2023-01-31 PROBLEM — Z80.7 FAMILY HISTORY OF MULTIPLE MYELOMA: Status: ACTIVE | Noted: 2023-01-30

## 2023-01-31 PROBLEM — Z83.49 FAMILY HISTORY OF OBESITY: Status: ACTIVE | Noted: 2023-01-30

## 2023-01-31 PROBLEM — Z12.11 COLON CANCER SCREENING: Status: ACTIVE | Noted: 2023-01-30

## 2023-01-31 PROBLEM — Z13.820 OSTEOPOROSIS SCREENING: Status: ACTIVE | Noted: 2023-01-30

## 2023-01-31 PROBLEM — Z78.9 NON-SMOKER: Status: ACTIVE | Noted: 2023-01-30

## 2023-01-31 PROBLEM — Z87.19 HISTORY OF PANCREATITIS: Status: RESOLVED | Noted: 2023-01-30 | Resolved: 2023-01-31

## 2023-01-31 PROBLEM — Z82.49 FAMILY HISTORY OF CARDIAC DISORDER: Status: ACTIVE | Noted: 2023-01-30

## 2023-01-31 NOTE — HEALTH RISK ASSESSMENT
[Never] : Never [No] : No [0] : 2) Feeling down, depressed, or hopeless: Not at all (0) [[id number]] : 0 [BoneDensityDate] : 2020 [BoneDensityComments] : nitin [ColonoscopyDate] : declined [ColonoscopyComments] : cologard 2020

## 2023-01-31 NOTE — PHYSICAL EXAM
[No Acute Distress] : no acute distress [Well Nourished] : well nourished [Well Developed] : well developed [Well-Appearing] : well-appearing [Normal Sclera/Conjunctiva] : normal sclera/conjunctiva [PERRL] : pupils equal round and reactive to light [EOMI] : extraocular movements intact [Normal Outer Ear/Nose] : the outer ears and nose were normal in appearance [Normal Oropharynx] : the oropharynx was normal [No JVD] : no jugular venous distention [No Lymphadenopathy] : no lymphadenopathy [Supple] : supple [Thyroid Normal, No Nodules] : the thyroid was normal and there were no nodules present [No Respiratory Distress] : no respiratory distress  [No Accessory Muscle Use] : no accessory muscle use [Clear to Auscultation] : lungs were clear to auscultation bilaterally [Normal Rate] : normal rate  [Regular Rhythm] : with a regular rhythm [Normal S1, S2] : normal S1 and S2 [No Murmur] : no murmur heard [No Carotid Bruits] : no carotid bruits [No Abdominal Bruit] : a ~M bruit was not heard ~T in the abdomen [No Varicosities] : no varicosities [Pedal Pulses Present] : the pedal pulses are present [No Edema] : there was no peripheral edema [No Palpable Aorta] : no palpable aorta [No Extremity Clubbing/Cyanosis] : no extremity clubbing/cyanosis [Normal Appearance] : normal in appearance [No Nipple Discharge] : no nipple discharge [No Axillary Lymphadenopathy] : no axillary lymphadenopathy [Soft] : abdomen soft [Non Tender] : non-tender [Non-distended] : non-distended [No Masses] : no abdominal mass palpated [Normal Bowel Sounds] : normal bowel sounds [No HSM] : no HSM [Normal Posterior Cervical Nodes] : no posterior cervical lymphadenopathy [Normal Anterior Cervical Nodes] : no anterior cervical lymphadenopathy [No CVA Tenderness] : no CVA  tenderness [No Spinal Tenderness] : no spinal tenderness [No Joint Swelling] : no joint swelling [Grossly Normal Strength/Tone] : grossly normal strength/tone [No Rash] : no rash [Coordination Grossly Intact] : coordination grossly intact [No Focal Deficits] : no focal deficits [Normal Gait] : normal gait [Deep Tendon Reflexes (DTR)] : deep tendon reflexes were 2+ and symmetric [Normal Affect] : the affect was normal [Normal Insight/Judgement] : insight and judgment were intact

## 2023-01-31 NOTE — ASSESSMENT
[FreeTextEntry1] : RTC after surgery visit
Patient states "I have intermittent abd pain and bleeding x1 one month but last night pain so bad I was crying".

## 2023-01-31 NOTE — HISTORY OF PRESENT ILLNESS
[FreeTextEntry1] : establish care [de-identified] : 77yoF PMH gallstone pancreatitis, renal cyst presents to establish care\par \par had endorsed lower abdominal pain and vomiting since 11/2021 - seen by outside GI who performed 2 EGDs which were unremarkable - prior PCP ordered abdominal US - gallbladder sludge - went to ED in 11/2022 for worsening symptoms - diagnosed with gallstone pancreatitis - s/p ERCP with sphincterotomy and removal of CBD stones - patient declined cholecystectomy during hospitalization\par \par after ERCP, endorses lower abdominal pain before BM, loose stools\par abdominal/back pain nearly resolved, loose stool improved, patient now able to resume exercise - would like to feel stronger before cholecystectomy\par \par denies fever, chills, vomiting\par \par follows with outside cardiology/accupuncture Dr Rowan\par TTE in 5/2022 unremarkable\par \par completed flu, bivalent vaccine\par UTD pneumonia vaccines

## 2023-02-11 ENCOUNTER — NON-APPOINTMENT (OUTPATIENT)
Age: 78
End: 2023-02-11

## 2023-02-11 LAB
ALBUMIN SERPL ELPH-MCNC: 4.6 G/DL
ALP BLD-CCNC: 83 U/L
ALT SERPL-CCNC: 11 U/L
ANION GAP SERPL CALC-SCNC: 13 MMOL/L
AST SERPL-CCNC: 14 U/L
BASOPHILS # BLD AUTO: 0.04 K/UL
BASOPHILS NFR BLD AUTO: 0.9 %
BILIRUB SERPL-MCNC: 0.6 MG/DL
BUN SERPL-MCNC: 11 MG/DL
CALCIUM SERPL-MCNC: 9.9 MG/DL
CHLORIDE SERPL-SCNC: 103 MMOL/L
CHOLEST SERPL-MCNC: 260 MG/DL
CO2 SERPL-SCNC: 25 MMOL/L
CREAT SERPL-MCNC: 0.74 MG/DL
EGFR: 82 ML/MIN/1.73M2
EOSINOPHIL # BLD AUTO: 0.2 K/UL
EOSINOPHIL NFR BLD AUTO: 4.5 %
ESTIMATED AVERAGE GLUCOSE: 108 MG/DL
GLUCOSE SERPL-MCNC: 86 MG/DL
HBA1C MFR BLD HPLC: 5.4 %
HCT VFR BLD CALC: 47.7 %
HDLC SERPL-MCNC: 77 MG/DL
HGB BLD-MCNC: 15.2 G/DL
IMM GRANULOCYTES NFR BLD AUTO: 0.2 %
LDLC SERPL CALC-MCNC: 167 MG/DL
LYMPHOCYTES # BLD AUTO: 1.3 K/UL
LYMPHOCYTES NFR BLD AUTO: 29.5 %
MAN DIFF?: NORMAL
MCHC RBC-ENTMCNC: 28.3 PG
MCHC RBC-ENTMCNC: 31.9 GM/DL
MCV RBC AUTO: 88.7 FL
MONOCYTES # BLD AUTO: 0.55 K/UL
MONOCYTES NFR BLD AUTO: 12.5 %
NEUTROPHILS # BLD AUTO: 2.31 K/UL
NEUTROPHILS NFR BLD AUTO: 52.4 %
NONHDLC SERPL-MCNC: 184 MG/DL
PLATELET # BLD AUTO: 289 K/UL
POTASSIUM SERPL-SCNC: 4.6 MMOL/L
PROT SERPL-MCNC: 6.8 G/DL
RBC # BLD: 5.38 M/UL
RBC # FLD: 13.1 %
SODIUM SERPL-SCNC: 141 MMOL/L
TRIGL SERPL-MCNC: 83 MG/DL
TSH SERPL-ACNC: 2.98 UIU/ML
WBC # FLD AUTO: 4.41 K/UL

## 2023-02-24 ENCOUNTER — OUTPATIENT (OUTPATIENT)
Dept: OUTPATIENT SERVICES | Facility: HOSPITAL | Age: 78
LOS: 1 days | End: 2023-02-24
Payer: MEDICARE

## 2023-02-24 ENCOUNTER — APPOINTMENT (OUTPATIENT)
Dept: CT IMAGING | Facility: HOSPITAL | Age: 78
End: 2023-02-24

## 2023-02-24 ENCOUNTER — RESULT REVIEW (OUTPATIENT)
Age: 78
End: 2023-02-24

## 2023-02-24 DIAGNOSIS — K85.10 BILIARY ACUTE PANCREATITIS WITHOUT NECROSIS OR INFECTION: ICD-10-CM

## 2023-02-24 PROCEDURE — 74177 CT ABD & PELVIS W/CONTRAST: CPT | Mod: 26

## 2023-02-24 PROCEDURE — 74177 CT ABD & PELVIS W/CONTRAST: CPT

## 2023-03-17 ENCOUNTER — APPOINTMENT (OUTPATIENT)
Dept: GASTROENTEROLOGY | Facility: CLINIC | Age: 78
End: 2023-03-17
Payer: MEDICARE

## 2023-03-17 VITALS
RESPIRATION RATE: 16 BRPM | BODY MASS INDEX: 23.75 KG/M2 | HEIGHT: 60 IN | OXYGEN SATURATION: 98 % | WEIGHT: 121 LBS | DIASTOLIC BLOOD PRESSURE: 81 MMHG | SYSTOLIC BLOOD PRESSURE: 149 MMHG | TEMPERATURE: 97.3 F | HEART RATE: 95 BPM

## 2023-03-17 DIAGNOSIS — K85.10 BILIARY ACUTE PANCREATITIS WITHOUT NECROSIS OR INFECTION: ICD-10-CM

## 2023-03-17 DIAGNOSIS — K80.20 CALCULUS OF GALLBLADDER W/OUT CHOLECYSTITIS W/OUT OBSTRUCTION: ICD-10-CM

## 2023-03-17 DIAGNOSIS — Z98.890 OTHER SPECIFIED POSTPROCEDURAL STATES: ICD-10-CM

## 2023-03-17 DIAGNOSIS — K85.91 ACUTE PANCREATITIS WITH UNINFECTED NECROSIS, UNSPECIFIED: ICD-10-CM

## 2023-03-17 PROCEDURE — 99204 OFFICE O/P NEW MOD 45 MIN: CPT

## 2023-03-17 NOTE — ASSESSMENT
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 Pt states has been doing well since prior hospitalization. No abd pain. Eating well. Repeat CT 2/24 showed resolving pancreatitis related collection, 6->2cm. Was recommended for cholecystectomy but pt hesitant regarding surgery. \par - Advised cholecystectomy to prevent recurrence khanh given severity of prior pancreatitis. Discussed risk of gallstone related complications in the future i.e. recurrent choledocholithiasis, cholecystitis, pancreatitis. \par - F/u with Surgery.\par - Repeat CT in 6 months to ensure resolution of small collection, no indication for intervention at this time, pt asymptomatic.

## 2023-03-17 NOTE — HISTORY OF PRESENT ILLNESS
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 Pt states has been doing well since prior hospitalization. No abd pain. Eating well. Repeat CT 2/24 showed resolving pancreatitis related collection, 6->2cm. Was recommended for cholecystectomy but pt hesitant regarding surgery. Denies any other complaints today, no abd pain, n/v/d/c, melena, hematochezia, fever/chills, or other issues.

## 2023-03-17 NOTE — PHYSICAL EXAM
[Alert] : alert [Normal Voice/Communication] : normal voice/communication [Healthy Appearing] : healthy appearing [No Acute Distress] : no acute distress [Sclera] : the sclera and conjunctiva were normal [Hearing Threshold Finger Rub Not [place]] : hearing was normal [Normal Lips/Gums] : the lips and gums were normal [Oropharynx] : the oropharynx was normal [Normal Appearance] : the appearance of the neck was normal [No Neck Mass] : no neck mass was observed [No Respiratory Distress] : no respiratory distress [No Acc Muscle Use] : no accessory muscle use [Respiration, Rhythm And Depth] : normal respiratory rhythm and effort [Auscultation Breath Sounds / Voice Sounds] : lungs were clear to auscultation bilaterally [Heart Rate And Rhythm] : heart rate was normal and rhythm regular [Normal S1, S2] : normal S1 and S2 [Murmurs] : no murmurs [Bowel Sounds] : normal bowel sounds [Abdomen Tenderness] : non-tender [No Masses] : no abdominal mass palpated [Abdomen Soft] : soft [] : no hepatosplenomegaly [Oriented To Time, Place, And Person] : oriented to person, place, and time

## 2023-06-07 NOTE — PATIENT PROFILE ADULT - FALL HARM RISK - DEVICES
Have You Had Fillers Before?: has had previous filler treatment
What Is Your Overall Satisfaction Level With The Current Outcome?: satisfied
supervision/None

## 2023-06-15 NOTE — PROGRESS NOTE ADULT - NEGATIVE HEMATOLOGY SYMPTOMS
no gum bleeding/no nose bleeding/no skin lumps
Abdominal Pain, N/V/D
no gum bleeding/no nose bleeding/no skin lumps

## 2024-09-10 NOTE — PROGRESS NOTE ADULT - SUBJECTIVE AND OBJECTIVE BOX
Conjuntivae and eyelids appear normal,  Sclerae : White without injection   Patient is seen and examined at the bed side, is afebrile. She still has abdominal pain and stay NPO. The Leukocytosis is trending down.      REVIEW OF SYSTEMS: All other review systems are negative        ALLERGIES: No Known Allergies        Vital Signs Last 24 Hrs  T(C): 36.7 (31 Oct 2022 14:05), Max: 37.3 (31 Oct 2022 06:04)  T(F): 98 (31 Oct 2022 14:05), Max: 99.2 (31 Oct 2022 06:04)  HR: 81 (31 Oct 2022 14:05) (76 - 89)  BP: 163/69 (31 Oct 2022 14:05) (152/81 - 181/77)  BP(mean): --  RR: 20 (31 Oct 2022 14:05) (17 - 20)  SpO2: 93% (31 Oct 2022 14:05) (92% - 97%)    Parameters below as of 31 Oct 2022 14:05  Patient On (Oxygen Delivery Method): room air        PHYSICAL EXAM:  GENERAL: Not in distress   CHEST/LUNG: Not using accessory muscles   HEART: s1 and s2 present  ABDOMEN:  epigastric and right sided tenderness   EXTREMITIES: No pedal  edema  CNS: Awake and Alert      LABS:                        14.2   12.82 )-----------( 186      ( 31 Oct 2022 07:55 )             42.5                           14.7   16.07 )-----------( 184      ( 30 Oct 2022 03:20 )             44.3       10-31    146<H>  |  111<H>  |  20<H>  ----------------------------<  93  3.5   |  25  |  0.58    Ca    8.6      31 Oct 2022 07:55  Phos  1.2     10-31  Mg     2.3     10-31    TPro  6.2  /  Alb  2.4<L>  /  TBili  0.7  /  DBili  x   /  AST  18  /  ALT  27  /  AlkPhos  88  10-31    10-30    143  |  111<H>  |  19<H>  ----------------------------<  117<H>  3.3<L>   |  27  |  0.65    Ca    8.1<L>      30 Oct 2022 03:20  Phos  2.0     10-30  Mg     1.9     10-30    TPro  5.9<L>  /  Alb  2.6<L>  /  TBili  0.8  /  DBili  x   /  AST  25  /  ALT  39  /  AlkPhos  82  10-30        Urinalysis Basic - ( 29 Oct 2022 14:40 )  Color: Raina / Appearance: Clear / S.025 / pH: x  Gluc: x / Ketone: Small  / Bili: Negative / Urobili: Negative   Blood: x / Protein: 100 / Nitrite: Negative   Leuk Esterase: Trace / RBC: 5-10 /HPF / WBC 0-2 /HPF   Sq Epi: x / Non Sq Epi: Negative /HPF / Bacteria: Negative /HPF        MEDICATIONS  (STANDING):    enoxaparin Injectable 40 milliGRAM(s) SubCutaneous every 24 hours  lactated ringers. 1000 milliLiter(s) (200 mL/Hr) IV Continuous <Continuous>        RADIOLOGY & ADDITIONAL TESTS:      10/29/22: CT Abdomen and Pelvis w/ IV Cont (10.29.22 @ 13:37) >Acute pancreatitis with focal area of decreased enhancement in the   proximal anterior pancreatic body.        MICROBIOLOGY DATA:    Culture - Urine (10.29.22 @ 14:40)   - Amikacin: S <=16   - Amoxicillin/Clavulanic Acid: S <=8/4   - Ampicillin: R >16 These ampicillin results predict results for amoxicillin   - Ampicillin/Sulbactam: S 8/ Enterobacter, Klebsiella aerogenes, Citrobacter, and Serratia may develop resistance during prolonged therapy (3-4 days)   - Aztreonam: S <=4   - Cefazolin: S <=2 For uncomplicated UTI with K. pneumoniae, E. coli, or P. mirablis: JACINTO <=16 is sensitive and JACINTO >=32 is resistant. This also predicts results for oral agents cefaclor, cefdinir, cefpodoxime, cefprozil, cefuroxime axetil, cephalexin and locarbef for uncomplicated UTI. Note that some isolates may be susceptible to these agents while testing resistant to cefazolin.   - Cefepime: S <=2   - Cefoxitin: S <=8   - Ceftriaxone: S <=1 Enterobacter, Klebsiella aerogenes, Citrobacter, and Serratia may develop resistance during prolonged therapy   - Ciprofloxacin: S <=0.25   - Ertapenem: S <=0.5   - Gentamicin: S <=2   - Imipenem: S <=1   - Levofloxacin: S <=0.5   - Meropenem: S <=1   - Nitrofurantoin: I 64 Should not be used to treat pyelonephritis   - Piperacillin/Tazobactam: S <=8   - Tigecycline: S <=2   - Tobramycin: S <=2   - Trimethoprim/Sulfamethoxazole: S <=0.5/9.5   Specimen Source: Clean Catch Clean Catch (Midstream)   Culture Results:   10,000 - 49,000 CFU/mL Klebsiella pneumoniae   <10,000 CFU/ml Normal Urogenital vito present   Organism Identification: Klebsiella pneumoniae   Organism: Klebsiella pneumoniae   COVID-19 PCR . (10.29.22 @ 14:40)   COVID-19 PCR: NotDetec:

## 2025-03-08 ENCOUNTER — NON-APPOINTMENT (OUTPATIENT)
Age: 80
End: 2025-03-08

## 2025-03-08 ENCOUNTER — APPOINTMENT (OUTPATIENT)
Dept: INTERNAL MEDICINE | Facility: CLINIC | Age: 80
End: 2025-03-08

## 2025-03-08 VITALS
SYSTOLIC BLOOD PRESSURE: 150 MMHG | HEIGHT: 60 IN | DIASTOLIC BLOOD PRESSURE: 74 MMHG | HEART RATE: 69 BPM | WEIGHT: 143 LBS | RESPIRATION RATE: 16 BRPM | OXYGEN SATURATION: 95 % | TEMPERATURE: 97.3 F | BODY MASS INDEX: 28.07 KG/M2

## 2025-03-08 VITALS — SYSTOLIC BLOOD PRESSURE: 134 MMHG | DIASTOLIC BLOOD PRESSURE: 83 MMHG

## 2025-03-08 DIAGNOSIS — K85.10 BILIARY ACUTE PANCREATITIS WITHOUT NECROSIS OR INFECTION: ICD-10-CM

## 2025-03-08 DIAGNOSIS — G57.90 UNSPECIFIED MONONEUROPATHY OF UNSPECIFIED LOWER LIMB: ICD-10-CM

## 2025-03-08 DIAGNOSIS — Z00.00 ENCOUNTER FOR GENERAL ADULT MEDICAL EXAMINATION W/OUT ABNORMAL FINDINGS: ICD-10-CM

## 2025-03-08 DIAGNOSIS — R03.0 ELEVATED BLOOD-PRESSURE READING, W/OUT DIAGNOSIS OF HYPERTENSION: ICD-10-CM

## 2025-03-08 PROCEDURE — G0439: CPT

## 2025-03-08 PROCEDURE — 93000 ELECTROCARDIOGRAM COMPLETE: CPT

## 2025-03-08 RX ORDER — MULTIVITAMIN
TABLET ORAL
Refills: 0 | Status: ACTIVE | COMMUNITY

## 2025-03-11 PROBLEM — R03.0 ELEVATED BLOOD PRESSURE READING: Status: ACTIVE | Noted: 2025-03-11

## 2025-03-11 PROBLEM — G57.90 NEUROPATHY OF FOOT, UNSPECIFIED LATERALITY: Status: ACTIVE | Noted: 2025-03-08

## 2025-03-25 ENCOUNTER — APPOINTMENT (OUTPATIENT)
Dept: ULTRASOUND IMAGING | Facility: CLINIC | Age: 80
End: 2025-03-25
Payer: MEDICARE

## 2025-03-25 ENCOUNTER — APPOINTMENT (OUTPATIENT)
Dept: RADIOLOGY | Facility: CLINIC | Age: 80
End: 2025-03-25
Payer: MEDICARE

## 2025-03-25 PROCEDURE — 77080 DXA BONE DENSITY AXIAL: CPT

## 2025-03-25 PROCEDURE — 76705 ECHO EXAM OF ABDOMEN: CPT

## 2025-03-28 ENCOUNTER — NON-APPOINTMENT (OUTPATIENT)
Age: 80
End: 2025-03-28

## 2025-04-04 ENCOUNTER — NON-APPOINTMENT (OUTPATIENT)
Age: 80
End: 2025-04-04

## 2025-05-30 ENCOUNTER — EMERGENCY (EMERGENCY)
Facility: HOSPITAL | Age: 80
LOS: 1 days | End: 2025-05-30
Attending: STUDENT IN AN ORGANIZED HEALTH CARE EDUCATION/TRAINING PROGRAM
Payer: MEDICARE

## 2025-05-30 VITALS
DIASTOLIC BLOOD PRESSURE: 65 MMHG | TEMPERATURE: 98 F | HEART RATE: 73 BPM | SYSTOLIC BLOOD PRESSURE: 124 MMHG | RESPIRATION RATE: 18 BRPM | OXYGEN SATURATION: 98 %

## 2025-05-30 VITALS
SYSTOLIC BLOOD PRESSURE: 145 MMHG | RESPIRATION RATE: 16 BRPM | DIASTOLIC BLOOD PRESSURE: 68 MMHG | HEART RATE: 78 BPM | TEMPERATURE: 98 F | OXYGEN SATURATION: 96 % | WEIGHT: 143.08 LBS | HEIGHT: 63 IN

## 2025-05-30 LAB
ANION GAP SERPL CALC-SCNC: 2 MMOL/L — LOW (ref 5–17)
BUN SERPL-MCNC: 13 MG/DL — SIGNIFICANT CHANGE UP (ref 7–18)
CALCIUM SERPL-MCNC: 9.2 MG/DL — SIGNIFICANT CHANGE UP (ref 8.4–10.5)
CHLORIDE SERPL-SCNC: 111 MMOL/L — HIGH (ref 96–108)
CO2 SERPL-SCNC: 27 MMOL/L — SIGNIFICANT CHANGE UP (ref 22–31)
CREAT SERPL-MCNC: 0.79 MG/DL — SIGNIFICANT CHANGE UP (ref 0.5–1.3)
EGFR: 75 ML/MIN/1.73M2 — SIGNIFICANT CHANGE UP
EGFR: 75 ML/MIN/1.73M2 — SIGNIFICANT CHANGE UP
GLUCOSE SERPL-MCNC: 92 MG/DL — SIGNIFICANT CHANGE UP (ref 70–99)
HCT VFR BLD CALC: 45.5 % — HIGH (ref 34.5–45)
HGB BLD-MCNC: 15.1 G/DL — SIGNIFICANT CHANGE UP (ref 11.5–15.5)
MCHC RBC-ENTMCNC: 29.1 PG — SIGNIFICANT CHANGE UP (ref 27–34)
MCHC RBC-ENTMCNC: 33.2 G/DL — SIGNIFICANT CHANGE UP (ref 32–36)
MCV RBC AUTO: 87.7 FL — SIGNIFICANT CHANGE UP (ref 80–100)
NRBC BLD AUTO-RTO: 0 /100 WBCS — SIGNIFICANT CHANGE UP (ref 0–0)
PLATELET # BLD AUTO: 222 K/UL — SIGNIFICANT CHANGE UP (ref 150–400)
POTASSIUM SERPL-MCNC: 4 MMOL/L — SIGNIFICANT CHANGE UP (ref 3.5–5.3)
POTASSIUM SERPL-SCNC: 4 MMOL/L — SIGNIFICANT CHANGE UP (ref 3.5–5.3)
RBC # BLD: 5.19 M/UL — SIGNIFICANT CHANGE UP (ref 3.8–5.2)
RBC # FLD: 12.8 % — SIGNIFICANT CHANGE UP (ref 10.3–14.5)
SODIUM SERPL-SCNC: 140 MMOL/L — SIGNIFICANT CHANGE UP (ref 135–145)
WBC # BLD: 7.77 K/UL — SIGNIFICANT CHANGE UP (ref 3.8–10.5)
WBC # FLD AUTO: 7.77 K/UL — SIGNIFICANT CHANGE UP (ref 3.8–10.5)

## 2025-05-30 PROCEDURE — 82962 GLUCOSE BLOOD TEST: CPT

## 2025-05-30 PROCEDURE — 99284 EMERGENCY DEPT VISIT MOD MDM: CPT

## 2025-05-30 PROCEDURE — 80048 BASIC METABOLIC PNL TOTAL CA: CPT

## 2025-05-30 PROCEDURE — 85027 COMPLETE CBC AUTOMATED: CPT

## 2025-05-30 PROCEDURE — 96375 TX/PRO/DX INJ NEW DRUG ADDON: CPT

## 2025-05-30 PROCEDURE — 36415 COLL VENOUS BLD VENIPUNCTURE: CPT

## 2025-05-30 PROCEDURE — 99284 EMERGENCY DEPT VISIT MOD MDM: CPT | Mod: 25

## 2025-05-30 PROCEDURE — 96374 THER/PROPH/DIAG INJ IV PUSH: CPT

## 2025-05-30 RX ORDER — METOCLOPRAMIDE HCL 10 MG
1 TABLET ORAL
Qty: 6 | Refills: 0
Start: 2025-05-30 | End: 2025-06-01

## 2025-05-30 RX ORDER — METOCLOPRAMIDE HCL 10 MG
10 TABLET ORAL ONCE
Refills: 0 | Status: COMPLETED | OUTPATIENT
Start: 2025-05-30 | End: 2025-05-30

## 2025-05-30 RX ORDER — ACETAMINOPHEN 500 MG/5ML
2 LIQUID (ML) ORAL
Qty: 112 | Refills: 0
Start: 2025-05-30 | End: 2025-06-12

## 2025-05-30 RX ORDER — IBUPROFEN 200 MG
1 TABLET ORAL
Qty: 30 | Refills: 0
Start: 2025-05-30 | End: 2025-06-08

## 2025-05-30 RX ORDER — ACETAMINOPHEN 500 MG/5ML
1000 LIQUID (ML) ORAL ONCE
Refills: 0 | Status: COMPLETED | OUTPATIENT
Start: 2025-05-30 | End: 2025-05-30

## 2025-05-30 RX ADMIN — Medication 10 MILLIGRAM(S): at 20:08

## 2025-05-30 RX ADMIN — Medication 400 MILLIGRAM(S): at 20:08

## 2025-05-30 NOTE — ED ADULT NURSE NOTE - NSFALLUNIVINTERV_ED_ALL_ED
Bed/Stretcher in lowest position, wheels locked, appropriate side rails in place/Call bell, personal items and telephone in reach/Instruct patient to call for assistance before getting out of bed/chair/stretcher/Non-slip footwear applied when patient is off stretcher/[place] to call system/Physically safe environment - no spills, clutter or unnecessary equipment/Purposeful proactive rounding/Room/bathroom lighting operational, light cord in reach

## 2025-05-30 NOTE — ED PROVIDER NOTE - PHYSICAL EXAMINATION
GENERAL APPEARANCE:  AAOx3, generally well-appearing, no acute distress. Appears stated age.  HEENT:  NCAT. Moist mucous membranes. EOMI, clear conjunctiva, no slceral icterus, oropharynx clear.  NECK:  Supple without lymphadenopathy. No JVD.  No neck stiffness or restricted ROM.  HEART:  Normal heart rate and regular rhythm. No murmur.   LUNGS:  CTAB, moving air well. No crackles or wheezes are heard.  ABDOMEN:  Soft, nontender, non-distended. Negative Gonzalez. Negative McBurney. No rebound or guarding.  CHEST/BACK: Chest wall non-tender. No CVAT, or midline cervical/thoracic/lumbar tenderness to palpation. No obvious deformity of chest wall or back.  EXTREMITIES:  Without cyanosis, clubbing or edema. Pulse intact x 4. FROM x4. Compartments soft in all extremities.  NEUROLOGICAL:  Grossly non-focal. Alert and oriented, moving all 4 extremities. CN II-XII grossly intact. Observed to ambulate with normal gait.  SKIN:  Warm and dry without any rash.  PSYCH: Calm, cooperative. Normal mood and affect. Demonstrates proper insight and judgement.

## 2025-05-30 NOTE — ED ADULT TRIAGE NOTE - CHIEF COMPLAINT QUOTE
C/o headache, right eye pain and vision change b/l  since 5am. Tingling in fingers in left hand x 2 hours. Gait unsteady gait per pt

## 2025-05-30 NOTE — ED PROVIDER NOTE - OBJECTIVE STATEMENT
80 female presents for right eye pain.  Patient reports 3 days of symptoms.  Reports they progressively worsen.  Reports during the last 3 days she has been walking throughout [place] visiting museums and overall what she reports is active.  Patient states today she woke up and attempted to do yoga but her right eye was hurting.  Reports history of migraines and states the pain is similar but somewhat more persistent and strong.  On arrival patient reported decreased vision in bilateral eyes secondary to pain.  Triage nurse activated stroke code.  Upon my arrival code canceled, NIH 0, symptoms not consistent with CVA.  Patient denies numbness difficulty speaking chest pain shortness of breath dizziness or focal weakness.

## 2025-05-30 NOTE — ED ADULT NURSE NOTE - NSFALLCONCLUSION_ED_ALL_ED
Occupational Therapy    Visit Type: treatment  Nursing comments: RN reports this is an okay time for therapy.  Present at bedside: Mother (Sister)  Precautions: contact and droplet    SUBJECTIVE  Mother at bedside. Pt standing, walking around room at start of session. Reporting he wants to brush his teeth. When asked if he needed to sit throughout grooming, answering, \"no\". When sitting in recliner following grooming, stating, \"I don't know why but I kind of feel like I need to throw up\". Discussing using external cues to take breaks with mom.    Therapist donned gown, gloves, protective eyewear, and procedure mask for session.      Patient / Family Goals: return to previous functional status    Pain     At onset of session (out of 10): 0    OBJECTIVE        Level of consciousness: alert  Arousal alertness: appropriate responses to stimuli  Affect/Behavior: alert, appropriate, calm and cooperative  Patient activity tolerance: 1 to 1 activity to rest  Functional Communication/Cognition:   - Form of communication:  Verbal   - Safety judgement: decreased awareness of need for safety.   - Awareness of deficits: decreased awareness of deficits.  Range of Motion (measured in degrees unless otherwise stated, active unless noted)  WNL: LUE, RUE    Strength (out of 5 unless otherwise indicated)  5/5: LUE and RUE    Balance  Sitting:    Static:  independent     Dynamic:  independent  Standing - Firm Surface - Eyes Open:     Static: supervision    Dynamic:  contact guard/touching/steadying assist    Transfers:      Sit to stand: supervision    Stand to sit: supervision    Activities of Daily Living (ADLs):  Grooming/Oral Hygiene:     Oral hygiene assist: supervision (Heavy breathing during grooming, verbal cueing to take a seated rest break)    Position: standing at sink    Assist needed for: verbal cueing and supervision/safety  Upper Body Dressing:    Assist: minimal assist (verbal cueing for front back)    Position: chair     Assist needed for: set up and verbal cueing  Lower Body Dressing:     Assist: minimal assist (verbal cueing for rest break)    Footwear assistance: supervision    Assist needed for: set up, verbal cueing and supervision/safety       Interventions    Positional:-ADL: teeth brushing, UE dressing - doff, UE dressing - don, LE dressing - doff and LE dressing -don  Additional activities completed by position detailed in objective above.    Education Completed    Person instructed: mother and patient    Education completed: role of OT, energy conservation and HEP    Educated pt and mom on importance of taking frequent rest breaks when pt is engaging in OOB activity. Discussed with mom using a visual timer to cue pt for break times. When pt is engaging more strenuous activity, must take a break after 1-2 minutes. When pt engaging in more mild activity, must take a break after 4-5 minutes. Break must be double the activity time. Mom verbalizing understanding.           ASSESSMENT    - Impairments: activity tolerance  : ADL, IADL, Leisure.     Discharge Recommendations:   Recommendations for Discharge: OT IL: Patient requires intermittent nonskilled assistance to perform mobility and/or ADLs safely     PT/OT Mobility Equipment for Discharge: NA  PT/OT ADL Equipment for Discharge: NA        Skilled therapy is required to address these limitations in attempt to maximize the patient's independence.  - Progress: progressing toward goals and improving as expected    Pain at end of session:  0/10    End of Session:  Location: in chair  Safety measures: call light within reach  Handoff to: nurse and family/caregiver    PLAN  Suggestions for next session as indicated: Pt is a 7 year old boy with history of asthma who presented to hospital with acute resp failure with hypoxia. History of school services for unspecified delays. Family and pt welcoming and participatory today. Some impulsivity noted with direction following. Pt is  emerging with activity tolerance, however remains limited by medical interventions and prolonged hospital stay. Pt requiring verbal cueing to take breaks, difficulty understanding when he needs rest. Discussed using external cueing with mom and visual timers to cue for breaks. Pt will beneift from continued OT with emphasis on dynamic standing balance and overall activity tolerance to progress towards independent ADLs and participation in leisure activity.   Interventions: activity tolerance training  Agreement to plan and goals: Parent/caregiver agrees with goals and treatment plan          GOALS    Long Term Goals: (to be met by time of discharge from hospital)  Grooming: Patient will complete grooming tasks in standing and at sink supervision.  Status: progressing/ongoing  Upper body dressing: Patient will complete upper body dressing in sitting independent.  Status: progressing/ongoing  Lower body dressing: Patient will complete lower body dressing in sitting and in standing modified independent.  Status: progressing/ongoing        Documented in the chart in the following areas: Assessment. Patient Education.      Therapy procedure time and total treatment time can be found documented on the Time Entry flowsheet   Universal Safety Interventions

## 2025-05-30 NOTE — ED PROVIDER NOTE - PATIENT PORTAL LINK FT
You can access the FollowMyHealth Patient Portal offered by [facility] by registering at the following website: [email or web address]. By joining [employer]’s FollowMyHealth portal, you will also be able to view your health information using other applications (apps) compatible with our system.

## 2025-06-04 ENCOUNTER — NON-APPOINTMENT (OUTPATIENT)
Age: 80
End: 2025-06-04

## 2025-06-04 ENCOUNTER — APPOINTMENT (OUTPATIENT)
Dept: NEUROLOGY | Facility: CLINIC | Age: 80
End: 2025-06-04
Payer: MEDICARE

## 2025-06-04 ENCOUNTER — INPATIENT (INPATIENT)
Facility: HOSPITAL | Age: 80
LOS: 1 days | Discharge: HOME HEALTH | End: 2025-06-06
Attending: PSYCHIATRY & NEUROLOGY | Admitting: PSYCHIATRY & NEUROLOGY
Payer: MEDICARE

## 2025-06-04 VITALS
HEART RATE: 75 BPM | DIASTOLIC BLOOD PRESSURE: 84 MMHG | BODY MASS INDEX: 28.07 KG/M2 | SYSTOLIC BLOOD PRESSURE: 151 MMHG | WEIGHT: 143 LBS | TEMPERATURE: 98.3 F | HEIGHT: 60 IN | OXYGEN SATURATION: 96 %

## 2025-06-04 VITALS
WEIGHT: 143.08 LBS | HEART RATE: 70 BPM | TEMPERATURE: 98 F | HEIGHT: 63 IN | OXYGEN SATURATION: 97 % | DIASTOLIC BLOOD PRESSURE: 72 MMHG | SYSTOLIC BLOOD PRESSURE: 169 MMHG | RESPIRATION RATE: 18 BRPM

## 2025-06-04 DIAGNOSIS — Z82.49 FAMILY HISTORY OF ISCHEMIC HEART DISEASE AND OTHER DISEASES OF THE CIRCULATORY SYSTEM: ICD-10-CM

## 2025-06-04 DIAGNOSIS — H57.11 OCULAR PAIN, RIGHT EYE: ICD-10-CM

## 2025-06-04 DIAGNOSIS — Z78.9 OTHER SPECIFIED HEALTH STATUS: ICD-10-CM

## 2025-06-04 DIAGNOSIS — Z87.891 PERSONAL HISTORY OF NICOTINE DEPENDENCE: ICD-10-CM

## 2025-06-04 DIAGNOSIS — R20.2 PARESTHESIA OF SKIN: ICD-10-CM

## 2025-06-04 DIAGNOSIS — H54.3 UNQUALIFIED VISUAL LOSS, BOTH EYES: ICD-10-CM

## 2025-06-04 LAB
A1C WITH ESTIMATED AVERAGE GLUCOSE RESULT: 5.3 % — SIGNIFICANT CHANGE UP (ref 4–5.6)
ALBUMIN SERPL ELPH-MCNC: 4.2 G/DL — SIGNIFICANT CHANGE UP (ref 3.3–5)
ALP SERPL-CCNC: 60 U/L — SIGNIFICANT CHANGE UP (ref 40–120)
ALT FLD-CCNC: 15 U/L — SIGNIFICANT CHANGE UP (ref 10–45)
ANION GAP SERPL CALC-SCNC: 11 MMOL/L — SIGNIFICANT CHANGE UP (ref 5–17)
AST SERPL-CCNC: 21 U/L — SIGNIFICANT CHANGE UP (ref 10–40)
BASOPHILS # BLD AUTO: 0.03 K/UL — SIGNIFICANT CHANGE UP (ref 0–0.2)
BASOPHILS NFR BLD AUTO: 0.5 % — SIGNIFICANT CHANGE UP (ref 0–2)
BILIRUB SERPL-MCNC: 0.4 MG/DL — SIGNIFICANT CHANGE UP (ref 0.2–1.2)
BUN SERPL-MCNC: 17 MG/DL — SIGNIFICANT CHANGE UP (ref 7–23)
CALCIUM SERPL-MCNC: 9.3 MG/DL — SIGNIFICANT CHANGE UP (ref 8.4–10.5)
CHLORIDE SERPL-SCNC: 109 MMOL/L — HIGH (ref 96–108)
CHOLEST SERPL-MCNC: 219 MG/DL — HIGH
CO2 SERPL-SCNC: 24 MMOL/L — SIGNIFICANT CHANGE UP (ref 22–31)
CREAT SERPL-MCNC: 0.77 MG/DL — SIGNIFICANT CHANGE UP (ref 0.5–1.3)
CRP SERPL-MCNC: <3 MG/L — SIGNIFICANT CHANGE UP (ref 0–4)
EGFR: 77 ML/MIN/1.73M2 — SIGNIFICANT CHANGE UP
EGFR: 77 ML/MIN/1.73M2 — SIGNIFICANT CHANGE UP
EOSINOPHIL # BLD AUTO: 0.07 K/UL — SIGNIFICANT CHANGE UP (ref 0–0.5)
EOSINOPHIL NFR BLD AUTO: 1.1 % — SIGNIFICANT CHANGE UP (ref 0–6)
ERYTHROCYTE [SEDIMENTATION RATE] IN BLOOD: 9 MM/HR — SIGNIFICANT CHANGE UP (ref 0–20)
ESTIMATED AVERAGE GLUCOSE: 105 MG/DL — SIGNIFICANT CHANGE UP (ref 68–114)
GLUCOSE SERPL-MCNC: 101 MG/DL — HIGH (ref 70–99)
HCT VFR BLD CALC: 44.8 % — SIGNIFICANT CHANGE UP (ref 34.5–45)
HDLC SERPL-MCNC: 60 MG/DL — SIGNIFICANT CHANGE UP
HGB BLD-MCNC: 15.1 G/DL — SIGNIFICANT CHANGE UP (ref 11.5–15.5)
IMM GRANULOCYTES NFR BLD AUTO: 0.3 % — SIGNIFICANT CHANGE UP (ref 0–0.9)
LDLC SERPL-MCNC: 141 MG/DL — HIGH
LIPID PNL WITH DIRECT LDL SERPL: 141 MG/DL — HIGH
LYMPHOCYTES # BLD AUTO: 1.23 K/UL — SIGNIFICANT CHANGE UP (ref 1–3.3)
LYMPHOCYTES # BLD AUTO: 19.8 % — SIGNIFICANT CHANGE UP (ref 13–44)
MCHC RBC-ENTMCNC: 30 PG — SIGNIFICANT CHANGE UP (ref 27–34)
MCHC RBC-ENTMCNC: 33.7 G/DL — SIGNIFICANT CHANGE UP (ref 32–36)
MCV RBC AUTO: 88.9 FL — SIGNIFICANT CHANGE UP (ref 80–100)
MONOCYTES # BLD AUTO: 0.51 K/UL — SIGNIFICANT CHANGE UP (ref 0–0.9)
MONOCYTES NFR BLD AUTO: 8.2 % — SIGNIFICANT CHANGE UP (ref 2–14)
NEUTROPHILS # BLD AUTO: 4.35 K/UL — SIGNIFICANT CHANGE UP (ref 1.8–7.4)
NEUTROPHILS NFR BLD AUTO: 70.1 % — SIGNIFICANT CHANGE UP (ref 43–77)
NONHDLC SERPL-MCNC: 159 MG/DL — HIGH
NRBC BLD AUTO-RTO: 0 /100 WBCS — SIGNIFICANT CHANGE UP (ref 0–0)
PLATELET # BLD AUTO: 229 K/UL — SIGNIFICANT CHANGE UP (ref 150–400)
POTASSIUM SERPL-MCNC: 4.1 MMOL/L — SIGNIFICANT CHANGE UP (ref 3.5–5.3)
POTASSIUM SERPL-SCNC: 4.1 MMOL/L — SIGNIFICANT CHANGE UP (ref 3.5–5.3)
PROT SERPL-MCNC: 6.9 G/DL — SIGNIFICANT CHANGE UP (ref 6–8.3)
RBC # BLD: 5.04 M/UL — SIGNIFICANT CHANGE UP (ref 3.8–5.2)
RBC # FLD: 12.8 % — SIGNIFICANT CHANGE UP (ref 10.3–14.5)
SODIUM SERPL-SCNC: 144 MMOL/L — SIGNIFICANT CHANGE UP (ref 135–145)
TRIGL SERPL-MCNC: 104 MG/DL — SIGNIFICANT CHANGE UP
WBC # BLD: 6.21 K/UL — SIGNIFICANT CHANGE UP (ref 3.8–10.5)
WBC # FLD AUTO: 6.21 K/UL — SIGNIFICANT CHANGE UP (ref 3.8–10.5)

## 2025-06-04 PROCEDURE — 70450 CT HEAD/BRAIN W/O DYE: CPT | Mod: 26,XU

## 2025-06-04 PROCEDURE — 99205 OFFICE O/P NEW HI 60 MIN: CPT

## 2025-06-04 PROCEDURE — 70498 CT ANGIOGRAPHY NECK: CPT | Mod: 26

## 2025-06-04 PROCEDURE — 70496 CT ANGIOGRAPHY HEAD: CPT | Mod: 26

## 2025-06-04 PROCEDURE — 99285 EMERGENCY DEPT VISIT HI MDM: CPT

## 2025-06-04 PROCEDURE — 99223 1ST HOSP IP/OBS HIGH 75: CPT | Mod: 25

## 2025-06-04 PROCEDURE — G2211 COMPLEX E/M VISIT ADD ON: CPT

## 2025-06-04 RX ORDER — CLOPIDOGREL BISULFATE 75 MG/1
75 TABLET, FILM COATED ORAL DAILY
Refills: 0 | Status: DISCONTINUED | OUTPATIENT
Start: 2025-06-05 | End: 2025-06-06

## 2025-06-04 RX ORDER — IBUPROFEN 600 MG/1
600 TABLET, FILM COATED ORAL 3 TIMES DAILY
Qty: 90 | Refills: 2 | Status: ACTIVE | COMMUNITY

## 2025-06-04 RX ORDER — ACETAMINOPHEN 500 MG/5ML
650 LIQUID (ML) ORAL ONCE
Refills: 0 | Status: COMPLETED | OUTPATIENT
Start: 2025-06-04 | End: 2025-06-04

## 2025-06-04 RX ORDER — METOCLOPRAMIDE HCL 10 MG
10 TABLET ORAL ONCE
Refills: 0 | Status: COMPLETED | OUTPATIENT
Start: 2025-06-04 | End: 2025-06-04

## 2025-06-04 RX ORDER — CLOPIDOGREL BISULFATE 75 MG/1
75 TABLET, FILM COATED ORAL ONCE
Refills: 0 | Status: COMPLETED | OUTPATIENT
Start: 2025-06-04 | End: 2025-06-04

## 2025-06-04 RX ORDER — ACETAMINOPHEN 500 MG/1
500 TABLET ORAL EVERY 6 HOURS
Qty: 240 | Refills: 2 | Status: ACTIVE | COMMUNITY

## 2025-06-04 RX ORDER — ASPIRIN 325 MG
81 TABLET ORAL DAILY
Refills: 0 | Status: DISCONTINUED | OUTPATIENT
Start: 2025-06-05 | End: 2025-06-06

## 2025-06-04 RX ORDER — METOCLOPRAMIDE 5 MG/1
5 TABLET ORAL 3 TIMES DAILY
Qty: 60 | Refills: 5 | Status: ACTIVE | COMMUNITY

## 2025-06-04 RX ORDER — ASPIRIN 325 MG
81 TABLET ORAL ONCE
Refills: 0 | Status: COMPLETED | OUTPATIENT
Start: 2025-06-04 | End: 2025-06-04

## 2025-06-04 RX ORDER — ATORVASTATIN CALCIUM 80 MG/1
80 TABLET, FILM COATED ORAL AT BEDTIME
Refills: 0 | Status: DISCONTINUED | OUTPATIENT
Start: 2025-06-04 | End: 2025-06-06

## 2025-06-04 RX ORDER — ENOXAPARIN SODIUM 100 MG/ML
40 INJECTION SUBCUTANEOUS EVERY 24 HOURS
Refills: 0 | Status: DISCONTINUED | OUTPATIENT
Start: 2025-06-04 | End: 2025-06-06

## 2025-06-04 RX ADMIN — Medication 650 MILLIGRAM(S): at 12:35

## 2025-06-04 RX ADMIN — ATORVASTATIN CALCIUM 80 MILLIGRAM(S): 80 TABLET, FILM COATED ORAL at 21:47

## 2025-06-04 RX ADMIN — Medication 1000 MILLILITER(S): at 12:35

## 2025-06-04 RX ADMIN — ENOXAPARIN SODIUM 40 MILLIGRAM(S): 100 INJECTION SUBCUTANEOUS at 21:47

## 2025-06-04 RX ADMIN — Medication 104 MILLIGRAM(S): at 12:36

## 2025-06-04 RX ADMIN — CLOPIDOGREL BISULFATE 75 MILLIGRAM(S): 75 TABLET, FILM COATED ORAL at 16:41

## 2025-06-04 RX ADMIN — Medication 81 MILLIGRAM(S): at 16:41

## 2025-06-04 NOTE — ED PROVIDER NOTE - PSYCHIATRIC, MLM
DATE OF SERVICE: 04-15-23 @ 10:48    Patient is a 73y old  Female who presents with a chief complaint of Sent in from [facility] for worsening RIGHT hip pain (15 Apr 2023 09:16)      INTERVAL HISTORY: no complaints     REVIEW OF SYSTEMS:  CONSTITUTIONAL: No weakness  EYES/ENT: No visual changes;  No throat pain   NECK: No pain or stiffness  RESPIRATORY: No cough, wheezing; No shortness of breath  CARDIOVASCULAR: No chest pain or palpitations  GASTROINTESTINAL: No abdominal  pain. No nausea, vomiting, or hematemesis  GENITOURINARY: No dysuria, frequency or hematuria  NEUROLOGICAL: No stroke like symptoms  SKIN: No rashes    	  MEDICATIONS:  furosemide   Injectable 20 milliGRAM(s) IV Push daily  hydrALAZINE 100 milliGRAM(s) Oral every 8 hours  losartan 100 milliGRAM(s) Oral daily  NIFEdipine XL 60 milliGRAM(s) Oral daily        PHYSICAL EXAM:  T(C): 36.7 (04-15-23 @ 08:06), Max: 36.7 (04-15-23 @ 00:53)  HR: 81 (04-15-23 @ 08:06) (77 - 82)  BP: 119/60 (04-15-23 @ 08:06) (119/60 - 138/75)  RR: 18 (04-15-23 @ 08:06) (18 - 20)  SpO2: 95% (04-15-23 @ 08:06) (93% - 95%)  Wt(kg): --  I&O's Summary    14 Apr 2023 07:01  -  15 Apr 2023 07:00  --------------------------------------------------------  IN: 720 mL / OUT: 2300 mL / NET: -1580 mL          Appearance: In no distress	  HEENT:    PERRL, EOMI	  Cardiovascular:  S1 S2, No JVD  Respiratory: Lungs clear to auscultation	  Gastrointestinal:  Soft, Non-tender, + BS	  Vascularature:  No edema of LE  Psychiatric: Appropriate affect   Neuro: no acute focal deficits           04-15    137  |  100  |  40<H>  ----------------------------<  100<H>  4.8   |  31  |  0.93    Ca    8.8      15 Apr 2023 07:18  Phos  5.0     04-15  Mg     2.0     04-15          Labs personally reviewed      ASSESSMENT/PLAN: 	    74 y/o F--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 Patient denies fever, chills, rigors.  No chest pain/pressure.  NO palpitations.   Patient denies dyspnoea but with poor exercise capacity.     1. MRSA bacteremia  - Blood Cx 4/11 NGTD  - Afebrile, no leukocytosis  - TRUDY if Cultures repeat + and if ID recommends  - TTE shows preserved EF, no evidence of vegetations    2. Chronic Diastolic Heart Failure  - CXR shows small left pleural effusion  - BNP 3206  - TTE shows preserved EF, no WMA, moderate AS  - c/w Lasix 20mg IVP daily, approaching euvolemia  - Likely transition to PO within the next 48-72 hours  - Can transition to PO Lasix tomorrow     3. Hypertension  - c/w losartan 100mg daily  - c/w hydralazine 100mg PO TID  - c/w Nifedipine 60mg PO daily    4. DVT ppx  - c/w SQ Heparin          KELLEY Trotter DO [facility]  Cardiovascular Medicine  [address] [place] [address], [address]  Office: [phone]  Available via call/text on Microsoft Teams  DATE OF SERVICE: 04-15-23 @ 10:48    Patient is a 73y old  Female who presents with a chief complaint of Sent in from [facility] for worsening RIGHT hip pain (15 Apr 2023 09:16)      INTERVAL HISTORY: no complaints     REVIEW OF SYSTEMS:  CONSTITUTIONAL: No weakness  EYES/ENT: No visual changes;  No throat pain   NECK: No pain or stiffness  RESPIRATORY: No cough, wheezing; No shortness of breath  CARDIOVASCULAR: No chest pain or palpitations  GASTROINTESTINAL: No abdominal  pain. No nausea, vomiting, or hematemesis  GENITOURINARY: No dysuria, frequency or hematuria  NEUROLOGICAL: No stroke like symptoms  SKIN: No rashes    	  MEDICATIONS:  furosemide   Injectable 20 milliGRAM(s) IV Push daily  hydrALAZINE 100 milliGRAM(s) Oral every 8 hours  losartan 100 milliGRAM(s) Oral daily  NIFEdipine XL 60 milliGRAM(s) Oral daily        PHYSICAL EXAM:  T(C): 36.7 (04-15-23 @ 08:06), Max: 36.7 (04-15-23 @ 00:53)  HR: 81 (04-15-23 @ 08:06) (77 - 82)  BP: 119/60 (04-15-23 @ 08:06) (119/60 - 138/75)  RR: 18 (04-15-23 @ 08:06) (18 - 20)  SpO2: 95% (04-15-23 @ 08:06) (93% - 95%)  Wt(kg): --  I&O's Summary    14 Apr 2023 07:01  -  15 Apr 2023 07:00  --------------------------------------------------------  IN: 720 mL / OUT: 2300 mL / NET: -1580 mL          Appearance: In no distress	  HEENT:    PERRL, EOMI	  Cardiovascular:  S1 S2, No JVD  Respiratory: Lungs clear to auscultation	  Gastrointestinal:  Soft, Non-tender, + BS	  Vascularature:  No edema of LE  Psychiatric: Appropriate affect   Neuro: no acute focal deficits           04-15    137  |  100  |  40<H>  ----------------------------<  100<H>  4.8   |  31  |  0.93    Ca    8.8      15 Apr 2023 07:18  Phos  5.0     04-15  Mg     2.0     04-15          Labs personally reviewed      ASSESSMENT/PLAN: 	    72 y/o F--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 Patient denies fever, chills, rigors.  No chest pain/pressure.  NO palpitations.   Patient denies dyspnoea but with poor exercise capacity.     1. MRSA bacteremia  - Blood Cx 4/11 NGTD  - Afebrile, no leukocytosis  - TRUDY if Cultures repeat + and if ID recommends  - TTE shows preserved EF, no evidence of vegetations    2. Chronic Diastolic Heart Failure  - CXR shows small left pleural effusion  - BNP 3206  - TTE shows preserved EF, no WMA, moderate AS  - c/w Lasix 20mg IVP daily, approaching euvolemia  - Likely transition to PO within the next 48-72 hours  - Can transition to PO Lasix today    3. Hypertension  - c/w losartan 100mg daily  - c/w hydralazine 100mg PO TID  - c/w Nifedipine 60mg PO daily    4. DVT ppx  - c/w SQ Heparin          KELLEY Trotter DO [facility]  Cardiovascular Medicine  [address] [place] [address], [address]  Office: [phone]  Available via call/text on Microsoft Teams  Alert and oriented to person, place, time/situation. normal mood and affect. no apparent risk to self or others.

## 2025-06-04 NOTE — ED ADULT NURSE NOTE - OBJECTIVE STATEMENT
79 yo F, PMHx pancreatitis, gallstones, presents to the ED co blurry vision since waking up on Friday. Pt awake and alert, AOx4, Pt reports she woke up on Friday and had a Sharp pain in her L eye and states " I can't see very well. When I am seeing with my eyes it is not registering with my brain. I see black spots periodically and I Can't read print well." Pt denies new sx since Friday. Pt reports she went to  Friday morning and they sent his to [facility]. Pt states, "they treated my headache, but they did not scan my head and I kept telling them I could not see." Pt reports then on Monday she went to an ophthalmologist who told her to go back to an ED to get an MRI. Pt reports L hand numbness since Friday morning. Pt denies dizziness. No CP, SOB, n/v/d, f/c. No falls / head traumas. No weakness.

## 2025-06-04 NOTE — H&P ADULT - NS ATTEND AMEND GEN_ALL_CORE FT
Initial Patient Evaluation, DOS Wed 6/4/2025    I performed: review of objective data, interview of patient, and discussion of assessment and plan with patient/family/multidisciplinary team. I agree with ACP/ resident and/or fellow documentation except where indicated.

## 2025-06-04 NOTE — H&P ADULT - NSHPPHYSICALEXAM_GEN_ALL_CORE
Neurologic:  -Mental status: Awake, alert, oriented to person, place, and time. Speech is fluent with intact naming, repetition, and comprehension, no dysarthria. Recent and remote memory intact. Follows commands. Attention/concentration intact.    -Cranial nerves:   II: Inconsistent VF exam. Originally had difficulty with seeing fingers from L VF of both eyes, on re-examination, R eye VFF  III, IV, VI: Extraocular movements are intact without nystagmus. Pupils equally round and reactive to light  V:  Facial sensation V1-V3 equal and intact   VII: subtle L NLFF  VIII: Hearing is bilaterally intact to voice  Motor: Normal bulk and tone. No pronator drift. Strength bilateral upper extremity 5/5, bilateral lower extremities 5/5.  Sensation: Intact to light touch bilaterally. No neglect or extinction on double simultaneous testing.  Coordination: No dysmetria on finger-to-nose bilaterally  Gait: deferred    NIHSS Score: 2 Neurologic:  -Mental status: Awake, alert, oriented to person, place, and time. Speech is fluent with intact naming, repetition, and comprehension, no dysarthria. Recent and remote memory intact. Follows commands. Attention/concentration intact.    -Cranial nerves:   II: Inconsistent VF exam. Originally had difficulty with seeing fingers from LUQ, LLQ of both eyes, on re-examination, R eye VFF  III, IV, VI: Extraocular movements are intact without nystagmus. Pupils equally round and reactive to light  V:  Facial sensation V1-V3 equal and intact   VII: subtle L NLFF  VIII: Hearing is bilaterally intact to voice  Motor: Normal bulk and tone. No pronator drift. Strength bilateral upper extremity 5/5, bilateral lower extremities 5/5.  Sensation: Intact to light touch bilaterally. No neglect or extinction on double simultaneous testing.  Coordination: No dysmetria on finger-to-nose bilaterally  Gait: deferred    NIHSS Score: 2

## 2025-06-04 NOTE — ED ADULT NURSE NOTE - NS ED NURSE LEVEL OF CONSCIOUSNESS AFFECT
· Likely in setting of renal disease, trending down since admit and now <7  · S/P 1u PRBCs 9/11 and will likely end up needing platelet transfusion, currently 28  · Continue to monitor closely Calm

## 2025-06-04 NOTE — H&P ADULT - ASSESSMENT
80y Female with PMHx of gallstone pancreatitis 2022 presents to ED with visual disturbance in L visual fields bilaterally x 5 days. 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 NIHSS 2. Admitted for stroke w/u.    Neuro  #CVA workup  - continue aspirin 81mg and plavix 75mg daily  - continue atorvastatin 80mg daily  - q4hr stroke neuro checks and vitals  - no need for MRI as stroke is seen on CTH  - provided stroke education about signs and symptoms    Cards  - gradual normotension as pt is 5 days out of sx onset  - gradual normotension  - obtain TTE, TRAMAINE, ILR    #HLD  - high dose statin as above in CVA  - LDL results: 141    Pulm  - call provider if SPO2 < 94%    GI  #Nutrition/Fluids/Electrolytes   - replete K<4 and Mg <2  - Diet: reg, NPO after MN    Renal  - trend BMP    Infectious Disease  - ron    Endocrine  - A1C results: 5.3    - TSH results: pending    DVT Prophylaxis  - lovenox sq for DVT prophylaxis   - SCDs for DVT prophylaxis     Dispo: pending PT/OT     Discussed daily hospital plans and goals with patient and family at bedside.     Discussed with Neurology Attending Dr. Dan

## 2025-06-04 NOTE — ED ADULT NURSE NOTE - CHIEF COMPLAINT QUOTE
c/o blurry vision to both eyes since waking up 4 days ago. seen at [facility], dc'ed home. sent back to ED today by ophthalmologist for further eval.  denies any new/changed sx over past 24 hours or since sx onset 4 days ago. denies unilateral weakness, speech changes, dizziness. wearing corrective lenses (baseline).  pt in nad, a&ox4, ambulatory with steady gait. befast neg.

## 2025-06-04 NOTE — ED ADULT TRIAGE NOTE - CHIEF COMPLAINT QUOTE
c/o blurry vision to both eyes since waking up 4 days ago. seen at [place], dc'ed home. sent back to ED today by ophthalmologist for further eval.  denies any new/changed sx over past 24 hours or since sx onset 4 days ago. denies unilateral weakness, speech changes, dizziness. wearing corrective lenses (baseline).  pt in nad, a&ox4, ambulatory with steady gait. befast neg.

## 2025-06-04 NOTE — ED PROVIDER NOTE - PHYSICAL EXAMINATION
PERRL, EOMI, very slight R horizontal fatiguing nystagmus. CN intact. Strength 5/5. Steady unassisted gait. No pronator drift. Sensation grossly intact. Normal speech, no dysarthria. No temporal ttp, no nuchal rigidity. No carotid bruits.

## 2025-06-04 NOTE — PATIENT PROFILE ADULT - FALL HARM RISK - HARM RISK INTERVENTIONS
Assistance with ambulation/Assistance OOB with selected safe patient handling equipment/Communicate Risk of Fall with Harm to all staff/Discuss with provider need for PT consult/Monitor for mental status changes/Monitor gait and stability/Move patient closer to nurses' station/Provide patient with walking aids - walker, cane, crutches/Reinforce activity limits and safety measures with patient and family/Reorient to person, place and time as needed/Review medications for side effects contributing to fall risk/Sit up slowly, dangle for a short time, stand at bedside before walking/Tailored Fall Risk Interventions/Visual Cue: Yellow wristband and red socks/Bed in lowest position, wheels locked, appropriate side rails in place/Call bell, personal items and telephone in reach/Instruct patient to call for assistance before getting out of bed or chair/Non-slip footwear when patient is out of bed/[place] to call system/Physically safe environment - no spills, clutter or unnecessary equipment/Purposeful Proactive Rounding/Room/bathroom lighting operational, light cord in reach

## 2025-06-04 NOTE — ED PROVIDER NOTE - CLINICAL SUMMARY MEDICAL DECISION MAKING FREE TEXT BOX
80F PMH gallstone pancreatitis, denies other PMH p/w vision loss. States she awoke 5d ago w/ vision loss - mostly L upper vision, constant. Also has intermittent seeing spots - this lasts a split second then resolves, none currently. Also has tingling to L 3rd and 4th fingers since then. Also has bitemporal HA since then, intermittent, not worse in the morning. Went to UC and then outside ER, labs w/ no significant abnormalities - received meds and HA resolved and was dc'd. Followed up w/ neuro Dr. 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 No other systemic symptoms.   Mild HTN, other vitals wnl. Exam as above.   ddx: Neuro documented bitemporal hemianopia. Concern for CVA vs. vasculitis/temporal arteritis vs. mass vs. lower suspicion primary ophtho pathology.   Labs, CTs, IVF/attempt symptom control, stroke team consulted (already aware of pt). Will reassess.

## 2025-06-04 NOTE — H&P ADULT - NSHPLABSRESULTS_GEN_ALL_CORE
Fingerstick Blood Glucose: CAPILLARY BLOOD GLUCOSE        LABS:                        15.1   6.21  )-----------( 229      ( 04 Jun 2025 10:47 )             44.8     06-04    144  |  109[H]  |  17  ----------------------------<  101[H]  4.1   |  24  |  0.77    Ca    9.3      04 Jun 2025 10:47    TPro  6.9  /  Alb  4.2  /  TBili  0.4  /  DBili  x   /  AST  21  /  ALT  15  /  AlkPhos  60  06-04          Urinalysis Basic - ( 04 Jun 2025 10:47 )    Color: x / Appearance: x / SG: x / pH: x  Gluc: 101 mg/dL / Ketone: x  / Bili: x / Urobili: x   Blood: x / Protein: x / Nitrite: x   Leuk Esterase: x / RBC: x / WBC x   Sq Epi: x / Non Sq Epi: x / Bacteria: x        RADIOLOGY & ADDITIONAL STUDIES:    CT of the brain:  No acute intracranial injury  Right occipital chronic infarction.      CTA of the intracranial circulation.  Occlusion of the right PCA at the origin.  No evidence of aneurysm.  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  No evidence of carotid stenosis.

## 2025-06-04 NOTE — H&P ADULT - HISTORY OF PRESENT ILLNESS
**STROKE HPI***    HPI: 80y Female with PMHx of gallstone pancreatitis 2022 presents to ED with visual sx. Reports 5 days ago woke up with R sharp temporal headache and felt like her vision was impaired L visual field of both eyes. Felt like she could not focus. Went to ED Friday and was d/c with ocular migraine dx. Saw optho Monday and during vison exam patient saw some shapes on top of the letters and was suggested to present to hospital as ophthalmologist had c/f stroke. Saw an outpatient neurologist Dr. Wolf at [facility] who sent pt to [facility] ED for c/f stroke. Patient denies any weakness, numbness, gait changes.    PAST MEDICAL & SURGICAL HISTORY:  No pertinent past medical history          FAMILY HISTORY:      SOCIAL HISTORY:   Patient lives with *** at ***.   Smoking status:  Drinking:  Drug Use:         T(C): 36.4 (06-04-25 @ 17:04), Max: 36.6 (06-04-25 @ 10:30)  HR: 60 (06-04-25 @ 17:40) (58 - 88)  BP: 179/79 (06-04-25 @ 17:40) (168/79 - 179/79)  RR: 19 (06-04-25 @ 17:40) (16 - 19)  SpO2: 97% (06-04-25 @ 17:40) (97% - 97%)    MEDICATION RECONCILIATION   MEDICATIONS  (STANDING):  atorvastatin 80 milliGRAM(s) Oral at bedtime  enoxaparin Injectable 40 milliGRAM(s) SubCutaneous every 24 hours    MEDICATIONS  (PRN):    Allergies    No Known Allergies    Intolerances      Vital Signs Last 24 Hrs  T(C): 36.4 (04 Jun 2025 17:04), Max: 36.6 (04 Jun 2025 10:30)  T(F): 97.6 (04 Jun 2025 17:04), Max: 97.8 (04 Jun 2025 10:30)  HR: 60 (04 Jun 2025 17:40) (58 - 88)  BP: 179/79 (04 Jun 2025 17:40) (168/79 - 179/79)  BP(mean): 113 (04 Jun 2025 17:40) (113 - 113)  RR: 19 (04 Jun 2025 17:40) (16 - 19)  SpO2: 97% (04 Jun 2025 17:40) (97% - 97%)    Parameters below as of 04 Jun 2025 17:40  Patient On (Oxygen Delivery Method): room air      **STROKE HPI***    HPI: 80y Female with PMHx of gallstone pancreatitis 2022 presents to ED with visual sx. Reports 5 days ago woke up with R sharp temporal headache and felt like her vision was impaired LUQ, LLQ visual field of both eyes. Felt like she could not focus. Went to ED Friday and was d/c with ocular migraine dx. Saw optho Monday and during vison exam patient saw some shapes on top of the letters and was suggested to present to hospital as ophthalmologist had c/f stroke. Saw an outpatient neurologist Dr. Wolf at [facility] today who sent pt to [facility] ED for c/f stroke. Patient denies any weakness, numbness, gait changes.    PAST MEDICAL & SURGICAL HISTORY:  No pertinent past medical history          FAMILY HISTORY:      T(C): 36.4 (06-04-25 @ 17:04), Max: 36.6 (06-04-25 @ 10:30)  HR: 60 (06-04-25 @ 17:40) (58 - 88)  BP: 179/79 (06-04-25 @ 17:40) (168/79 - 179/79)  RR: 19 (06-04-25 @ 17:40) (16 - 19)  SpO2: 97% (06-04-25 @ 17:40) (97% - 97%)    MEDICATION RECONCILIATION   MEDICATIONS  (STANDING):  atorvastatin 80 milliGRAM(s) Oral at bedtime  enoxaparin Injectable 40 milliGRAM(s) SubCutaneous every 24 hours    MEDICATIONS  (PRN):    Allergies    No Known Allergies    Intolerances      Vital Signs Last 24 Hrs  T(C): 36.4 (04 Jun 2025 17:04), Max: 36.6 (04 Jun 2025 10:30)  T(F): 97.6 (04 Jun 2025 17:04), Max: 97.8 (04 Jun 2025 10:30)  HR: 60 (04 Jun 2025 17:40) (58 - 88)  BP: 179/79 (04 Jun 2025 17:40) (168/79 - 179/79)  BP(mean): 113 (04 Jun 2025 17:40) (113 - 113)  RR: 19 (04 Jun 2025 17:40) (16 - 19)  SpO2: 97% (04 Jun 2025 17:40) (97% - 97%)    Parameters below as of 04 Jun 2025 17:40  Patient On (Oxygen Delivery Method): room air

## 2025-06-04 NOTE — ED PROVIDER NOTE - PROGRESS NOTE DETAILS
[place]: Mildly abnormal lipid panel, other labs including ESR/CRP wnl.   CTs showed "IMPRESSION: CT of the brain: No acute intracranial injury Right occipital chronic infarction.  CTA of the intracranial circulation. Occlusion of the right PCA at the origin. No evidence of aneurysm.  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 No evidence of carotid stenosis."  Pt remains stable. Stroke recs pending. Will reassess. Sincere: Rediscussed w/ stroke team, will admit for further care.  Updated Dr. Wolf.   Stroke team updated ptKelly

## 2025-06-04 NOTE — H&P ADULT - NIH STROKE SCALE: 2. BEST GAZE, QM
Abdomen:     Soft, non-tender, bowel sounds active all four quadrants,     no masses, no organomegaly   Genitalia:    Normal female without lesion, discharge or tenderness   Rectal:    Normal tone ;guaiac negative stool   Extremities:   Extremities normal, atraumatic, no cyanosis or edema   Pulses:   2+ and symmetric all extremities   Skin:   Skin color, texture, turgor normal, no rashes or lesions   Lymph nodes:   Cervical, supraclavicular, and axillary nodes normal   Neurologic:   CNII-XII intact, normal strength, sensation and reflexes     throughout       Disposition: home    In process/preliminary results:  Outstanding Order Results     No orders found from 6/24/2018 to 7/24/2018. Patient Instructions:   Discharge Medication List as of 7/27/2018 11:25 AM      START taking these medications    Details   NIFEdipine (ADALAT CC) 30 MG extended release tablet Take 1 tablet by mouth daily, Disp-30 tablet, R-3Normal      ammonium lactate (LAC-HYDRIN) 12 % lotion Apply topically as needed. , Disp-1 Bottle, R-0, Normal      chlorthalidone (HYGROTON) 25 MG tablet Take 0.5 tablets by mouth daily, Disp-30 tablet, R-3Normal         CONTINUE these medications which have CHANGED    Details   carvedilol (COREG) 12.5 MG tablet Take 1 tablet by mouth 2 times daily (with meals), Disp-60 tablet, R-3Normal         CONTINUE these medications which have NOT CHANGED    Details   irbesartan (AVAPRO) 300 MG tablet Take 1 tablet by mouth nightly, Disp-30 tablet, R-0PLEASE CANCEL EDARBI SCRIPT. Normal      polyethylene glycol (MIRALAX) powder Take 17 g daily for the next 10 days. Decreased to one half capful once daily once having soft stools. , Disp-1 Bottle, R-0Print      aspirin 325 MG tablet Take 325 mg by mouth daily Historical Med         STOP taking these medications       cloNIDine (CATAPRES) 0.2 MG tablet Comments:   Reason for Stopping:             Activity: activity as tolerated  Diet: regular diet  Wound Care: none
(0) Normal

## 2025-06-04 NOTE — ED PROVIDER NOTE - OBJECTIVE STATEMENT
80F PMH gallstone pancreatitis, denies other PMH p/w vision loss. States she awoke 5d ago w/ vision loss - mostly L upper vision, constant. Also has intermittent seeing spots - this lasts a split second then resolves, none currently. Also has tingling to L 3rd and 4th fingers since then. Also has bitemporal HA since then, intermittent, not worse in the morning. Went to UC and then outside ER, labs w/ no significant abnormalities - received meds and HA resolved and was dc'd. Followed up w/ neuro Dr. 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 No other systemic symptoms.   Denies eye pain, diplopia,  focal weakness, other numbness, vertigo, neck pain, rashes, tinnitus, hearing changes, URI symptoms, f/c, SOB/CP, NVD, abd pain, urinary complaints, black/bloody stool, lifestyle/dietary/medication changes.

## 2025-06-05 ENCOUNTER — TRANSCRIPTION ENCOUNTER (OUTPATIENT)
Age: 80
End: 2025-06-05

## 2025-06-05 DIAGNOSIS — H53.9 UNSPECIFIED VISUAL DISTURBANCE: ICD-10-CM

## 2025-06-05 DIAGNOSIS — I63.531 CEREBRAL INFARCTION DUE TO UNSPECIFIED OCCLUSION OR STENOSIS OF RIGHT POSTERIOR CEREBRAL ARTERY: ICD-10-CM

## 2025-06-05 LAB
ANION GAP SERPL CALC-SCNC: 10 MMOL/L — SIGNIFICANT CHANGE UP (ref 5–17)
BUN SERPL-MCNC: 16 MG/DL — SIGNIFICANT CHANGE UP (ref 7–23)
CALCIUM SERPL-MCNC: 8.9 MG/DL — SIGNIFICANT CHANGE UP (ref 8.4–10.5)
CHLORIDE SERPL-SCNC: 107 MMOL/L — SIGNIFICANT CHANGE UP (ref 96–108)
CO2 SERPL-SCNC: 25 MMOL/L — SIGNIFICANT CHANGE UP (ref 22–31)
CREAT SERPL-MCNC: 0.77 MG/DL — SIGNIFICANT CHANGE UP (ref 0.5–1.3)
EGFR: 77 ML/MIN/1.73M2 — SIGNIFICANT CHANGE UP
EGFR: 77 ML/MIN/1.73M2 — SIGNIFICANT CHANGE UP
GLUCOSE SERPL-MCNC: 97 MG/DL — SIGNIFICANT CHANGE UP (ref 70–99)
HCT VFR BLD CALC: 41.9 % — SIGNIFICANT CHANGE UP (ref 34.5–45)
HGB BLD-MCNC: 14.2 G/DL — SIGNIFICANT CHANGE UP (ref 11.5–15.5)
MAGNESIUM SERPL-MCNC: 2.1 MG/DL — SIGNIFICANT CHANGE UP (ref 1.6–2.6)
MCHC RBC-ENTMCNC: 29.6 PG — SIGNIFICANT CHANGE UP (ref 27–34)
MCHC RBC-ENTMCNC: 33.9 G/DL — SIGNIFICANT CHANGE UP (ref 32–36)
MCV RBC AUTO: 87.5 FL — SIGNIFICANT CHANGE UP (ref 80–100)
NRBC BLD AUTO-RTO: 0 /100 WBCS — SIGNIFICANT CHANGE UP (ref 0–0)
PHOSPHATE SERPL-MCNC: 3.7 MG/DL — SIGNIFICANT CHANGE UP (ref 2.5–4.5)
PLATELET # BLD AUTO: 215 K/UL — SIGNIFICANT CHANGE UP (ref 150–400)
POTASSIUM SERPL-MCNC: 3.8 MMOL/L — SIGNIFICANT CHANGE UP (ref 3.5–5.3)
POTASSIUM SERPL-SCNC: 3.8 MMOL/L — SIGNIFICANT CHANGE UP (ref 3.5–5.3)
RBC # BLD: 4.79 M/UL — SIGNIFICANT CHANGE UP (ref 3.8–5.2)
RBC # FLD: 12.9 % — SIGNIFICANT CHANGE UP (ref 10.3–14.5)
SODIUM SERPL-SCNC: 142 MMOL/L — SIGNIFICANT CHANGE UP (ref 135–145)
TSH SERPL-MCNC: 4.2 UIU/ML — SIGNIFICANT CHANGE UP (ref 0.27–4.2)
WBC # BLD: 4.84 K/UL — SIGNIFICANT CHANGE UP (ref 3.8–10.5)
WBC # FLD AUTO: 4.84 K/UL — SIGNIFICANT CHANGE UP (ref 3.8–10.5)

## 2025-06-05 PROCEDURE — 99222 1ST HOSP IP/OBS MODERATE 55: CPT

## 2025-06-05 PROCEDURE — 99233 SBSQ HOSP IP/OBS HIGH 50: CPT

## 2025-06-05 RX ORDER — ACETAMINOPHEN 500 MG/5ML
650 LIQUID (ML) ORAL EVERY 6 HOURS
Refills: 0 | Status: DISCONTINUED | OUTPATIENT
Start: 2025-06-05 | End: 2025-06-06

## 2025-06-05 RX ADMIN — Medication 650 MILLIGRAM(S): at 07:13

## 2025-06-05 RX ADMIN — Medication 650 MILLIGRAM(S): at 07:43

## 2025-06-05 RX ADMIN — ATORVASTATIN CALCIUM 80 MILLIGRAM(S): 80 TABLET, FILM COATED ORAL at 21:25

## 2025-06-05 RX ADMIN — Medication 20 MILLIEQUIVALENT(S): at 09:24

## 2025-06-05 RX ADMIN — ENOXAPARIN SODIUM 40 MILLIGRAM(S): 100 INJECTION SUBCUTANEOUS at 21:25

## 2025-06-05 NOTE — DISCHARGE NOTE PROVIDER - PROVIDER TOKENS
PROVIDER:[TOKEN:[[id number]],FOLLOWUP:[2 weeks]] PROVIDER:[TOKEN:[[id number]:[2 weeks]],PROVIDER:[TOKEN:[[id number]],FOLLOWUP:[2 weeks]] PROVIDER:[TOKEN:[[id number]],FOLLOWUP:[2 weeks]],PROVIDER:[TOKEN:[[id number]],FOLLOWUP:[2 weeks]],PROVIDER:[TOKEN:[[id number]],FOLLOWUP:[1 month]]

## 2025-06-05 NOTE — DISCHARGE NOTE PROVIDER - NSDCMRMEDTOKEN_GEN_ALL_CORE_FT
aspirin 81 mg oral delayed release tablet: 1 tab(s) orally once a day  atorvastatin 80 mg oral tablet: 1 tab(s) orally once a day (at bedtime)  clopidogrel 75 mg oral tablet: 1 tab(s) orally once a day

## 2025-06-05 NOTE — OCCUPATIONAL THERAPY INITIAL EVALUATION ADULT - MD ORDER
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

## 2025-06-05 NOTE — CONSULT NOTE ADULT - ASSESSMENT
Neurology    80y Female with PMHx of gallstone pancreatitis 2022 presents to ED with visual disturbance in L visual fields bilaterally x 5 days. 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 NIHSS 2. Admitted for stroke w/u.    Neuro  #CVA workup  - continue aspirin 81mg and plavix 75mg daily  - continue atorvastatin 80mg daily  - q4hr stroke neuro checks and vitals  - no need for MRI as stroke is seen on CTH  - provided stroke education about signs and symptoms    Cards  - gradual normotension as pt is 5 days out of sx onset  - gradual normotension  - obtain TTE, TRAMAINE, ILR    #HLD  - high dose statin as above in CVA  - LDL results: 141    Pulm  - call provider if SPO2 < 94%    GI  #Nutrition/Fluids/Electrolytes   - replete K<4 and Mg <2  - Diet: reg, NPO after MN    Renal  - trend BMP    Infectious Disease  - ron    Endocrine  - A1C results: 5.3    - TSH results: pending    DVT Prophylaxis  - lovenox sq for DVT prophylaxis   - SCDs for DVT prophylaxis     Dispo: pending PT/OT     Discussed daily hospital plans and goals with patient and family at bedside.     Discussed with Neurology Attending Dr. Dan    
80F with history of gallstone pancreatitis (2022), presented for several days of R eye pain and changes in vision, admitted for work up R PCA stroke.    #CVA work up  #PCA stroke  #HLD  - CTH: R chronic occipital infarct  - CTA: R PCA occlusion, mid R PCA severe stenosis/occlusion  - bMRI deferred given e/o stroke on CT  - TTE/TRAMAINE, ILR pending  - LDL is 141, on high intensity statin  - on ASA/plavix  - pending PT/OT    DVT ppx: lovenox  Dispo: pending PT

## 2025-06-05 NOTE — DISCHARGE NOTE PROVIDER - HOSPITAL COURSE
Hospital course:  80y Female with PMHx of gallstone pancreatitis 2022 presents to ED with visual disturbance in L visual fields bilaterally x 5 days. 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 NIHSS 2.  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 Speech is fluent with intact naming, repetition, and comprehension, no dysarthria. Recent and remote memory intact. Follows commands. Attention/concentration intact.    -Cranial nerves:   II: +LHH  III, IV, VI: Extraocular movements are intact without nystagmus. Pupils equally round and reactive to light  V:  Facial sensation V1-V3 equal and intact   VII: subtle L NLFF  VIII: Hearing is bilaterally intact to voice  Motor: Normal bulk and tone. No pronator drift. Strength bilateral upper extremity 5/5, bilateral lower extremities 5/5.  Sensation: Intact to light touch bilaterally. No neglect or extinction on double simultaneous testing.  Coordination: No dysmetria on finger-to-nose bilaterally  Gait: deferred    New medications on discharge: aspirin 81mg, plavix 75, atorvastatin 80mg  Labs to be followed up:  Imaging to be done as outpatient:  Further outpatient workup: stroke, EP   Hospital course:  80y Female with PMHx of gallstone pancreatitis 2022 presents to ED with visual disturbance in L visual fields bilaterally x 5 days. 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 NIHSS 2. TTE *** Cardiac CT neg. ILR placed 6/6.    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 Speech is fluent with intact naming, repetition, and comprehension, no dysarthria. Recent and remote memory intact. Follows commands. Attention/concentration intact.    -Cranial nerves:   II: +LHH  III, IV, VI: Extraocular movements are intact without nystagmus. Pupils equally round and reactive to light  V:  Facial sensation V1-V3 equal and intact   VII: subtle L NLFF  VIII: Hearing is bilaterally intact to voice  Motor: Normal bulk and tone. No pronator drift. Strength bilateral upper extremity 5/5, bilateral lower extremities 5/5.  Sensation: Intact to light touch bilaterally. No neglect or extinction on double simultaneous testing.  Coordination: No dysmetria on finger-to-nose bilaterally  Gait: deferred    New medications on discharge: aspirin 81mg, plavix 75, atorvastatin 80mg  Further outpatient workup: stroke, EP, neuro opthalmology   Hospital course:  80y Female with PMHx of gallstone pancreatitis 2022 presents to ED with visual disturbance in L visual fields bilaterally x 5 days. 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 NIHSS 2. TTE EF 69%. Cardiac CT neg. ILR placed 6/6.    During this hospital course, patient had a R PCA stroke.  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 Speech is fluent with intact naming, repetition, and comprehension, no dysarthria. Recent and remote memory intact. Follows commands. Attention/concentration intact.    -Cranial nerves:   II: +LHH  III, IV, VI: Extraocular movements are intact without nystagmus. Pupils equally round and reactive to light  V:  Facial sensation V1-V3 equal and intact   VII: subtle L NLFF  VIII: Hearing is bilaterally intact to voice  Motor: Normal bulk and tone. No pronator drift. Strength bilateral upper extremity 5/5, bilateral lower extremities 5/5.  Sensation: Intact to light touch bilaterally. No neglect or extinction on double simultaneous testing.  Coordination: No dysmetria on finger-to-nose bilaterally  Gait: deferred    New medications on discharge: aspirin 81mg, plavix 75, atorvastatin 80mg  Further outpatient workup: stroke, EP, neuro opthalmology

## 2025-06-05 NOTE — PHYSICAL THERAPY INITIAL EVALUATION ADULT - ADDITIONAL COMMENTS
Pt lives alone in an apt with elevator, 1 step enter. Prior to admission, pt ambulated independently without AD. Pt has a rollator at home. pt is R hand dominant, wear glasses all the time.m

## 2025-06-05 NOTE — PHYSICAL THERAPY INITIAL EVALUATION ADULT - PERTINENT HX OF CURRENT PROBLEM, REHAB EVAL
Pt is an 79 yo female with PMHx of gallstone pancreatitis 2022 presents to ED with visual disturbance in L visual fields bilaterally x 5 days. 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 NIHSS 2. Admitted for stroke w/u.

## 2025-06-05 NOTE — OCCUPATIONAL THERAPY INITIAL EVALUATION ADULT - PERTINENT HX OF CURRENT PROBLEM, REHAB EVAL
80y R handed Female with PMHx of gallstone pancreatitis 2022 presents to ED with visual sx. Reports 5 days ago woke up with R sharp temporal headache and felt like her vision was impaired LUQ, LLQ visual field of both eyes. Felt like she could not focus. Went to ED Friday and was d/c with ocular migraine dx. Saw optho Monday and during vison exam patient saw some shapes on top of the letters and was suggested to present to hospital as ophthalmologist had c/f stroke. Saw an outpatient neurologist Dr. Wolf at [facility] today who sent pt to [facility] ED for c/f stroke.

## 2025-06-05 NOTE — DISCHARGE NOTE PROVIDER - NSDCFUADDAPPT_GEN_ALL_CORE_FT
neuro optho You will receive a call from the neurology office to set up an outpatient follow up appointment. If you do not receive a call within one week of discharge, please call [phone] and ask to make an appointment with a provider from the stroke team. Continue aspirin and plavix for 21 days then aspirin alone.     Follow up with your PCP within 2 weeks of discharge.    EP ***     You will receive a call from the neurology office to set up an outpatient follow up appointment. If you do not receive a call within one week of discharge, please call [phone] and ask to make an appointment with a provider from the stroke team. Continue aspirin and plavix for 21 days then aspirin alone.     Follow up with your PCP within 2 weeks of discharge.    Follow up with EP regarding your loop recorder.

## 2025-06-05 NOTE — PHYSICAL THERAPY INITIAL EVALUATION ADULT - GAIT DEVIATIONS NOTED, PT EVAL
fairly steady gait, no lose of balance, decreased heel strike and hip flexion bilaterally./decreased richie/increased time in double stance/decreased stride length

## 2025-06-05 NOTE — OCCUPATIONAL THERAPY INITIAL EVALUATION ADULT - ADDITIONAL COMMENTS
Pt lives alone in apt w/ elevator access. Pt states that she was independent and enjoys an active lifestyle prior to admission. Pt denies prior use of AEs/DMEs.

## 2025-06-05 NOTE — PHYSICAL THERAPY INITIAL EVALUATION ADULT - GENERAL OBSERVATIONS, REHAB EVAL
Pt received semi supine in bed with +hep-lock, +EKG, friend present. Pt left sitting at EOB, call bell in reach, +bed alarm, friends present, RN Alecia made aware.

## 2025-06-05 NOTE — DISCHARGE NOTE PROVIDER - NSDCCPCAREPLAN_GEN_ALL_CORE_FT
PRINCIPAL DISCHARGE DIAGNOSIS  Diagnosis: Acute cerebrovascular accident (CVA)  Assessment and Plan of Treatment: During this hospital admission, you had an ischemic stroke. During an ischemic stroke, blood stops flowing to part of your brain because of a blockage in the blood vessel. This can damage areas in the brain that control other parts of the body.  Please take your aspirin and plavix for blood thinning and Atorvastatin for cholesterol medication/blood vessel protection as prescribed to prevent further strokes. Do not skip doses and do not run low on your medication. If you run low on your medication, please contact your doctor.  You will follow up outpatient with the stroke Nurse Practitioner as scheduled below.  Doing your regular tasks may be difficult after you've had a stroke, but you can learn new ways to manage your daily activities. In fact, doing daily activities may help you to regain muscle strength. Be patient, give yourself time to adjust, and appreciate the progress you make. For example, when showering or bathing, test the water temperature with a hand or foot that was not affected by the stroke, use grab bars, a shower seat, a hand-held showerhead, etc. It is normal to feel fatigue after a stroke, while some days may be worse than others, you will continue to improve.  Call 911 right away if you have any of the following symptoms of another stroke:  B: Balance: Sudden: Dizziness, loss of balance, or a sense of falling, difficulty with coordinating movement  E: Eyes: Sudden double vision or trouble seeing in one or both eyes  F: Face: Sudden uneven face  A: Arms (Legs): Sudden weakness, tingling, or loss of feeling on one side of your face or body  S: Speech: Sudden trouble talking or slurred speech, sudden difficulty understanding others  T: Time: Please call 911 right away and go to the emergency room  •Sudden, severe headache  •Blackouts or seizures       PRINCIPAL DISCHARGE DIAGNOSIS  Diagnosis: Acute cerebrovascular accident (CVA)  Assessment and Plan of Treatment: During this hospital admission, you had an ischemic stroke. During an ischemic stroke, blood stops flowing to part of your brain because of a blockage in the blood vessel. This can damage areas in the brain that control other parts of the body.  Please take your aspirin and plavix for 21 days then aspirin alone for blood thinning and Atorvastatin for cholesterol medication/blood vessel protection as prescribed to prevent further strokes. Do not skip doses and do not run low on your medication. If you run low on your medication, please contact your doctor.  You will follow up outpatient with the stroke Nurse Practitioner as scheduled below.  Doing your regular tasks may be difficult after you've had a stroke, but you can learn new ways to manage your daily activities. In fact, doing daily activities may help you to regain muscle strength. Be patient, give yourself time to adjust, and appreciate the progress you make. For example, when showering or bathing, test the water temperature with a hand or foot that was not affected by the stroke, use grab bars, a shower seat, a hand-held showerhead, etc. It is normal to feel fatigue after a stroke, while some days may be worse than others, you will continue to improve.  Call 911 right away if you have any of the following symptoms of another stroke:  B: Balance: Sudden: Dizziness, loss of balance, or a sense of falling, difficulty with coordinating movement  E: Eyes: Sudden double vision or trouble seeing in one or both eyes  F: Face: Sudden uneven face  A: Arms (Legs): Sudden weakness, tingling, or loss of feeling on one side of your face or body  S: Speech: Sudden trouble talking or slurred speech, sudden difficulty understanding others  T: Time: Please call 911 right away and go to the emergency room  •Sudden, severe headache  •Blackouts or seizures

## 2025-06-05 NOTE — DISCHARGE NOTE PROVIDER - NSDCFUSCHEDAPPT_GEN_ALL_CORE_FT
Noa Zhao  [facility] Physician [facility]  NEUROLOGY [address] [facility]  Scheduled Appointment: 07/01/2025

## 2025-06-05 NOTE — DISCHARGE NOTE PROVIDER - NSDCCAREPROVSEEN_GEN_ALL_CORE_FT
Sy, Taylor Taylor Dan - Inpatient Progress Note, DOS Friday 6.6.2025  I performed: review of objective data, interview of patient, and discussion of assessment and plan with patient/family/multidisciplinary team. I agree with ACP/ resident and/or fellow documentation except where indicated.

## 2025-06-05 NOTE — DISCHARGE NOTE PROVIDER - CARE PROVIDER_API CALL
Miguel Felton NP in Family Health  [address] [address]  Phone: [phone]  Fax: [phone]  Follow Up Time: 2 weeks   Miguel Felton  NP in Family Health  [address] [address]  Phone: [phone]  Fax: [phone]  Follow Up Time: 2 weeks    Jesus Dozier  Ophthalmology  [address] [address]  Phone: [phone]  Fax: [phone]  Follow Up Time: 2 weeks   Miguel Felton  NP in Family Health  [address] [address]  Phone: [phone]  Fax: [phone]  Follow Up Time: 2 weeks    Jesus Dozier  Ophthalmology  [address] [address]  Phone: [phone]  Fax: [phone]  Follow Up Time: 2 weeks    Dusty Sosa  Cardiac Electrophysiology  [address] [address]  Phone: [phone]  Fax: [phone]  Follow Up Time: 1 month

## 2025-06-05 NOTE — OCCUPATIONAL THERAPY INITIAL EVALUATION ADULT - MANUAL MUSCLE TESTING RESULTS, REHAB EVAL
66yo male pt with PMHx of HLD, Kidney stonex2 (last one was 7years ago) presents to ED with right flank pain and diagnosed right mid ureter stone (5mm). Pt stated he felt sudden sharp right flank pain 2days ago and evaluated in ED at [facility]. He is on Percocet/ Ibuprofen but noticed worsening pain today. He spoked to his urologist Dr. Hoenig and recommended to go to ED. He noticed radiating pain to lower abdomen, nausea and chills with severe pain and the pain is mild after taking Percocet at 11am (took Flomax last night and Ibuprofen at 6am). Denies fever, cough, congestion or sore throat. Denies CP/SOB. Denies urinary problems. Denies sensory changes or weakness to extremities..
no strength deficits were identified

## 2025-06-05 NOTE — OCCUPATIONAL THERAPY INITIAL EVALUATION ADULT - GENERAL OBSERVATIONS, REHAB EVAL
Pt's RN Alecia aware of intent to eval/tx; cleared Pt. Pt received in supine - +telemetry, heplock, bed alarm. Pt agreeable to OT.

## 2025-06-05 NOTE — PHYSICAL THERAPY INITIAL EVALUATION ADULT - MODALITIES TREATMENT COMMENTS
CN Testing: B/L Frontalis intact; B/L buccinator intact; smile symmetrical; tongue protrusion at midline; B/L eyes open/close intact; Shoulder elevation: intact bilaterally; Vision H-Test: bilateral tracking and smooth pursuit intact; Convergence/Divergence: intact; Vision Quadrant Test: decreased peripheral vision on L side on both eyes.

## 2025-06-05 NOTE — CONSULT NOTE ADULT - SUBJECTIVE AND OBJECTIVE BOX
Patient is a 80y old  Female who presents with a chief complaint of     HPI:   **STROKE HPI***    HPI: 80y Female with PMHx of gallstone pancreatitis 2022 presents to ED with visual sx. Reports 5 days ago woke up with R sharp temporal headache and felt like her vision was impaired LUQ, LLQ visual field of both eyes. Felt like she could not focus. Went to ED Friday and was d/c with ocular migraine dx. Saw optho Monday and during vison exam patient saw some shapes on top of the letters and was suggested to present to hospital as ophthalmologist had c/f stroke. Saw an outpatient neurologist Dr. Wolf at [facility] who sent pt to [facility] ED for c/f stroke. Patient denies any weakness, numbness, gait changes.    PAST MEDICAL & SURGICAL HISTORY:  No pertinent past medical history          FAMILY HISTORY:      T(C): 36.4 (06-04-25 @ 17:04), Max: 36.6 (06-04-25 @ 10:30)  HR: 60 (06-04-25 @ 17:40) (58 - 88)  BP: 179/79 (06-04-25 @ 17:40) (168/79 - 179/79)  RR: 19 (06-04-25 @ 17:40) (16 - 19)  SpO2: 97% (06-04-25 @ 17:40) (97% - 97%)    MEDICATION RECONCILIATION   MEDICATIONS  (STANDING):  atorvastatin 80 milliGRAM(s) Oral at bedtime  enoxaparin Injectable 40 milliGRAM(s) SubCutaneous every 24 hours    MEDICATIONS  (PRN):    Allergies    No Known Allergies    Intolerances      Vital Signs Last 24 Hrs  T(C): 36.4 (04 Jun 2025 17:04), Max: 36.6 (04 Jun 2025 10:30)  T(F): 97.6 (04 Jun 2025 17:04), Max: 97.8 (04 Jun 2025 10:30)  HR: 60 (04 Jun 2025 17:40) (58 - 88)  BP: 179/79 (04 Jun 2025 17:40) (168/79 - 179/79)  BP(mean): 113 (04 Jun 2025 17:40) (113 - 113)  RR: 19 (04 Jun 2025 17:40) (16 - 19)  SpO2: 97% (04 Jun 2025 17:40) (97% - 97%)    Parameters below as of 04 Jun 2025 17:40  Patient On (Oxygen Delivery Method): room air     (04 Jun 2025 18:27)    PAST MEDICAL & SURGICAL HISTORY:  No pertinent past medical history        MEDICATIONS  (STANDING):  aspirin enteric coated 81 milliGRAM(s) Oral daily  atorvastatin 80 milliGRAM(s) Oral at bedtime  clopidogrel Tablet 75 milliGRAM(s) Oral daily  enoxaparin Injectable 40 milliGRAM(s) SubCutaneous every 24 hours  potassium chloride    Tablet ER 20 milliEquivalent(s) Oral once    MEDICATIONS  (PRN):  acetaminophen     Tablet .. 650 milliGRAM(s) Oral every 6 hours PRN Temp greater or equal to 38C (100.4F), Mild Pain (1 - 3), Moderate Pain (4 - 6)      FAMILY HISTORY:      CBC Full  -  ( 05 Jun 2025 06:25 )  WBC Count : 4.84 K/uL  RBC Count : 4.79 M/uL  Hemoglobin : 14.2 g/dL  Hematocrit : 41.9 %  Platelet Count - Automated : 215 K/uL  Mean Cell Volume : 87.5 fl  Mean Cell Hemoglobin : 29.6 pg  Mean Cell Hemoglobin Concentration : 33.9 g/dL  Auto Neutrophil # : x  Auto Lymphocyte # : x  Auto Monocyte # : x  Auto Eosinophil # : x  Auto Basophil # : x  Auto Neutrophil % : x  Auto Lymphocyte % : x  Auto Monocyte % : x  Auto Eosinophil % : x  Auto Basophil % : x      06-05    142  |  107  |  16  ----------------------------<  97  3.8   |  25  |  0.77    Ca    8.9      05 Jun 2025 06:25  Phos  3.7     06-05  Mg     2.1     06-05    TPro  6.9  /  Alb  4.2  /  TBili  0.4  /  DBili  x   /  AST  21  /  ALT  15  /  AlkPhos  60  06-04      Urinalysis Basic - ( 05 Jun 2025 06:25 )    Color: x / Appearance: x / SG: x / pH: x  Gluc: 97 mg/dL / Ketone: x  / Bili: x / Urobili: x   Blood: x / Protein: x / Nitrite: x   Leuk Esterase: x / RBC: x / WBC x   Sq Epi: x / Non Sq Epi: x / Bacteria: x        Radiology :       ACC: [id number] EXAM:  CT ANGIO BRAIN (W)AW IC   ORDERED BY: EFRAIN MERRITT     ACC: [id number] EXAM:  CT ANGIO NECK (W)AW IC   ORDERED BY: EFRAIN MERRITT     ACC: [id number] EXAM:  CT BRAIN   ORDERED BY: EFRAIN MERRITT     PROCEDURE DATE:  06/04/2025          INTERPRETATION:  CLINICAL INFORMATION: eval will/circulation/CVA    COMPARISON: None.    CONTRAST:  IV Contrast: NONE (accession [id number]), Isovue 370 (accession [id number]),   Isovue 370 (accession [id number])  80 cc administered  20 cc discarded  .    Technique: CT head was performed utilizing axial images from the base of   the skull through the vertex without the administration of intravenous   contrast. Images were reviewed in the bone, brain and subdural windows.    Findings:    There is no evidence of acute intracranial hemorrhage or acute   transcortical infarct. There is a right occipital chronic infarct. The   ventricles are normal in size and caliber.  There is no evidence of mass-effect or midline shift. There is no   evidence of an intra or extra-axial fluid collection.    Visualized paranasal sinuses and bilateral mastoid air cells are clear.      CT ANGIOGRAM OF THE INTRACRANIAL CAROTID ARTERIES:    Technique: CT angiogram of the intracranial carotid arteries was  performed was performed utilizing helical slices from the base of the   skull through the vertex during bolus injection of intravenous contrast   material. Overlapping reconstructions were obtained to allow for sagittal   and coronal reformatted images . 3-D images were created from the data   set. Images were also included.      Findings: Evaluation of the anterior circulation demonstrates normal   course and caliber of the distal internal carotid arteries. There is a   normal appearance to the bilateral anterior cerebral and middle cerebral   arteries.    Evaluation the posterior circulation demonstrates normal course and   caliber of the bilateral vertebral arteries, vertebrobasilar junction and   basilar artery. There is occlusion of the right PCA at the origin. There   is severe stenosis/occlusion of the mid right posterior communicating   artery (image #246 series 7). The bilateral posterior cerebral arteries   are also within normal limits.  The right posterior inferior cerebellar   artery appears within normal limits. The left PICA is not seen.    There is no evidence of aneurysm   .      CT ANGIOGRAM OF THE EXTRACRANIAL CAROTID ARTERIES:    Technique: CT angiogram of the extracranial carotid arteries was   performed was performed utilizing helical slices from the base of the   skull through the mediastinum during bolus injection of intravenous   contrast material. Overlapping reconstructions were obtained to allow for   sagittal and coronal reformatted images. 3-D reconstruction was also   performed.    Findings: Evaluation of the aortic arch and the origin of the great   vessels are within normal limits.    The course and caliber of the bilateral common carotid, internal carotid   and external carotid arteries are within normal limits. There is no   evidence of focal stenosis or dissection.    The origin of the vertebral arteries are within normal limits without   evidence of stenosis. The course and caliber of the cervical segments of   the vertebral arteries are normal.    Degenerative changes of the cervical spine. Osteopenia.    IMPRESSION:    CT of the brain:  No acute intracranial injury  Right occipital chronic infarction.      CTA of the intracranial circulation.  Occlusion of the right PCA at the origin.  No evidence of aneurysm.  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  No evidence of carotid stenosis.          Review of Systems : per HPI         Vital Signs Last 24 Hrs  T(C): 36.8 (05 Jun 2025 05:44), Max: 36.8 (05 Jun 2025 05:44)  T(F): 98.3 (05 Jun 2025 05:44), Max: 98.3 (05 Jun 2025 05:44)  HR: 59 (05 Jun 2025 04:49) (57 - 88)  BP: 142/68 (05 Jun 2025 04:49) (142/68 - 179/79)  BP(mean): 98 (05 Jun 2025 04:49) (98 - 113)  RR: 15 (05 Jun 2025 04:49) (15 - 20)  SpO2: 96% (05 Jun 2025 04:49) (96% - 98%)    Parameters below as of 05 Jun 2025 04:49  Patient On (Oxygen Delivery Method): room air        Physical Exam:  80 y o woman lying comfortably in semi Sher's position , awake , alert , no acute complaints     Head: normocephalic , atraumatic    Eyes: PERRLA , EOMI , no nystagmus , sclera anicteric    ENT / FACE: neg nasal discharge , uvula midline , no oropharyngeal erythema / exudate    Neck: supple , negative JVD , negative carotid bruits , no thyromegaly    Chest: CTA bilaterally      Cardiovascular: regular rate and rhythm , neg murmurs / rubs / gallops    Abdomen: soft , non distended , no tenderness to palpation in all 4 quadrants ,  normal bowel sounds     Extremities: WWP , neg cyanosis /clubbing / edema      Neurologic Exam:     Alert and oriented to person , place , date/year , speech fluent w/o dysarthria     Cranial Nerves:           II:                         pupils equal , round and reactive to light , visual fields intact         III/ IV/VI:             extraocular movements intact , neg nystagmus , neg ptosis        V:                        facial sensation intact , V1-3 normal        VII:                      face symmetric , no droop , normal eye closure and smile        VIII:                     hearing intact to finger rub bilaterally        IX and X:             no hoarseness , gag intact , palate/ uvula rise symmetrically        XI:                       SCM / trapezius strength intact bilateral        XII:                      no tongue deviation    Motor Exam:        > 4/5 x 4 extremities , without drift     Sensation:         intact to light touch x 4 extremities                            no neglect or extinction on double simultaneous testing    DTR:           biceps/brachioradialis: equal                            patella/ankle: equal          neg Babinski     Coordination:            Finger to Nose:  neg dysmetria bilaterally        Gait:  not tested         PM&R Impression: admitted for R sided HA and visual disturbances    - 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    2) Current disposition plan recommendation:    pending functional progress , lives alone, no HHA , independent PTA     
Admission H&P:   **STROKE HPI***    HPI: 80y Female with PMHx of gallstone pancreatitis 2022 presents to ED with visual sx. Reports 5 days ago woke up with R sharp temporal headache and felt like her vision was impaired LUQ, LLQ visual field of both eyes. Felt like she could not focus. Went to ED Friday and was d/c with ocular migraine dx. Saw optho Monday and during vison exam patient saw some shapes on top of the letters and was suggested to present to hospital as ophthalmologist had c/f stroke. Saw an outpatient neurologist Dr. Wolf at [facility] today who sent pt to [facility] ED for c/f stroke. Patient denies any weakness, numbness, gait changes.    INTERVAL HISTORY: seen at bedside with friend Amalia at bedside. Pt expresses frustration at ongoing vision issues and that she is not able to see as clearly since onset of symptoms, she is aware she had a stroke. States that she is very healthy at baseline and exercises frequently, lives alone and is independent. Has had a couple of falls in the last several years, the last time she slipped on went subway platform but sustained no lasting injuries. Feels that the stroke is very surprising to her give her good health at baseline. She has no SOB, RUIZ, CP, abd pain, n/v.      PAST MEDICAL & SURGICAL HISTORY:  No pertinent past medical history          FAMILY HISTORY:      T(C): 36.4 (06-04-25 @ 17:04), Max: 36.6 (06-04-25 @ 10:30)  HR: 60 (06-04-25 @ 17:40) (58 - 88)  BP: 179/79 (06-04-25 @ 17:40) (168/79 - 179/79)  RR: 19 (06-04-25 @ 17:40) (16 - 19)  SpO2: 97% (06-04-25 @ 17:40) (97% - 97%)    MEDICATION RECONCILIATION   MEDICATIONS  (STANDING):  atorvastatin 80 milliGRAM(s) Oral at bedtime  enoxaparin Injectable 40 milliGRAM(s) SubCutaneous every 24 hours    MEDICATIONS  (PRN):    Allergies    No Known Allergies    Intolerances      Vital Signs Last 24 Hrs  T(C): 36.4 (04 Jun 2025 17:04), Max: 36.6 (04 Jun 2025 10:30)  T(F): 97.6 (04 Jun 2025 17:04), Max: 97.8 (04 Jun 2025 10:30)  HR: 60 (04 Jun 2025 17:40) (58 - 88)  BP: 179/79 (04 Jun 2025 17:40) (168/79 - 179/79)  BP(mean): 113 (04 Jun 2025 17:40) (113 - 113)  RR: 19 (04 Jun 2025 17:40) (16 - 19)  SpO2: 97% (04 Jun 2025 17:40) (97% - 97%)    Parameters below as of 04 Jun 2025 17:40  Patient On (Oxygen Delivery Method): room air     (04 Jun 2025 18:27)      PAST MEDICAL & SURGICAL HISTORY:  No pertinent past medical history        Home Medications:    Allergies    No Known Allergies    Intolerances      FAMILY HISTORY:    Social History:  Smoking status: denies  Drinking: denies  Drug Use: denies (04 Jun 2025 18:27)      REVIEW OF SYSTEMS:  RESPIRATORY: No cough, No dyspnea  CARDIOVASCULAR: No chest pain, or leg swelling  GASTROINTESTINAL: no diarrhea  GENITOURINARY: No dysuria,   NEUROLOGICAL: No numbness, or tremors  ALLERGY AND IMMUNOLOGIC: No hives or eczema    Diet, NPO after Midnight:      NPO Start Date: 05-Jun-2025,   NPO Start Time: 23:59 (06-05-25 @ 09:59) [Active]  Diet, Regular (06-04-25 @ 17:36) [Active]      CURRENT MEDICATIONS:   acetaminophen     Tablet .. 650 milliGRAM(s) Oral every 6 hours PRN  aspirin enteric coated 81 milliGRAM(s) Oral daily  atorvastatin 80 milliGRAM(s) Oral at bedtime  clopidogrel Tablet 75 milliGRAM(s) Oral daily  enoxaparin Injectable 40 milliGRAM(s) SubCutaneous every 24 hours      VITAL SIGNS, INS/OUTS (last 24 hours):  Vital Signs Last 24 Hrs  T(C): 36.6 (05 Jun 2025 09:53), Max: 36.8 (05 Jun 2025 05:44)  T(F): 97.9 (05 Jun 2025 09:53), Max: 98.3 (05 Jun 2025 05:44)  HR: 73 (05 Jun 2025 12:50) (57 - 73)  BP: 127/59 (05 Jun 2025 12:50) (127/59 - 179/79)  BP(mean): 85 (05 Jun 2025 12:50) (85 - 113)  RR: 18 (05 Jun 2025 12:50) (15 - 20)  SpO2: 97% (05 Jun 2025 12:50) (96% - 98%)    Parameters below as of 05 Jun 2025 12:50  Patient On (Oxygen Delivery Method): room air      I&O's Summary      PHYSICAL EXAM:  Gen: Reclining in bed at time of exam, appears stated age  HEENT: NCAT, MMM  Neck: supple, trachea at midline  CV: RRR, +S1/S2  Pulm: adequate respiratory effort, no increase in work of breathing  Abd: soft, ND  Skin: warm and dry  Ext: wwp, no edema  Neuro: AOx3, speaking in full sentences   Psych: affect and behavior appropriate, pleasant at time of interview    BASIC LABS:                        14.2   4.84  )-----------( 215      ( 05 Jun 2025 06:25 )             41.9     06-05    142  |  107  |  16  ----------------------------<  97  3.8   |  25  |  0.77    Ca    8.9      05 Jun 2025 06:25  Phos  3.7     06-05  Mg     2.1     06-05    TPro  6.9  /  Alb  4.2  /  TBili  0.4  /  DBili  x   /  AST  21  /  ALT  15  /  AlkPhos  60  06-04      Urinalysis Basic - ( 05 Jun 2025 06:25 )    Color: x / Appearance: x / SG: x / pH: x  Gluc: 97 mg/dL / Ketone: x  / Bili: x / Urobili: x   Blood: x / Protein: x / Nitrite: x   Leuk Esterase: x / RBC: x / WBC x   Sq Epi: x / Non Sq Epi: x / Bacteria: x      CAPILLARY BLOOD GLUCOSE          OTHER LABS:        MICRODATA:      IMAGING:    EKG:    #Diet - Diet, NPO after Midnight:      NPO Start Date: 05-Jun-2025,   NPO Start Time: 23:59 (06-05-25 @ 09:59) [Active]  Diet, Regular (06-04-25 @ 17:36) [Active]        #DVT PPx - enoxaparin Injectable: [Known as LOVENOX]  40 milliGRAM(s), SubCutaneous, every 24 hours  Special Instructions: For patients "At Risk" for DVT/PE administer for duration of hospital stay  Administration Instructions: This is a High Alert Medication. (06-04-25 @ 17:38)

## 2025-06-05 NOTE — PROGRESS NOTE ADULT - NS ATTEND AMEND GEN_ALL_CORE FT
Inpatient Progress Note Follow Up, DOS Thurs 6.5.2025    I performed: review of objective data, interview of patient, and discussion of assessment and plan with patient/family/multidisciplinary team. I agree with ACP/ resident and/or fellow documentation except where indicated.

## 2025-06-05 NOTE — DISCHARGE NOTE PROVIDER - CARE PROVIDERS DIRECT ADDRESSES
[email or web address]

## 2025-06-05 NOTE — OCCUPATIONAL THERAPY INITIAL EVALUATION ADULT - ORIENTATION, REHAB EVAL
OLOG 30/30 (A score of >25 is associated w/ normal orientation)/oriented to person, place, time and situation

## 2025-06-06 ENCOUNTER — TRANSCRIPTION ENCOUNTER (OUTPATIENT)
Age: 80
End: 2025-06-06

## 2025-06-06 ENCOUNTER — RESULT REVIEW (OUTPATIENT)
Age: 80
End: 2025-06-06

## 2025-06-06 VITALS
SYSTOLIC BLOOD PRESSURE: 138 MMHG | RESPIRATION RATE: 16 BRPM | HEART RATE: 62 BPM | DIASTOLIC BLOOD PRESSURE: 67 MMHG | OXYGEN SATURATION: 98 %

## 2025-06-06 LAB
ANION GAP SERPL CALC-SCNC: 11 MMOL/L — SIGNIFICANT CHANGE UP (ref 5–17)
BUN SERPL-MCNC: 18 MG/DL — SIGNIFICANT CHANGE UP (ref 7–23)
CALCIUM SERPL-MCNC: 9.2 MG/DL — SIGNIFICANT CHANGE UP (ref 8.4–10.5)
CHLORIDE SERPL-SCNC: 107 MMOL/L — SIGNIFICANT CHANGE UP (ref 96–108)
CO2 SERPL-SCNC: 24 MMOL/L — SIGNIFICANT CHANGE UP (ref 22–31)
CREAT SERPL-MCNC: 0.85 MG/DL — SIGNIFICANT CHANGE UP (ref 0.5–1.3)
EGFR: 68 ML/MIN/1.73M2 — SIGNIFICANT CHANGE UP
EGFR: 68 ML/MIN/1.73M2 — SIGNIFICANT CHANGE UP
GLUCOSE SERPL-MCNC: 110 MG/DL — HIGH (ref 70–99)
HCT VFR BLD CALC: 44 % — SIGNIFICANT CHANGE UP (ref 34.5–45)
HGB BLD-MCNC: 14.7 G/DL — SIGNIFICANT CHANGE UP (ref 11.5–15.5)
MAGNESIUM SERPL-MCNC: 2.2 MG/DL — SIGNIFICANT CHANGE UP (ref 1.6–2.6)
MCHC RBC-ENTMCNC: 29.6 PG — SIGNIFICANT CHANGE UP (ref 27–34)
MCHC RBC-ENTMCNC: 33.4 G/DL — SIGNIFICANT CHANGE UP (ref 32–36)
MCV RBC AUTO: 88.5 FL — SIGNIFICANT CHANGE UP (ref 80–100)
NRBC BLD AUTO-RTO: 0 /100 WBCS — SIGNIFICANT CHANGE UP (ref 0–0)
PHOSPHATE SERPL-MCNC: 3.6 MG/DL — SIGNIFICANT CHANGE UP (ref 2.5–4.5)
PLATELET # BLD AUTO: 212 K/UL — SIGNIFICANT CHANGE UP (ref 150–400)
POTASSIUM SERPL-MCNC: 4.2 MMOL/L — SIGNIFICANT CHANGE UP (ref 3.5–5.3)
POTASSIUM SERPL-SCNC: 4.2 MMOL/L — SIGNIFICANT CHANGE UP (ref 3.5–5.3)
RBC # BLD: 4.97 M/UL — SIGNIFICANT CHANGE UP (ref 3.8–5.2)
RBC # FLD: 12.7 % — SIGNIFICANT CHANGE UP (ref 10.3–14.5)
SODIUM SERPL-SCNC: 142 MMOL/L — SIGNIFICANT CHANGE UP (ref 135–145)
WBC # BLD: 5.38 K/UL — SIGNIFICANT CHANGE UP (ref 3.8–10.5)
WBC # FLD AUTO: 5.38 K/UL — SIGNIFICANT CHANGE UP (ref 3.8–10.5)

## 2025-06-06 PROCEDURE — 84443 ASSAY THYROID STIM HORMONE: CPT

## 2025-06-06 PROCEDURE — 33285 INSJ SUBQ CAR RHYTHM MNTR: CPT

## 2025-06-06 PROCEDURE — 83735 ASSAY OF MAGNESIUM: CPT

## 2025-06-06 PROCEDURE — 86140 C-REACTIVE PROTEIN: CPT

## 2025-06-06 PROCEDURE — 99232 SBSQ HOSP IP/OBS MODERATE 35: CPT

## 2025-06-06 PROCEDURE — 75572 CT HRT W/3D IMAGE: CPT | Mod: 26

## 2025-06-06 PROCEDURE — 93306 TTE W/DOPPLER COMPLETE: CPT

## 2025-06-06 PROCEDURE — 36415 COLL VENOUS BLD VENIPUNCTURE: CPT

## 2025-06-06 PROCEDURE — 83036 HEMOGLOBIN GLYCOSYLATED A1C: CPT

## 2025-06-06 PROCEDURE — 96374 THER/PROPH/DIAG INJ IV PUSH: CPT

## 2025-06-06 PROCEDURE — 85025 COMPLETE CBC W/AUTO DIFF WBC: CPT

## 2025-06-06 PROCEDURE — 70450 CT HEAD/BRAIN W/O DYE: CPT

## 2025-06-06 PROCEDURE — 93306 TTE W/DOPPLER COMPLETE: CPT | Mod: 26

## 2025-06-06 PROCEDURE — 97165 OT EVAL LOW COMPLEX 30 MIN: CPT

## 2025-06-06 PROCEDURE — 70498 CT ANGIOGRAPHY NECK: CPT

## 2025-06-06 PROCEDURE — 85027 COMPLETE CBC AUTOMATED: CPT

## 2025-06-06 PROCEDURE — 70496 CT ANGIOGRAPHY HEAD: CPT

## 2025-06-06 PROCEDURE — 80061 LIPID PANEL: CPT

## 2025-06-06 PROCEDURE — 99285 EMERGENCY DEPT VISIT HI MDM: CPT

## 2025-06-06 PROCEDURE — 84100 ASSAY OF PHOSPHORUS: CPT

## 2025-06-06 PROCEDURE — 80053 COMPREHEN METABOLIC PANEL: CPT

## 2025-06-06 PROCEDURE — 80048 BASIC METABOLIC PNL TOTAL CA: CPT

## 2025-06-06 PROCEDURE — C1764: CPT

## 2025-06-06 PROCEDURE — 97162 PT EVAL MOD COMPLEX 30 MIN: CPT

## 2025-06-06 PROCEDURE — 85652 RBC SED RATE AUTOMATED: CPT

## 2025-06-06 PROCEDURE — 75572 CT HRT W/3D IMAGE: CPT

## 2025-06-06 RX ORDER — CLOPIDOGREL BISULFATE 75 MG/1
1 TABLET, FILM COATED ORAL
Qty: 19 | Refills: 0
Start: 2025-06-06 | End: 2025-06-24

## 2025-06-06 RX ORDER — ASPIRIN 325 MG
1 TABLET ORAL
Qty: 30 | Refills: 0
Start: 2025-06-06 | End: 2025-07-05

## 2025-06-06 RX ORDER — ATORVASTATIN CALCIUM 80 MG/1
1 TABLET, FILM COATED ORAL
Qty: 30 | Refills: 0
Start: 2025-06-06 | End: 2025-07-05

## 2025-06-06 RX ORDER — LIDOCAINE HCL/EPINEPHRINE/PF 1 %-1:200K
25 AMPUL (ML) INJECTION ONCE
Refills: 0 | Status: COMPLETED | OUTPATIENT
Start: 2025-06-06 | End: 2025-06-06

## 2025-06-06 RX ADMIN — CLOPIDOGREL BISULFATE 75 MILLIGRAM(S): 75 TABLET, FILM COATED ORAL at 14:21

## 2025-06-06 RX ADMIN — Medication 650 MILLIGRAM(S): at 14:21

## 2025-06-06 RX ADMIN — Medication 650 MILLIGRAM(S): at 15:20

## 2025-06-06 RX ADMIN — Medication 25 MILLILITER(S): at 15:52

## 2025-06-06 RX ADMIN — Medication 81 MILLIGRAM(S): at 14:21

## 2025-06-06 NOTE — CHART NOTE - NSCHARTNOTEFT_GEN_A_CORE
Pt will require a shower chair due to dx of stroke I63.9 to help complete MRADL's.
Electrophysiology PA Procedure Note:    Diagnosis: Cryptogenic Stroke   s/p Loop Recorder Implant.  Used local anesthestic. Wound closed with Dermabond  Model: LINQ 2   Serial number: [id number]  Follow up with wound check clinic with NANDINI Cedillo on 6/18/25 at 10:45 AM.   [address] floor   [phone]   The patient can wet wound tomorrow. The patient was given instruction on how to use the Home Remote Monitoring system (via phone prasanth) for associated for the Loop Recorder.

## 2025-06-06 NOTE — PROCEDURAL SAFETY CHECKLIST WITH OR WITHOUT SEDATION - NSTEAMNURSEFT_GEN_ALL_CORE
DISCHARGE INSTRUCTIONS     As part of your transition home, we are providing you with this set of discharge instructions, as a guide to help you in that process. In addition to the care provided during your hospital stay, there may be upcoming clinic visits, laboratory appointments, and/or results noted on this After Visit Summary (AVS).     To assist the physicians caring for you during this transition, we would like you remind you of the followin. Please call a Provider for help if you experience any of the following: Any questions or concerns  2. Activity Instructions: Normal activity as tolerated  3. Dressing/Wound Instructions: Not applicable  4. Lifting & Weight-bearing Instructions: No restrictions  5. Diet: Return to previous diet  6. Miscellaneous:   Medication Changes:  We have started 2 new medications to help with your heart:  Carvedilol 12.5mg two times a day  Aspirin 81mg daily  We have 2 medications we ordered as needed for constipation  Miralax - dissolve 1 packet up to 2 times per day as needed for constipation  Senna - take up to 1 tablet 2 times per day as needed for constipation  Follow-up:  Primary Care Physician Appointment: 12/3/2024 at 10AM    If you have any questions 24-48 hours after discharge, please feel free to contact the hospital unit/department you were discharged from.    
Amanda Cisse

## 2025-06-06 NOTE — DISCHARGE NOTE NURSING/CASE MANAGEMENT/SOCIAL WORK - FINANCIAL ASSISTANCE
[facility] provides services at a reduced cost to those who are determined to be eligible through [facility]’s financial assistance program. Information regarding [facility]’s financial assistance program can be found by going to [email or web address] or by calling [phone].

## 2025-06-06 NOTE — PROGRESS NOTE ADULT - SUBJECTIVE AND OBJECTIVE BOX
Physical Medicine and Rehabilitation Progress Note :       Patient is a 80y old  Female who presents with a chief complaint of acute stroke (05 Jun 2025 14:32)      HPI:   **STROKE HPI***    HPI: 80y Female with PMHx of gallstone pancreatitis 2022 presents to ED with visual sx. Reports 5 days ago woke up with R sharp temporal headache and felt like her vision was impaired LUQ, LLQ visual field of both eyes. Felt like she could not focus. Went to ED Friday and was d/c with ocular migraine dx. Saw optho Monday and during vison exam patient saw some shapes on top of the letters and was suggested to present to hospital as ophthalmologist had c/f stroke. Saw an outpatient neurologist Dr. Wolf at [facility] who sent pt to [facility] ED for c/f stroke. Patient denies any weakness, numbness, gait changes.    PAST MEDICAL & SURGICAL HISTORY:  No pertinent past medical history          FAMILY HISTORY:      T(C): 36.4 (06-04-25 @ 17:04), Max: 36.6 (06-04-25 @ 10:30)  HR: 60 (06-04-25 @ 17:40) (58 - 88)  BP: 179/79 (06-04-25 @ 17:40) (168/79 - 179/79)  RR: 19 (06-04-25 @ 17:40) (16 - 19)  SpO2: 97% (06-04-25 @ 17:40) (97% - 97%)    MEDICATION RECONCILIATION   MEDICATIONS  (STANDING):  atorvastatin 80 milliGRAM(s) Oral at bedtime  enoxaparin Injectable 40 milliGRAM(s) SubCutaneous every 24 hours    MEDICATIONS  (PRN):    Allergies    No Known Allergies    Intolerances      Vital Signs Last 24 Hrs  T(C): 36.4 (04 Jun 2025 17:04), Max: 36.6 (04 Jun 2025 10:30)  T(F): 97.6 (04 Jun 2025 17:04), Max: 97.8 (04 Jun 2025 10:30)  HR: 60 (04 Jun 2025 17:40) (58 - 88)  BP: 179/79 (04 Jun 2025 17:40) (168/79 - 179/79)  BP(mean): 113 (04 Jun 2025 17:40) (113 - 113)  RR: 19 (04 Jun 2025 17:40) (16 - 19)  SpO2: 97% (04 Jun 2025 17:40) (97% - 97%)    Parameters below as of 04 Jun 2025 17:40  Patient On (Oxygen Delivery Method): room air     (04 Jun 2025 18:27)                            14.7   5.38  )-----------( 212      ( 06 Jun 2025 06:31 )             44.0       06-06    142  |  107  |  18  ----------------------------<  110[H]  4.2   |  24  |  0.85    Ca    9.2      06 Jun 2025 06:31  Phos  3.6     06-06  Mg     2.2     06-06    TPro  6.9  /  Alb  4.2  /  TBili  0.4  /  DBili  x   /  AST  21  /  ALT  15  /  AlkPhos  60  06-04    Vital Signs Last 24 Hrs  T(C): 37.1 (06 Jun 2025 09:05), Max: 37.1 (06 Jun 2025 09:05)  T(F): 98.7 (06 Jun 2025 09:05), Max: 98.7 (06 Jun 2025 09:05)  HR: 51 (06 Jun 2025 05:22) (51 - 73)  BP: 150/63 (06 Jun 2025 05:22) (127/59 - 169/76)  BP(mean): 91 (06 Jun 2025 05:22) (85 - 116)  RR: 15 (06 Jun 2025 05:22) (15 - 33)  SpO2: 95% (06 Jun 2025 05:22) (94% - 97%)    Parameters below as of 06 Jun 2025 05:22  Patient On (Oxygen Delivery Method): room air        MEDICATIONS  (STANDING):  aspirin enteric coated 81 milliGRAM(s) Oral daily  atorvastatin 80 milliGRAM(s) Oral at bedtime  clopidogrel Tablet 75 milliGRAM(s) Oral daily  enoxaparin Injectable 40 milliGRAM(s) SubCutaneous every 24 hours    MEDICATIONS  (PRN):  acetaminophen     Tablet .. 650 milliGRAM(s) Oral every 6 hours PRN Temp greater or equal to 38C (100.4F), Mild Pain (1 - 3), Moderate Pain (4 - 6)      T(C): 37.1 (06-06-25 @ 09:05), Max: 37.1 (06-06-25 @ 09:05)  HR: 51 (06-06-25 @ 05:22) (51 - 73)  BP: 150/63 (06-06-25 @ 05:22) (127/59 - 169/76)  RR: 15 (06-06-25 @ 05:22) (15 - 33)  SpO2: 95% (06-06-25 @ 05:22) (94% - 97%)    Physical Exam:   80 y o woman lying comfortably in semi Sher's position , awake , alert , no acute complaints     Head: normocephalic , atraumatic    Eyes: PERRLA , EOMI , no nystagmus , sclera anicteric    ENT / FACE: neg nasal discharge , uvula midline , no oropharyngeal erythema / exudate    Neck: supple , negative JVD , negative carotid bruits , no thyromegaly    Chest: CTA bilaterally      Cardiovascular: regular rate and rhythm , neg murmurs / rubs / gallops    Abdomen: soft , non distended , no tenderness to palpation in all 4 quadrants ,  normal bowel sounds     Extremities: WWP , neg cyanosis /clubbing / edema      Neurologic Exam:     Alert and oriented to person , place , date/year , speech fluent w/o dysarthria     Cranial Nerves:           II:                         pupils equal , round and reactive to light , visual fields intact         III/ IV/VI:             extraocular movements intact , neg nystagmus , neg ptosis        V:                        facial sensation intact , V1-3 normal        VII:                      face symmetric , no droop , normal eye closure and smile        VIII:                     hearing intact to finger rub bilaterally        IX and X:             no hoarseness , gag intact , palate/ uvula rise symmetrically        XI:                       SCM / trapezius strength intact bilateral        XII:                      no tongue deviation    Motor Exam:        > 4/5 x 4 extremities , without drift     Sensation:         intact to light touch x 4 extremities                            no neglect or extinction on double simultaneous testing    DTR:           biceps/brachioradialis: equal                            patella/ankle: equal          neg Babinski     Coordination:            Finger to Nose:  neg dysmetria bilaterally      Initial Functional Status Assessment :       Previous Level of Function:     · Ambulation Skills  independent  · Transfer Skills  independent  · ADL Skills  independent  · Additional Comments  Pt lives alone in an apt with elevator, 1 step enter. Prior to admission, pt ambulated independently without AD. Pt has a rollator at home. pt is R hand dominant, wear glasses all the time.m    Cognitive Status Examination:   · Orientation  oriented to person, place, time and situation  · Level of Consciousness  alert  · Follows Commands and Answers Questions  100% of the time; able to follow multistep instructions  · Personal Safety and Judgment  intact    Range of Motion Exam:   · Active Range of Motion Examination  AROM WFL through out    Manual Muscle Testing:   · Manual Muscle Testing Results  b/l UEs ~5/5 t/o, b/l LEs~5/5 t/o.    Bed Mobility: Scooting/Bridging:     · Level of [place]  independent    Bed Mobility: Sit to Supine:     · Level of [place]  independent    Bed Mobility: Supine to Sit:     · Level of [place]  independent    Transfer: Sit to Stand:     · Level of [place]  supervision  · Physical Assist/Nonphysical Assist  supervision  · Assistive Device  none    Transfer: Stand to Sit:     · Level of [place]  supervision  · Physical Assist/Nonphysical Assist  supervision  · Assistive Device  none    Gait Skills:     · Level of [place]  CS--> Supervision  · Assistive Device  none  · Gait Distance  150 feet    Gait Analysis:     · Gait Pattern Used  swing-through gait  · Gait Deviations Noted  decreased richie; increased time in double stance; decreased stride length; fairly steady gait, no lose of balance, decreased heel strike and hip flexion bilaterally.  · Impairments Contributing to Gait Deviations  impaired balance; impaired postural control    Stair Negotiation:     · Level of [place]  supervision  · Physical Assist/Nonphysical Assist  supervision  · Assistive Device  right rail up; right rail down  · Stair Pattern  step to step  · Number of Stairs  4    Balance Skills Assessment:     · Sitting Balance: Static  good balance  · Sitting Balance: Dynamic  good balance  · Sit-to-Stand Balance  good balance  · Standing Balance: Static  good balance  · Standing Balance: Dynamic  fair plus    Sensory Examination:   Sensory Examination:    Light Touch Sensation:   · Left UE  within normal limits  · Right UE  within normal limits  · Left LE  within normal limits  · Right LE  within normal limits    · Coordination Assessed  finger to nose; intact b/l UEs      Proprioception:   · Coordination Assessed  finger to nose; intact b/l UEs      Treatment Location:   · Comments  CN Testing: B/L Frontalis intact; B/L buccinator intact; smile symmetrical; tongue protrusion at midline; B/L eyes open/close intact; Shoulder elevation: intact bilaterally; Vision H-Test: bilateral tracking and smooth pursuit intact; Convergence/Divergence: intact; Vision Quadrant Test: decreased peripheral vision on L side on both eyes.    Clinical Impressions:   · Criteria for Skilled Therapeutic Interventions  impairments found; functional limitations in following categories; rehab potential; therapy frequency; anticipated discharge recommendation    Balance Skills Assessment:     · Sitting Balance: Static  normal balance  · Sitting Balance: Dynamic  normal balance  · Sit-to-Stand Balance  good balance  · Standing Balance: Static  good minus  · Standing Balance: Dynamic  fair plus  · Systems Impairment Contributing to Balance Disturbance  visual; musculoskeletal; neuromuscular  · Identified Impairments Contributing to Balance Disturbance  decreased strength; impaired coordination    Sensory Examination:     Grossly Intact:   · Gross Sensory Examination  Grossly Intact; Left UE; Right UE; Pt reports h/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    · Balance Skills  Pt ambulated ~30ft x2 w/ CGA    · Coordination Assessed  Dysmetric 2/2 visual impairment      Proprioception:   · Coordination Assessed  Dysmetric 2/2 visual impairment    · Sensory Tests  CN Testing: b/l frontalis intact; b/l buccinator intact; smile symmetrical; tongue protrusion at midline; b/l eyes open/close intact; b/l shoulder elevation intact    Visual Assessment:   Visual Assessment:    Visual Assessment:   · Visual Tracking  up gaze; Difficulty  · Visual Neglect  L visual field deficits  · Visual Field Cuts  not observed  · Eye Muscle Balance  normal  · Visual Scanning  severe impairment; Left side  · Visual Convergence  normal  · Visual Depth Perception  moderate impairment    Fine Motor Coordination:     Grossly Intact:   · Grossly Intact  Left UE; Right UE      Fine Motor Coordination:   · Left Hand, Finger to Nose  moderate impairment  · Right Hand, Finger to Nose  moderate impairment  · Left Hand Thumb/Finger Opposition Skills  normal performance  · Right Hand Thumb/Finger Opposition Skills  normal performance  · Left Hand, Manipulation of Objects  normal performance  · Right Hand, Manipulation of Objects  normal performance  · Left Hand, Diadochokinesis Skills  normal performance  · Right Hand, Diadochokinesis Skills  normal performance    Upper Body Dressing Training:     · Level of [place]  supervision  · Physical Assist/Nonphysical Assist  supervision; verbal cues    Lower Body Dressing Training:     · Level of [place]  supervision  · Physical Assist/Nonphysical Assist  supervision; verbal cues    Grooming Training:     · Level of [place]  supervision    Eating/Self-Feeding Training:     · Level of [place]  independent        PM&R Impression : as above    Current disposition plan recommendation :   d/c home with home physical and occupational therapy     
Neurology Stroke Progress Note    INTERVAL HPI/OVERNIGHT EVENTS:  Patient seen and examined.      MEDICATIONS  (STANDING):  aspirin enteric coated 81 milliGRAM(s) Oral daily  atorvastatin 80 milliGRAM(s) Oral at bedtime  clopidogrel Tablet 75 milliGRAM(s) Oral daily  enoxaparin Injectable 40 milliGRAM(s) SubCutaneous every 24 hours    MEDICATIONS  (PRN):  acetaminophen     Tablet .. 650 milliGRAM(s) Oral every 6 hours PRN Temp greater or equal to 38C (100.4F), Mild Pain (1 - 3), Moderate Pain (4 - 6)      Allergies    No Known Allergies    Intolerances        Vital Signs Last 24 Hrs  T(C): 36.8 (05 Jun 2025 14:40), Max: 36.8 (05 Jun 2025 05:44)  T(F): 98.2 (05 Jun 2025 14:40), Max: 98.3 (05 Jun 2025 05:44)  HR: 67 (05 Jun 2025 14:45) (57 - 73)  BP: 149/66 (05 Jun 2025 14:45) (127/59 - 179/79)  BP(mean): 95 (05 Jun 2025 14:45) (85 - 113)  RR: 27 (05 Jun 2025 14:45) (15 - 33)  SpO2: 97% (05 Jun 2025 12:50) (96% - 98%)    Parameters below as of 05 Jun 2025 12:50  Patient On (Oxygen Delivery Method): room air        Neurologic:  -Mental status: Awake, alert, oriented to person, place, and time. Speech is fluent with intact naming, repetition, and comprehension, no dysarthria. Recent and remote memory intact. Follows commands. Attention/concentration intact.    -Cranial nerves:   II: +LHH  III, IV, VI: Extraocular movements are intact without nystagmus. Pupils equally round and reactive to light  V:  Facial sensation V1-V3 equal and intact   VII: subtle L NLFF  VIII: Hearing is bilaterally intact to voice  Motor: Normal bulk and tone. No pronator drift. Strength bilateral upper extremity 5/5, bilateral lower extremities 5/5.  Sensation: Intact to light touch bilaterally. No neglect or extinction on double simultaneous testing.  Coordination: No dysmetria on finger-to-nose bilaterally  Gait: deferred    LABS:                        14.2   4.84  )-----------( 215      ( 05 Jun 2025 06:25 )             41.9     06-05    142  |  107  |  16  ----------------------------<  97  3.8   |  25  |  0.77    Ca    8.9      05 Jun 2025 06:25  Phos  3.7     06-05  Mg     2.1     06-05    TPro  6.9  /  Alb  4.2  /  TBili  0.4  /  DBili  x   /  AST  21  /  ALT  15  /  AlkPhos  60  06-04      Urinalysis Basic - ( 05 Jun 2025 06:25 )    Color: x / Appearance: x / SG: x / pH: x  Gluc: 97 mg/dL / Ketone: x  / Bili: x / Urobili: x   Blood: x / Protein: x / Nitrite: x   Leuk Esterase: x / RBC: x / WBC x   Sq Epi: x / Non Sq Epi: x / Bacteria: x        RADIOLOGY & ADDITIONAL TESTS:  reviewed  
Patient is a 80y old  Female who presents with a chief complaint of acute stroke (05 Jun 2025 14:32)    INTERVAL EVENTS:  - worked with PT yesterday, rec for outpatient PT    SUBJECTIVE:  - Patient was seen and examined at bedside, feels well other than ongoing visual disturbances  - no headache  - looking forward to going home    Diet, NPO after Midnight:      NPO Start Date: 05-Jun-2025,   NPO Start Time: 23:59 (06-05-25 @ 09:59) [Active]  Diet, Regular (06-04-25 @ 17:36) [Active]    MEDICATIONS  (STANDING):  aspirin enteric coated 81 milliGRAM(s) Oral daily  atorvastatin 80 milliGRAM(s) Oral at bedtime  clopidogrel Tablet 75 milliGRAM(s) Oral daily  enoxaparin Injectable 40 milliGRAM(s) SubCutaneous every 24 hours  lidocaine 1%/epinephrine 1:100,000 Inj 25 milliLiter(s) Local Injection once    MEDICATIONS  (PRN):  acetaminophen     Tablet .. 650 milliGRAM(s) Oral every 6 hours PRN Temp greater or equal to 38C (100.4F), Mild Pain (1 - 3), Moderate Pain (4 - 6)    Allergies  No Known Allergies  Intolerances    OBJECTIVE:  Vital Signs Last 24 Hrs  T(C): 37.1 (06 Jun 2025 09:05), Max: 37.1 (06 Jun 2025 09:05)  T(F): 98.7 (06 Jun 2025 09:05), Max: 98.7 (06 Jun 2025 09:05)  HR: 67 (06 Jun 2025 08:50) (51 - 73)  BP: 150/67 (06 Jun 2025 08:50) (127/59 - 168/74)  BP(mean): 97 (06 Jun 2025 08:50) (85 - 116)  RR: 16 (06 Jun 2025 08:50) (15 - 33)  SpO2: 96% (06 Jun 2025 08:50) (94% - 97%)    Parameters below as of 06 Jun 2025 08:50  Patient On (Oxygen Delivery Method): room air    I&O's Summary    06 Jun 2025 07:01  -  06 Jun 2025 11:35  --------------------------------------------------------  IN: 280 mL / OUT: 0 mL / NET: 280 mL    PHYSICAL EXAM  Gen: sitting in chair, INAD, well appearing  HEENT: NCAT, MMM  Neck: supple, trachea at midline  CV: RRR  Pulm: adequate respiratory effort, no increase in work of breathing  Abd: soft, ND  Skin: warm and dry  Ext: wwp, no edema  Neuro: AOx3, speaking in full sentences   Psych: affect and behavior appropriate, pleasant at time of interview      LABS             14.7   5.38  )-----------( 212      ( 06 Jun 2025 06:31 )             44.0     06-06    142  |  107  |  18  ----------------------------<  110[H]  4.2   |  24  |  0.85    Ca    9.2      06 Jun 2025 06:31  Phos  3.6     06-06  Mg     2.2     06-06    CAPILLARY BLOOD GLUCOSE    Urinalysis Basic - ( 06 Jun 2025 06:31 )    Color: x / Appearance: x / SG: x / pH: x  Gluc: 110 mg/dL / Ketone: x  / Bili: x / Urobili: x   Blood: x / Protein: x / Nitrite: x   Leuk Esterase: x / RBC: x / WBC x   Sq Epi: x / Non Sq Epi: x / Bacteria: x        MICRODATA:      RADIOLOGY/OTHER STUDIES:

## 2025-06-06 NOTE — DISCHARGE NOTE NURSING/CASE MANAGEMENT/SOCIAL WORK - PATIENT PORTAL LINK FT
You can access the FollowMyHealth Patient Portal offered by [facility] by registering at the following website: [email or web address]. By joining [facility]’s FollowMyHealth portal, you will also be able to view your health information using other applications (apps) compatible with our system.

## 2025-06-06 NOTE — DISCHARGE NOTE NURSING/CASE MANAGEMENT/SOCIAL WORK - NSDCPEFALRISK_GEN_ALL_CORE
For information on Fall & Injury Prevention, visit: [email or web address] OR  [email or web address] OR  https://www.cdc.gov/steadi/patient.html

## 2025-06-06 NOTE — PROGRESS NOTE ADULT - ASSESSMENT
80y Female with PMHx of gallstone pancreatitis 2022 presents to ED with visual disturbance in L visual fields bilaterally x 5 days. 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 NIHSS 2. Admitted for stroke w/u.    Neuro  #CVA workup  - continue aspirin 81mg and plavix 75mg daily  - continue atorvastatin 80mg daily  - q4hr stroke neuro checks and vitals  - no need for MRI as stroke is seen on CTH  - provided stroke education about signs and symptoms    Cards  - gradual normotension as pt is 5 days out of sx onset  - gradual normotension  - obtain TTE, TRAMAINE, ILR - EP consulted    #HLD  - high dose statin as above in CVA  - LDL results: 141    Pulm  - call provider if SPO2 < 94%    GI  #Nutrition/Fluids/Electrolytes   - replete K<4 and Mg <2  - Diet: reg, NPO after MN    Renal  - trend BMP    Infectious Disease  - ron    Endocrine  - A1C results: 5.3    - TSH results: 4.2    DVT Prophylaxis  - lovenox sq for DVT prophylaxis   - SCDs for DVT prophylaxis     Dispo: home OT     Discussed daily hospital plans and goals with patient and family at bedside.     Discussed with Neurology Attending Dr. Dan  
Neurology    80y Female with PMHx of gallstone pancreatitis 2022 presents to ED with visual disturbance in L visual fields bilaterally x 5 days. 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 NIHSS 2. Admitted for stroke w/u.    Neuro  #CVA workup  - continue aspirin 81mg and plavix 75mg daily  - continue atorvastatin 80mg daily  - q4hr stroke neuro checks and vitals  - no need for MRI as stroke is seen on CTH  - provided stroke education about signs and symptoms    Cards  - gradual normotension as pt is 5 days out of sx onset  - gradual normotension  - obtain TTE, TRAMAINE, ILR - EP consulted    #HLD  - high dose statin as above in CVA  - LDL results: 141    Pulm  - call provider if SPO2 < 94%    GI  #Nutrition/Fluids/Electrolytes   - replete K<4 and Mg <2  - Diet: reg, NPO after MN    Renal  - trend BMP    Infectious Disease  - ron    Endocrine  - A1C results: 5.3    - TSH results: 4.2    DVT Prophylaxis  - lovenox sq for DVT prophylaxis   - SCDs for DVT prophylaxis     Dispo: home OT     Discussed daily hospital plans and goals with patient and family at bedside.     Discussed with Neurology Attending Dr. Dan      
80F with history of gallstone pancreatitis (2022), presented for several days of R eye pain and changes in vision, admitted for work up R PCA stroke.    #CVA work up  #PCA stroke  #HLD  - CTH: R chronic occipital infarct  - CTA: R PCA occlusion, mid R PCA severe stenosis/occlusion  - bMRI deferred given e/o stroke on CT  - TTE/TRAMAINE, ILR pending  - LDL is 141, on high intensity statin  - on ASA/plavix    DVT ppx: lovenox  Dispo: home

## 2025-06-06 NOTE — DISCHARGE NOTE NURSING/CASE MANAGEMENT/SOCIAL WORK - NSDCFUADDAPPT_GEN_ALL_CORE_FT
You will receive a call from the neurology office to set up an outpatient follow up appointment. If you do not receive a call within one week of discharge, please call [phone] and ask to make an appointment with a provider from the stroke team. Continue aspirin and plavix for 21 days then aspirin alone.     Follow up with your PCP within 2 weeks of discharge.    EP ***

## 2025-06-16 ENCOUNTER — APPOINTMENT (OUTPATIENT)
Dept: INTERNAL MEDICINE | Facility: CLINIC | Age: 80
End: 2025-06-16

## 2025-06-17 ENCOUNTER — APPOINTMENT (OUTPATIENT)
Dept: NEUROLOGY | Facility: CLINIC | Age: 80
End: 2025-06-17
Payer: MEDICARE

## 2025-06-17 VITALS
HEART RATE: 81 BPM | BODY MASS INDEX: 27.09 KG/M2 | TEMPERATURE: 96.3 F | SYSTOLIC BLOOD PRESSURE: 128 MMHG | HEIGHT: 60 IN | WEIGHT: 138 LBS | DIASTOLIC BLOOD PRESSURE: 73 MMHG | OXYGEN SATURATION: 96 %

## 2025-06-17 PROBLEM — H53.47 HEMIANOPSIA: Status: ACTIVE | Noted: 2025-06-17

## 2025-06-17 PROCEDURE — 93880 EXTRACRANIAL BILAT STUDY: CPT

## 2025-06-17 PROCEDURE — 93886 INTRACRANIAL COMPLETE STUDY: CPT

## 2025-06-17 PROCEDURE — 99205 OFFICE O/P NEW HI 60 MIN: CPT

## 2025-06-17 RX ORDER — CLOPIDOGREL BISULFATE 75 MG/1
75 TABLET, FILM COATED ORAL
Qty: 19 | Refills: 0 | Status: ACTIVE | COMMUNITY
Start: 2025-06-06

## 2025-06-17 RX ORDER — ATORVASTATIN CALCIUM 80 MG/1
80 TABLET, FILM COATED ORAL
Qty: 30 | Refills: 0 | Status: ACTIVE | COMMUNITY
Start: 2025-06-06

## 2025-06-18 ENCOUNTER — APPOINTMENT (OUTPATIENT)
Dept: HEART AND VASCULAR | Facility: CLINIC | Age: 80
End: 2025-06-18
Payer: MEDICARE

## 2025-06-18 VITALS
OXYGEN SATURATION: 95 % | HEART RATE: 80 BPM | BODY MASS INDEX: 24.45 KG/M2 | SYSTOLIC BLOOD PRESSURE: 122 MMHG | TEMPERATURE: 97.9 F | WEIGHT: 138 LBS | DIASTOLIC BLOOD PRESSURE: 70 MMHG | HEIGHT: 63 IN

## 2025-06-18 PROBLEM — Z95.818 IMPLANTABLE LOOP RECORDER PRESENT: Status: ACTIVE | Noted: 2025-06-18

## 2025-06-18 PROCEDURE — 93285 PRGRMG DEV EVAL SCRMS IP: CPT

## 2025-06-20 DIAGNOSIS — R29.702 NIHSS SCORE 2: ICD-10-CM

## 2025-06-20 DIAGNOSIS — E78.5 HYPERLIPIDEMIA, UNSPECIFIED: ICD-10-CM

## 2025-06-20 DIAGNOSIS — I63.511 CEREBRAL INFARCTION DUE TO UNSPECIFIED OCCLUSION OR STENOSIS OF RIGHT MIDDLE CEREBRAL ARTERY: ICD-10-CM

## 2025-06-20 DIAGNOSIS — H53.47 HETERONYMOUS BILATERAL FIELD DEFECTS: ICD-10-CM

## 2025-06-20 DIAGNOSIS — H54.7 UNSPECIFIED VISUAL LOSS: ICD-10-CM

## 2025-06-20 DIAGNOSIS — I63.531 CEREBRAL INFARCTION DUE TO UNSPECIFIED OCCLUSION OR STENOSIS OF RIGHT POSTERIOR CEREBRAL ARTERY: ICD-10-CM

## 2025-06-23 ENCOUNTER — APPOINTMENT (OUTPATIENT)
Dept: OPHTHALMOLOGY | Facility: CLINIC | Age: 80
End: 2025-06-23
Payer: MEDICARE

## 2025-06-23 ENCOUNTER — NON-APPOINTMENT (OUTPATIENT)
Age: 80
End: 2025-06-23

## 2025-06-23 PROCEDURE — 92133 CPTRZD OPH DX IMG PST SGM ON: CPT

## 2025-06-23 PROCEDURE — 92083 EXTENDED VISUAL FIELD XM: CPT

## 2025-06-23 PROCEDURE — 92004 COMPRE OPH EXAM NEW PT 1/>: CPT

## 2025-06-26 ENCOUNTER — APPOINTMENT (OUTPATIENT)
Dept: MRI IMAGING | Facility: CLINIC | Age: 80
End: 2025-06-26
Payer: MEDICARE

## 2025-06-26 PROCEDURE — 70551 MRI BRAIN STEM W/O DYE: CPT

## 2025-07-01 ENCOUNTER — NON-APPOINTMENT (OUTPATIENT)
Age: 80
End: 2025-07-01

## 2025-07-01 ENCOUNTER — APPOINTMENT (OUTPATIENT)
Dept: NEUROLOGY | Facility: CLINIC | Age: 80
End: 2025-07-01
Payer: MEDICARE

## 2025-07-01 VITALS
BODY MASS INDEX: 24.45 KG/M2 | SYSTOLIC BLOOD PRESSURE: 119 MMHG | DIASTOLIC BLOOD PRESSURE: 71 MMHG | WEIGHT: 138 LBS | TEMPERATURE: 98.3 F | HEIGHT: 63 IN | OXYGEN SATURATION: 95 % | HEART RATE: 86 BPM

## 2025-07-01 PROBLEM — R26.89 BALANCE PROBLEM: Status: ACTIVE | Noted: 2025-07-01

## 2025-07-01 PROBLEM — R20.0 NUMBNESS AND TINGLING: Status: ACTIVE | Noted: 2025-07-01

## 2025-07-01 PROCEDURE — 99215 OFFICE O/P EST HI 40 MIN: CPT

## 2025-07-02 LAB
FOLATE SERPL-MCNC: >20 NG/ML
T3 SERPL-MCNC: 92 NG/DL
T4 SERPL-MCNC: 8.1 UG/DL
TSH SERPL-ACNC: 1.92 UIU/ML
VIT B12 SERPL-MCNC: 828 PG/ML

## 2025-07-03 LAB
ALBUMIN MFR SERPL ELPH: 60.8 %
ALBUMIN SERPL-MCNC: 4 G/DL
ALBUMIN/GLOB SERPL: 1.5 RATIO
ALPHA1 GLOB MFR SERPL ELPH: 4.7 %
ALPHA1 GLOB SERPL ELPH-MCNC: 0.3 G/DL
ALPHA2 GLOB MFR SERPL ELPH: 10.5 %
ALPHA2 GLOB SERPL ELPH-MCNC: 0.7 G/DL
B-GLOBULIN MFR SERPL ELPH: 12 %
B-GLOBULIN SERPL ELPH-MCNC: 0.8 G/DL
ESTIMATED AVERAGE GLUCOSE: 117 MG/DL
GAMMA GLOB FLD ELPH-MCNC: 0.8 G/DL
GAMMA GLOB MFR SERPL ELPH: 12 %
HBA1C MFR BLD HPLC: 5.7 %
INTERPRETATION SERPL IEP-IMP: NORMAL
M PROTEIN SPEC IFE-MCNC: NORMAL
PROT SERPL-MCNC: 6.6 G/DL
PROT SERPL-MCNC: 6.6 G/DL

## 2025-07-07 LAB — METHYLMALONATE SERPL-SCNC: 266 NMOL/L

## 2025-07-15 ENCOUNTER — APPOINTMENT (OUTPATIENT)
Dept: INTERNAL MEDICINE | Facility: CLINIC | Age: 80
End: 2025-07-15
Payer: MEDICARE

## 2025-07-15 VITALS
TEMPERATURE: 97.16 F | RESPIRATION RATE: 16 BRPM | HEART RATE: 78 BPM | SYSTOLIC BLOOD PRESSURE: 129 MMHG | BODY MASS INDEX: 24.45 KG/M2 | DIASTOLIC BLOOD PRESSURE: 71 MMHG | WEIGHT: 138 LBS | OXYGEN SATURATION: 97 %

## 2025-07-15 PROBLEM — Z09 ENCOUNTER FOR FOLLOW-UP EXAMINATION: Status: ACTIVE | Noted: 2025-07-15 | Resolved: 2025-07-29

## 2025-07-15 PROBLEM — E78.5 HLD (HYPERLIPIDEMIA): Status: ACTIVE | Noted: 2025-07-15

## 2025-07-15 PROBLEM — Z86.73 HISTORY OF CEREBROVASCULAR ACCIDENT: Status: RESOLVED | Noted: 2025-06-17 | Resolved: 2025-07-15

## 2025-07-15 PROBLEM — I63.9 CEREBROVASCULAR ACCIDENT (CVA), UNSPECIFIED MECHANISM: Status: ACTIVE | Noted: 2025-07-01

## 2025-07-15 PROCEDURE — G2211 COMPLEX E/M VISIT ADD ON: CPT

## 2025-07-15 PROCEDURE — 99214 OFFICE O/P EST MOD 30 MIN: CPT

## 2025-07-15 RX ORDER — BLOOD-GLUCOSE METER
KIT MISCELLANEOUS
Qty: 1 | Refills: 0 | Status: ACTIVE | COMMUNITY
Start: 2025-07-15 | End: 1900-01-01

## 2025-07-23 ENCOUNTER — APPOINTMENT (OUTPATIENT)
Dept: HEART AND VASCULAR | Facility: CLINIC | Age: 80
End: 2025-07-23
Payer: MEDICARE

## 2025-07-23 ENCOUNTER — NON-APPOINTMENT (OUTPATIENT)
Age: 80
End: 2025-07-23

## 2025-07-23 PROCEDURE — 93298 REM INTERROG DEV EVAL SCRMS: CPT

## 2025-08-08 ENCOUNTER — APPOINTMENT (OUTPATIENT)
Dept: NEUROLOGY | Facility: CLINIC | Age: 80
End: 2025-08-08

## 2025-08-25 ENCOUNTER — APPOINTMENT (OUTPATIENT)
Dept: HEART AND VASCULAR | Facility: CLINIC | Age: 80
End: 2025-08-25
Payer: MEDICARE

## 2025-08-25 ENCOUNTER — NON-APPOINTMENT (OUTPATIENT)
Age: 80
End: 2025-08-25

## 2025-08-25 PROCEDURE — 93298 REM INTERROG DEV EVAL SCRMS: CPT

## 2025-09-15 ENCOUNTER — APPOINTMENT (OUTPATIENT)
Facility: CLINIC | Age: 80
End: 2025-09-15
Payer: MEDICARE

## 2025-09-15 VITALS
OXYGEN SATURATION: 96 % | TEMPERATURE: 98.3 F | DIASTOLIC BLOOD PRESSURE: 81 MMHG | HEART RATE: 81 BPM | HEIGHT: 63 IN | SYSTOLIC BLOOD PRESSURE: 148 MMHG

## 2025-09-15 DIAGNOSIS — I63.9 CEREBRAL INFARCTION, UNSPECIFIED: ICD-10-CM

## 2025-09-15 PROCEDURE — 99214 OFFICE O/P EST MOD 30 MIN: CPT

## 2025-09-17 ENCOUNTER — APPOINTMENT (OUTPATIENT)
Dept: OPHTHALMOLOGY | Facility: CLINIC | Age: 80
End: 2025-09-17
Payer: MEDICARE

## 2025-09-17 ENCOUNTER — NON-APPOINTMENT (OUTPATIENT)
Age: 80
End: 2025-09-17

## 2025-09-17 PROCEDURE — 92083 EXTENDED VISUAL FIELD XM: CPT

## 2025-09-17 PROCEDURE — 92133 CPTRZD OPH DX IMG PST SGM ON: CPT

## 2025-09-17 PROCEDURE — 92014 COMPRE OPH EXAM EST PT 1/>: CPT

## (undated) DEVICE — LINE BREATHE SAMPLNG

## (undated) DEVICE — WARMING BLANKET FULL ADULT

## (undated) DEVICE — SALIVA EJECTOR (BLUE)

## (undated) DEVICE — SYR LUER LOK 3CC

## (undated) DEVICE — INJ SYS RAP REFIL

## (undated) DEVICE — CATH IV SAFE BC 22G X 1" (BLUE)

## (undated) DEVICE — VENODYNE/SCD SLEEVE CALF MEDIUM

## (undated) DEVICE — NDL HYPO SAFE 22G X 1" (BLACK)

## (undated) DEVICE — TUBING SUCTION NONCONDUCTIVE 6MM X 12FT

## (undated) DEVICE — GOWN LG

## (undated) DEVICE — PACK IV START WITH CHG

## (undated) DEVICE — OMNIPAQUE 300  30ML

## (undated) DEVICE — ELCTR ECG CONDUCTIVE ADHESIVE

## (undated) DEVICE — SOL INJ NS 0.9% 500ML 1-PORT

## (undated) DEVICE — BASIN EMESIS 10IN GRADUATED MAUVE

## (undated) DEVICE — LOK DVC RX AND BIOPSY

## (undated) DEVICE — SOL IRR POUR NS 0.9% 500ML

## (undated) DEVICE — DRSG 2X2

## (undated) DEVICE — CONTAINER FORMALIN 10% 20ML

## (undated) DEVICE — SENSOR O2 FINGER ADULT

## (undated) DEVICE — TUBING MEDI-VAC W MAXIGRIP CONNECTORS 1/4"X6'

## (undated) DEVICE — DENTURE CUP PINK

## (undated) DEVICE — DRSG BANDAID 0.75X3"

## (undated) DEVICE — TUBING IV SET GRAVITY 1Y 78" MACRO

## (undated) DEVICE — TUBING ENDO EXT OLYMPUS 160 24HR USE

## (undated) DEVICE — BITE BLOCK ADULT 20 X 27MM (GREEN)

## (undated) DEVICE — UNDERPAD LINEN SAVER 17 X 24"

## (undated) DEVICE — ANESTHESIA CIRCUIT ADULT HMEF

## (undated) DEVICE — DRSG CURITY GAUZE SPONGE 4 X 4" 12-PLY NON-STERILE

## (undated) DEVICE — SYR LUER LOK 10CC

## (undated) DEVICE — LABELS BLANK W PEN